# Patient Record
Sex: FEMALE | Race: WHITE | Employment: FULL TIME | URBAN - METROPOLITAN AREA
[De-identification: names, ages, dates, MRNs, and addresses within clinical notes are randomized per-mention and may not be internally consistent; named-entity substitution may affect disease eponyms.]

---

## 2019-12-11 ENCOUNTER — OFFICE VISIT (OUTPATIENT)
Dept: OBGYN | Facility: CLINIC | Age: 24
End: 2019-12-11
Payer: COMMERCIAL

## 2019-12-11 ENCOUNTER — TELEPHONE (OUTPATIENT)
Dept: OBGYN | Facility: CLINIC | Age: 24
End: 2019-12-11

## 2019-12-11 VITALS
BODY MASS INDEX: 37.95 KG/M2 | SYSTOLIC BLOOD PRESSURE: 135 MMHG | DIASTOLIC BLOOD PRESSURE: 87 MMHG | WEIGHT: 201 LBS | HEART RATE: 89 BPM | HEIGHT: 61 IN | TEMPERATURE: 98.4 F

## 2019-12-11 DIAGNOSIS — Z11.3 ROUTINE SCREENING FOR STI (SEXUALLY TRANSMITTED INFECTION): ICD-10-CM

## 2019-12-11 DIAGNOSIS — B96.89 BV (BACTERIAL VAGINOSIS): Primary | ICD-10-CM

## 2019-12-11 DIAGNOSIS — N76.0 BV (BACTERIAL VAGINOSIS): Primary | ICD-10-CM

## 2019-12-11 DIAGNOSIS — N92.6 IRREGULAR MENSES: Primary | ICD-10-CM

## 2019-12-11 DIAGNOSIS — N89.8 VAGINAL DISCHARGE: ICD-10-CM

## 2019-12-11 LAB
HBV SURFACE AG SERPL QL IA: NONREACTIVE
HCG UR QL: NEGATIVE
HCV AB SERPL QL IA: NONREACTIVE
HIV 1+2 AB+HIV1 P24 AG SERPL QL IA: NONREACTIVE
PROLACTIN SERPL-MCNC: 8 UG/L (ref 3–27)
SPECIMEN SOURCE: ABNORMAL
WET PREP SPEC: ABNORMAL

## 2019-12-11 PROCEDURE — 81025 URINE PREGNANCY TEST: CPT | Performed by: OBSTETRICS & GYNECOLOGY

## 2019-12-11 PROCEDURE — 84443 ASSAY THYROID STIM HORMONE: CPT | Performed by: OBSTETRICS & GYNECOLOGY

## 2019-12-11 PROCEDURE — 84270 ASSAY OF SEX HORMONE GLOBUL: CPT | Performed by: OBSTETRICS & GYNECOLOGY

## 2019-12-11 PROCEDURE — 99244 OFF/OP CNSLTJ NEW/EST MOD 40: CPT | Performed by: OBSTETRICS & GYNECOLOGY

## 2019-12-11 PROCEDURE — 87340 HEPATITIS B SURFACE AG IA: CPT | Performed by: OBSTETRICS & GYNECOLOGY

## 2019-12-11 PROCEDURE — 99000 SPECIMEN HANDLING OFFICE-LAB: CPT | Performed by: OBSTETRICS & GYNECOLOGY

## 2019-12-11 PROCEDURE — 87591 N.GONORRHOEAE DNA AMP PROB: CPT | Performed by: OBSTETRICS & GYNECOLOGY

## 2019-12-11 PROCEDURE — 87389 HIV-1 AG W/HIV-1&-2 AB AG IA: CPT | Performed by: OBSTETRICS & GYNECOLOGY

## 2019-12-11 PROCEDURE — 83520 IMMUNOASSAY QUANT NOS NONAB: CPT | Mod: 90 | Performed by: OBSTETRICS & GYNECOLOGY

## 2019-12-11 PROCEDURE — 84403 ASSAY OF TOTAL TESTOSTERONE: CPT | Performed by: OBSTETRICS & GYNECOLOGY

## 2019-12-11 PROCEDURE — 87491 CHLMYD TRACH DNA AMP PROBE: CPT | Performed by: OBSTETRICS & GYNECOLOGY

## 2019-12-11 PROCEDURE — 84146 ASSAY OF PROLACTIN: CPT | Performed by: OBSTETRICS & GYNECOLOGY

## 2019-12-11 PROCEDURE — 86780 TREPONEMA PALLIDUM: CPT | Performed by: OBSTETRICS & GYNECOLOGY

## 2019-12-11 PROCEDURE — 87210 SMEAR WET MOUNT SALINE/INK: CPT | Performed by: OBSTETRICS & GYNECOLOGY

## 2019-12-11 PROCEDURE — 36415 COLL VENOUS BLD VENIPUNCTURE: CPT | Performed by: OBSTETRICS & GYNECOLOGY

## 2019-12-11 PROCEDURE — 86803 HEPATITIS C AB TEST: CPT | Performed by: OBSTETRICS & GYNECOLOGY

## 2019-12-11 RX ORDER — METRONIDAZOLE 500 MG/1
500 TABLET ORAL 2 TIMES DAILY
Qty: 14 TABLET | Refills: 0 | Status: SHIPPED | OUTPATIENT
Start: 2019-12-11 | End: 2019-12-18

## 2019-12-11 RX ORDER — MEDROXYPROGESTERONE ACETATE 10 MG
10 TABLET ORAL DAILY
Qty: 10 TABLET | Refills: 3 | Status: SHIPPED | OUTPATIENT
Start: 2019-12-11 | End: 2019-12-21

## 2019-12-11 SDOH — HEALTH STABILITY: MENTAL HEALTH: HOW OFTEN DO YOU HAVE A DRINK CONTAINING ALCOHOL?: 2-4 TIMES A MONTH

## 2019-12-11 SDOH — HEALTH STABILITY: MENTAL HEALTH: HOW MANY STANDARD DRINKS CONTAINING ALCOHOL DO YOU HAVE ON A TYPICAL DAY?: 1 OR 2

## 2019-12-11 ASSESSMENT — MIFFLIN-ST. JEOR: SCORE: 1604.11

## 2019-12-11 NOTE — LETTER
December 13, 2019      Mally Evans  [address]  [place] [address]        Dear ,    We are writing to inform you of your test results.    Your test results fall within the expected range(s) or remain unchanged from previous results.  Please continue with current treatment plan.    Resulted Orders   HCG Qual, Urine ([id number])   Result Value Ref Range    HCG Qual Urine Negative NEG^Negative      Comment:      This test is for screening purposes.  Results should be interpreted along with   the clinical picture.  Confirmation testing is available if warranted by   ordering [id number], HCG Quantitative Pregnancy.     TSH with free T4 reflex   Result Value Ref Range    TSH 1.26 0.40 - 4.00 mU/L   Prolactin   Result Value Ref Range    Prolactin 8 3 - 27 ug/L      Comment:      Reference ranges apply to non-pregnant females only.   **Testosterone Free and Total FUTURE anytime   Result Value Ref Range    Testosterone Total 38 8 - 60 ng/dL      Comment:      This test was developed and its performance characteristics determined by the   [facility],  Special Chemistry Laboratory. It has   not been cleared or approved by the FDA. The laboratory is regulated under   CLIA as qualified to perform high-complexity testing. This test is used for   clinical purposes. It should not be regarded as investigational or for   research.      Sex Hormone Binding Globulin 38 30 - 135 nmol/L    Free Testosterone Calculated 0.62 0.08 - 0.74 ng/dL      Comment:      18-30 Years 0.08-0.74 ng/dL  31-40 Years 0.13-0.92 ng/dL  41-51 Years 0.11-0.58 ng/dL  Postmenopausal 0.06-0.38 ng/dL     Anti-Mullerian hormone   Result Value Ref Range    Anti-Mullerian Hormone 7.926 0.401 - 16.015 ng/mL      Comment:      (Note)  INTERPRETIVE INFORMATION: Anti-Mullerian Hormone  FEMALE:  6 months - 14 years: 0.256 - 6.345 ng/mL  15-17 years:         0.861 - 10.451 ng/mL  18-29 years:         0.401 - 16.015 ng/mL  30-39  years:         0.176 - 11.705 ng/mL  40-45 years:         6.282 ng/mL or less  46-50 years:         0.064 ng/mL or less  Post-menopausal:     0.003 ng/mL or less  MALE:  6-11 months:         56.677 - 495.299 ng/mL  1-6 years:           33.442 - 342.450 ng/mL  7-9 years:           20.245 - 189.781 ng/mL  10-12 years:         2.903 - 178.243 ng/mL  13 years and older:  2.079 - 30.656 ng/mL  Test developed and characteristics determined by [facility]. See Compliance Statement D: [employer]  Performed by [facility],  [address] [place] [id number] [phone]  [email or web address], Sundar Villafana MD, Lab. Director     NEISSERIA GONORRHOEA PCR   Result Value Ref Range    Specimen Descrip Vagina     N Gonorrhea PCR Negative NEG^Negative      Comment:      Negative for N. gonorrhoeae rRNA by transcription mediated amplification.  A negative result by transcription mediated amplification does not preclude   the presence of N. gonorrhoeae infection because results are dependent on   proper and adequate collection, absence of inhibitors, and sufficient rRNA to   be detected.     CHLAMYDIA TRACHOMATIS PCR   Result Value Ref Range    Specimen Description Vagina     Chlamydia Trachomatis PCR Negative NEG^Negative      Comment:      Negative for C. trachomatis rRNA by transcription mediated amplification.  A negative result by transcription mediated amplification does not preclude   the presence of C. trachomatis infection because results are dependent on   proper and adequate collection, absence of inhibitors, and sufficient rRNA to   be detected.     HIV Antigen Antibody Combo   Result Value Ref Range    HIV Antigen Antibody Combo Nonreactive NR^Nonreactive          Comment:      HIV-1 p24 Ag & HIV-1/HIV-2 Ab Not Detected   Hepatitis C antibody   Result Value Ref Range    Hepatitis C Antibody Nonreactive NR^Nonreactive      Comment:      Assay performance characteristics have not been established for newborns,    infants, and children     Treponema Abs w Reflex to RPR and Titer   Result Value Ref Range    Treponema Antibodies Nonreactive NR^Nonreactive   Hepatitis B surface antigen   Result Value Ref Range    Hep B Surface Agn Nonreactive NR^Nonreactive   Wet prep   Result Value Ref Range    Specimen Description Vagina     Wet Prep Clue cells seen  Few   (A)     Wet Prep No yeast seen     Wet Prep No WBC's seen     Wet Prep No Trichomonas seen        If you have any questions or concerns, please call the clinic at the number listed above.       Sincerely,        Rachna Teran MD

## 2019-12-11 NOTE — PROGRESS NOTES
GYN Clinic Consultation    Date of visit: 2019     Chief Complaint: period late    HPI:   Mally Evans is a 24 year old  female who I was asked to see in consultation as a referral by Slime Paiz for evaluation of oligomenorrhea.     Usually period is very regular, every 28 days.  Was on OCPs, stopped in .  Since stopping the pill, periods still regular. Patient's last menstrual period was 10/31/2019. Sexually active, no contraception. Cramping for past 2 weeks. Feels like she should be having her period but no bleeding. Last unprotected sex was 11/15.     Has increased her exercise about 1 year ago. Works out 3 times per week with a  for 1 hour at a time. Lost about 25lb then gained 15lb back. Body composition has changed. Started tracking her food in October, eating a lot different. Tracks calories and protein. Tries to limit calories to 1900 per day.     Sister has PCOS and mother has hypothyroidism.   Sister's periods are irregular.   Patient has hirsutism - removes dark hair from upper lip. Acne on back.     Obstetric History:   OB History    Para Term  AB Living   2 0 0 0 2 0   SAB TAB Ectopic Multiple Live Births   2 0 0 0 0      # Outcome Date GA Lbr Kishan/2nd Weight Sex Delivery Anes PTL Lv   2 SAB 2019 5w0d    SAB      1 SAB 2017 5w0d    SAB          Gynecologic History:  Menarche: 13  Menses: occur every 28 days lasting 3 - 5 days in duration.   Patient's last menstrual period was 10/31/2019.  STI history: none  Last Pap: 18 NIL   History of abnormal pap: no  History of cervical procedures: no   Contraceptive History: none  The patient is sexually active with male partners.     Past Medical History:  History reviewed. No pertinent past medical history.    Past Surgical History:  Past Surgical History:   Procedure Laterality Date     ENDOSCOPIC RETROGRADE CHOLANGIOPANCREATOGRAPHY  2017     LAPAROSCOPIC CHOLECYSTECTOMY  2017     wisdom teeth  "removal  03/2018         Medications:  No current outpatient medications on file.     No current facility-administered medications for this visit.        Allergy:  No Known Allergies  Patient denies food, latex or environmental allergies.     Social History:  Works as an  at a dental office.   Lives alone.  Social History     Tobacco Use     Smoking status: Light Tobacco Smoker     Smokeless tobacco: Current User     Tobacco comment: pt states she smokes occ.   Substance Use Topics     Alcohol use: Yes     Frequency: 2-4 times a month     Drinks per session: 1 or 2     Drug use: Yes     Types: Marijuana         Family History   Problem Relation Age of Onset     Anxiety Disorder Mother      Depression Mother      Thyroid Disease Mother      Depression Father      Anxiety Disorder Father      Breast Cancer Maternal Grandmother      Diabetes Paternal Grandfather      Dementia Paternal Grandfather      Polycystic ovary syndrome Sister        Review of Systems:  Irregular menses, hirsutism and acne   ROS: 10 point ROS neg other than the symptoms noted above      Physical Exam:  Vitals:    12/11/19 1107   BP: 135/87   Pulse: 89   Temp: 98.4  F (36.9  C)   TempSrc: Oral   Weight: 91.2 kg (201 lb)   Height: 1.549 m (5' 1\")     Body mass index is 37.98 kg/m .    Gen: healthy, alert, active, no distress  Neck: supple, no adenopathy, normal thyroid  CV: normal pulses, no edema  Resp: normal respiratory effort  Abd: soft, non-tender, non-distended, no masses, no hernias, well healed scars  Extremities: nontender, warm and well perfused  :   - external genitalia: vulva and perineum are normal without lesion, mass or erythema  - urethra: well supported urethra, no hypermobility, normal Skenes and Bartholins.   - bladder: no tenderness, no masses  - vagina: intact, rugated mucosa without lesions or abnormal discharge. noprolapse.   - cervix: normal, no lesions or abnormal discharge. Wet prep, GC/CT cultures are " obtained.   - uterus: 6wk size, no masses or tenderness  - adnexa: no masses or tenderness  - rectal: deferred      Assessment:  Mally Evans is a 24 year old  who presents to discuss irregular menses. Oligomenorrhea, hirsutism, acne, obesity and family history (sister) with PCOS - discussed she likely has it as well. Discussed that if she is sexually active and not using contraception even though her periods are irregular she can still get pregnant. She is not interested in contraception at this time. Discussed provera to induce withdrawal bleed - she would like to do this. Discussed polycystic ovarian syndrome with Mally Evans. Explained that polycystic ovary syndrome (PCOS) is an important cause of both menstrual irregularity and androgen excess in women. Discussed that it can cause irregular menstrual cycles, hirsutism, obesity, insulin resistance, and anovulatory infertility. Specifically explained that due to PCOS she is at increased risk of type 2 diabetes, cardiovascular disease, endometrial hyperplasia, endometrial cancer and infertility.   Counseled Mally Evans that diet and exercise for weight reduction as the first step for overweight and obese women with PCOS. Weight loss can restore ovulatory cycles and improve metabolic risk. Plan for weight loss: working with  and tracking food.  Discussed that oral contraceptives (OCs) are the mainstay of pharmacologic therapy for women with PCOS for managing hyperandrogenism, treating menstrual dysfunction and protecting endometrial lining. Additionally it provides contraception.   For women with PCOS who choose not to or cannot take OCs, we discussed alternative treatments for endometrial protection are intermittent or continuous progestin therapy, or a progestin-releasing intrauterine device (IUD)   Additionally discussed metformin as an alternative that can restore ovulatory menses in approximately 50 percent of women with PCOS.   Discussed that if  she is unable to conceive on her own, she benefit from medications to induce ovulation.    Plan:  1. Irregular menses  - HCG Qual, Urine ([id number])  - TSH with free T4 reflex  - Prolactin  - **Testosterone Free and Total FUTURE anytime  - Anti-Mullerian hormone  - medroxyPROGESTERone (PROVERA) 10 MG tablet; Take 1 tablet (10 mg) by mouth daily for 10 days  Dispense: 10 tablet; Refill: 3    2. Routine screening for STI (sexually transmitted infection)  - NEISSERIA GONORRHOEA PCR  - CHLAMYDIA TRACHOMATIS PCR  - HIV Antigen Antibody Combo  - Hepatitis C antibody  - Treponema Abs w Reflex to RPR and Titer  - Hepatitis B surface antigen  - Wet prep    3. Vaginal discharge  - Wet prep      Follow up: CHEIKH Teran MD

## 2019-12-11 NOTE — LETTER
December 12, 2019      Mally Evans  [address]  [place] [address]        Dear ,    We are writing to inform you of your test results.    Your test results fall within the expected range(s) or remain unchanged from previous results.  Please continue with current treatment plan.    Resulted Orders   HCG Qual, Urine ([id number])   Result Value Ref Range    HCG Qual Urine Negative NEG^Negative      Comment:      This test is for screening purposes.  Results should be interpreted along with   the clinical picture.  Confirmation testing is available if warranted by   ordering [id number], HCG Quantitative Pregnancy.     TSH with free T4 reflex   Result Value Ref Range    TSH 1.26 0.40 - 4.00 mU/L   Prolactin   Result Value Ref Range    Prolactin 8 3 - 27 ug/L      Comment:      Reference ranges apply to non-pregnant females only.   HIV Antigen Antibody Combo   Result Value Ref Range    HIV Antigen Antibody Combo Nonreactive NR^Nonreactive          Comment:      HIV-1 p24 Ag & HIV-1/HIV-2 Ab Not Detected   Hepatitis C antibody   Result Value Ref Range    Hepatitis C Antibody Nonreactive NR^Nonreactive      Comment:      Assay performance characteristics have not been established for newborns,   infants, and children     Hepatitis B surface antigen   Result Value Ref Range    Hep B Surface Agn Nonreactive NR^Nonreactive   Wet prep   Result Value Ref Range    Specimen Description Vagina     Wet Prep Clue cells seen  Few   (A)     Wet Prep No yeast seen     Wet Prep No WBC's seen     Wet Prep No Trichomonas seen        If you have any questions or concerns, please call the clinic at the number listed above.       Sincerely,        Rachna Teran MD

## 2019-12-11 NOTE — LETTER
December 12, 2019      Mally Evans  [address]  [place] [address]        Dear ,    We are writing to inform you of your test results.    Your test results fall within the expected range(s) or remain unchanged from previous results.  Please continue with current treatment plan.    Resulted Orders   HCG Qual, Urine ([id number])   Result Value Ref Range    HCG Qual Urine Negative NEG^Negative      Comment:      This test is for screening purposes.  Results should be interpreted along with   the clinical picture.  Confirmation testing is available if warranted by   ordering [id number], HCG Quantitative Pregnancy.     TSH with free T4 reflex   Result Value Ref Range    TSH 1.26 0.40 - 4.00 mU/L   Prolactin   Result Value Ref Range    Prolactin 8 3 - 27 ug/L      Comment:      Reference ranges apply to non-pregnant females only.   NEISSERIA GONORRHOEA PCR   Result Value Ref Range    Specimen Descrip Vagina     N Gonorrhea PCR Negative NEG^Negative      Comment:      Negative for N. gonorrhoeae rRNA by transcription mediated amplification.  A negative result by transcription mediated amplification does not preclude   the presence of N. gonorrhoeae infection because results are dependent on   proper and adequate collection, absence of inhibitors, and sufficient rRNA to   be detected.     CHLAMYDIA TRACHOMATIS PCR   Result Value Ref Range    Specimen Description Vagina     Chlamydia Trachomatis PCR Negative NEG^Negative      Comment:      Negative for C. trachomatis rRNA by transcription mediated amplification.  A negative result by transcription mediated amplification does not preclude   the presence of C. trachomatis infection because results are dependent on   proper and adequate collection, absence of inhibitors, and sufficient rRNA to   be detected.     HIV Antigen Antibody Combo   Result Value Ref Range    HIV Antigen Antibody Combo Nonreactive NR^Nonreactive          Comment:      HIV-1 p24 Ag &  HIV-1/HIV-2 Ab Not Detected   Hepatitis C antibody   Result Value Ref Range    Hepatitis C Antibody Nonreactive NR^Nonreactive      Comment:      Assay performance characteristics have not been established for newborns,   infants, and children     Treponema Abs w Reflex to RPR and Titer   Result Value Ref Range    Treponema Antibodies Nonreactive NR^Nonreactive   Hepatitis B surface antigen   Result Value Ref Range    Hep B Surface Agn Nonreactive NR^Nonreactive   Wet prep   Result Value Ref Range    Specimen Description Vagina     Wet Prep Clue cells seen  Few   (A)     Wet Prep No yeast seen     Wet Prep No WBC's seen     Wet Prep No Trichomonas seen        If you have any questions or concerns, please call the clinic at the number listed above.       Sincerely,        Rachna Teran MD

## 2019-12-12 LAB
C TRACH DNA SPEC QL NAA+PROBE: NEGATIVE
MIS SERPL-MCNC: 7.93 NG/ML (ref 0.4–16.02)
N GONORRHOEA DNA SPEC QL NAA+PROBE: NEGATIVE
SPECIMEN SOURCE: NORMAL
SPECIMEN SOURCE: NORMAL
T PALLIDUM AB SER QL: NONREACTIVE
TSH SERPL DL<=0.005 MIU/L-ACNC: 1.26 MU/L (ref 0.4–4)

## 2019-12-13 LAB
SHBG SERPL-SCNC: 38 NMOL/L (ref 30–135)
TESTOST FREE SERPL-MCNC: 0.62 NG/DL (ref 0.08–0.74)
TESTOST SERPL-MCNC: 38 NG/DL (ref 8–60)

## 2020-03-11 ENCOUNTER — HEALTH MAINTENANCE LETTER (OUTPATIENT)
Age: 25
End: 2020-03-11

## 2021-01-04 ENCOUNTER — HEALTH MAINTENANCE LETTER (OUTPATIENT)
Age: 26
End: 2021-01-04

## 2021-03-18 ENCOUNTER — IMMUNIZATION (OUTPATIENT)
Dept: NURSING | Facility: CLINIC | Age: 26
End: 2021-03-18
Payer: COMMERCIAL

## 2021-03-18 PROCEDURE — 0001A PR COVID VAC PFIZER DIL RECON 30 MCG/0.3 ML IM: CPT

## 2021-03-18 PROCEDURE — 91300 PR COVID VAC PFIZER DIL RECON 30 MCG/0.3 ML IM: CPT

## 2021-04-08 ENCOUNTER — IMMUNIZATION (OUTPATIENT)
Dept: NURSING | Facility: CLINIC | Age: 26
End: 2021-04-08
Attending: FAMILY MEDICINE
Payer: COMMERCIAL

## 2021-04-08 PROCEDURE — 0002A PR COVID VAC PFIZER DIL RECON 30 MCG/0.3 ML IM: CPT

## 2021-04-08 PROCEDURE — 91300 PR COVID VAC PFIZER DIL RECON 30 MCG/0.3 ML IM: CPT

## 2021-04-25 ENCOUNTER — HEALTH MAINTENANCE LETTER (OUTPATIENT)
Age: 26
End: 2021-04-25

## 2021-10-10 ENCOUNTER — HEALTH MAINTENANCE LETTER (OUTPATIENT)
Age: 26
End: 2021-10-10

## 2021-12-29 ENCOUNTER — MEDICAL CORRESPONDENCE (OUTPATIENT)
Dept: HEALTH INFORMATION MANAGEMENT | Facility: CLINIC | Age: 26
End: 2021-12-29

## 2021-12-29 ENCOUNTER — OFFICE VISIT (OUTPATIENT)
Dept: FAMILY MEDICINE | Facility: CLINIC | Age: 26
End: 2021-12-29
Payer: COMMERCIAL

## 2021-12-29 VITALS
BODY MASS INDEX: 39.46 KG/M2 | SYSTOLIC BLOOD PRESSURE: 110 MMHG | HEIGHT: 61 IN | DIASTOLIC BLOOD PRESSURE: 77 MMHG | HEART RATE: 80 BPM | TEMPERATURE: 98.5 F | WEIGHT: 209 LBS

## 2021-12-29 DIAGNOSIS — N92.6 MISSED MENSES: ICD-10-CM

## 2021-12-29 DIAGNOSIS — Z91.410 HISTORY OF DOMESTIC PHYSICAL ABUSE IN ADULT: ICD-10-CM

## 2021-12-29 DIAGNOSIS — F33.2 SEVERE EPISODE OF RECURRENT MAJOR DEPRESSIVE DISORDER, WITHOUT PSYCHOTIC FEATURES (H): Primary | ICD-10-CM

## 2021-12-29 DIAGNOSIS — F43.10 PTSD (POST-TRAUMATIC STRESS DISORDER): ICD-10-CM

## 2021-12-29 DIAGNOSIS — Z11.3 SCREEN FOR STD (SEXUALLY TRANSMITTED DISEASE): ICD-10-CM

## 2021-12-29 LAB
HCG UR QL: NEGATIVE
HIV 1+2 AB+HIV1 P24 AG SERPL QL IA: NEGATIVE

## 2021-12-29 PROCEDURE — 87340 HEPATITIS B SURFACE AG IA: CPT | Performed by: FAMILY MEDICINE

## 2021-12-29 PROCEDURE — 86803 HEPATITIS C AB TEST: CPT | Performed by: FAMILY MEDICINE

## 2021-12-29 PROCEDURE — 86780 TREPONEMA PALLIDUM: CPT | Performed by: FAMILY MEDICINE

## 2021-12-29 PROCEDURE — 87389 HIV-1 AG W/HIV-1&-2 AB AG IA: CPT | Performed by: FAMILY MEDICINE

## 2021-12-29 PROCEDURE — 81025 URINE PREGNANCY TEST: CPT | Performed by: FAMILY MEDICINE

## 2021-12-29 PROCEDURE — 99204 OFFICE O/P NEW MOD 45 MIN: CPT | Performed by: FAMILY MEDICINE

## 2021-12-29 PROCEDURE — 36415 COLL VENOUS BLD VENIPUNCTURE: CPT | Performed by: FAMILY MEDICINE

## 2021-12-29 RX ORDER — SERTRALINE HYDROCHLORIDE 25 MG/1
25 TABLET, FILM COATED ORAL DAILY
Qty: 30 TABLET | Refills: 1 | Status: SHIPPED | OUTPATIENT
Start: 2021-12-29 | End: 2023-01-02

## 2021-12-29 RX ORDER — HYDROXYZINE HYDROCHLORIDE 25 MG/1
25-50 TABLET, FILM COATED ORAL
Qty: 30 TABLET | Refills: 1 | Status: SHIPPED | OUTPATIENT
Start: 2021-12-29 | End: 2023-01-02

## 2021-12-29 ASSESSMENT — ANXIETY QUESTIONNAIRES
5. BEING SO RESTLESS THAT IT IS HARD TO SIT STILL: NEARLY EVERY DAY
GAD7 TOTAL SCORE: 18
3. WORRYING TOO MUCH ABOUT DIFFERENT THINGS: NEARLY EVERY DAY
1. FEELING NERVOUS, ANXIOUS, OR ON EDGE: MORE THAN HALF THE DAYS
4. TROUBLE RELAXING: NEARLY EVERY DAY
7. FEELING AFRAID AS IF SOMETHING AWFUL MIGHT HAPPEN: NEARLY EVERY DAY
IF YOU CHECKED OFF ANY PROBLEMS ON THIS QUESTIONNAIRE, HOW DIFFICULT HAVE THESE PROBLEMS MADE IT FOR YOU TO DO YOUR WORK, TAKE CARE OF THINGS AT HOME, OR GET ALONG WITH OTHER PEOPLE: VERY DIFFICULT
6. BECOMING EASILY ANNOYED OR IRRITABLE: MORE THAN HALF THE DAYS
2. NOT BEING ABLE TO STOP OR CONTROL WORRYING: MORE THAN HALF THE DAYS

## 2021-12-29 ASSESSMENT — MIFFLIN-ST. JEOR: SCORE: 1630.4

## 2021-12-29 ASSESSMENT — PATIENT HEALTH QUESTIONNAIRE - PHQ9: SUM OF ALL RESPONSES TO PHQ QUESTIONS 1-9: 20

## 2021-12-29 NOTE — PATIENT INSTRUCTIONS
Mental Health referral recommendations:    [facility]:   [facility] [phone]  [address], [place] [address]     [place]:  [place] [facility]: [phone]  [address], [facility] [address]    [facility]: [phone]  [address], [place] [address]    [facility]:  [phone]   [address] [place] [address]    [facility]:  [facility] [phone]   [address], [address]    [place]:  [facility]: [phone]  [address]     [place]:  [place]  [phone]  [address], [place] [address]    [place]:  [place] [facility] [phone]    [address], [facility]  [address]    [facility] /[phone]   [address], [address]    [facility] [phone] [address], [address]    [facility] [phone]   [facility]    Below are resources in case you experience an increase in symptoms or feel you are in crisis:     Acute Emergency / Crisis    If you are having an acute psychiatric emergency, please call 911 or have someone drive you directly to a hospital for further evaluation.    Mental Health Crisis Lines    [facility]:     Adult Crisis Line ([phone])    Children's Crisis Line ([phone])  [facility]:  Crisis Connection  ([phone])      Urgent Care     [address] [place] [address]   ([phone])     Provides mental health crisis assessments    Walk-in appointments are available     Additional resources  -Local 24 hour Crisis Line: 1-734.582.7086   -National Suicide Prevention Life Line 0-572-871-TALK (3479), www.suicidepreventionlifeline.org  - National [place] of Mental Illness (www.[place].richard.org) 616.655.4721 or 1-224.489.4771  - Suicide Awareness Voices of Education (SAVE) (www.save.org) 4-796-638-SAVE (4719)   - Substance Abuse and  Mental Health Services Administration ([facility]) www.samhsa.gov 24 hour helpline: 1-141-708 HELP (6441)  - Narcotics Anonymous (www.[place].org) 24 hours [facility] Helpline 1-363.756.3466  - [facility] Alcoholic Anonymous [facility] 24 hour phone line [phone]  - Alcoholics Anonymous (www.alcoholics-anonymous.org)  - [place] Recovery Connection ([facility]). [facility] connects people seeking recovery to resources that help foster and sustain long-term recovery. Whether you are seeking resources for treatment, transpiortation, housing, job training, education, health or other pathways to recovery, [facility] is a great place to start. [phone] www.[place] recovery.org    Health Tips:  - Create a daily schedule  - Eat Healthy  - Do at least one enjoyable activity each day  - Take medications as prescribed  - Get regular sleep at least 8 hours per night  - Practice relaxation  - Spend time with supportive people
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
## 2021-12-30 PROBLEM — K80.70 CHOLELITHIASIS WITH CHOLEDOCHOLITHIASIS: Status: ACTIVE | Noted: 2017-03-13

## 2021-12-30 LAB
HBV SURFACE AG SERPL QL IA: NONREACTIVE
HCV AB SERPL QL IA: NEGATIVE
T PALLIDUM AB SER QL: NONREACTIVE

## 2021-12-30 ASSESSMENT — ANXIETY QUESTIONNAIRES: GAD7 TOTAL SCORE: 18

## 2022-04-05 ENCOUNTER — OFFICE VISIT (OUTPATIENT)
Dept: OBGYN | Facility: CLINIC | Age: 27
End: 2022-04-05
Payer: COMMERCIAL

## 2022-04-05 VITALS — WEIGHT: 194.5 LBS | HEIGHT: 62 IN | BODY MASS INDEX: 35.79 KG/M2

## 2022-04-05 DIAGNOSIS — Z01.411 ENCOUNTER FOR GYNECOLOGICAL EXAMINATION WITH ABNORMAL FINDING: Primary | ICD-10-CM

## 2022-04-05 DIAGNOSIS — E66.01 MORBID OBESITY (H): ICD-10-CM

## 2022-04-05 LAB
CLUE CELLS: ABNORMAL
PROLACTIN SERPL-MCNC: 7 UG/L (ref 3–27)
TRICHOMONAS, WET PREP: ABNORMAL
WBC'S/HIGH POWER FIELD, WET PREP: ABNORMAL
YEAST, WET PREP: ABNORMAL

## 2022-04-05 PROCEDURE — 85730 THROMBOPLASTIN TIME PARTIAL: CPT | Performed by: OBSTETRICS & GYNECOLOGY

## 2022-04-05 PROCEDURE — 87210 SMEAR WET MOUNT SALINE/INK: CPT | Performed by: OBSTETRICS & GYNECOLOGY

## 2022-04-05 PROCEDURE — 87340 HEPATITIS B SURFACE AG IA: CPT | Performed by: OBSTETRICS & GYNECOLOGY

## 2022-04-05 PROCEDURE — 86803 HEPATITIS C AB TEST: CPT | Performed by: OBSTETRICS & GYNECOLOGY

## 2022-04-05 PROCEDURE — 36415 COLL VENOUS BLD VENIPUNCTURE: CPT | Performed by: OBSTETRICS & GYNECOLOGY

## 2022-04-05 PROCEDURE — 84146 ASSAY OF PROLACTIN: CPT | Performed by: OBSTETRICS & GYNECOLOGY

## 2022-04-05 PROCEDURE — 86780 TREPONEMA PALLIDUM: CPT | Performed by: OBSTETRICS & GYNECOLOGY

## 2022-04-05 PROCEDURE — 85390 FIBRINOLYSINS SCREEN I&R: CPT | Performed by: PATHOLOGY

## 2022-04-05 PROCEDURE — 84443 ASSAY THYROID STIM HORMONE: CPT | Performed by: OBSTETRICS & GYNECOLOGY

## 2022-04-05 PROCEDURE — 87491 CHLMYD TRACH DNA AMP PROBE: CPT | Performed by: OBSTETRICS & GYNECOLOGY

## 2022-04-05 PROCEDURE — 87591 N.GONORRHOEAE DNA AMP PROB: CPT | Performed by: OBSTETRICS & GYNECOLOGY

## 2022-04-05 PROCEDURE — 99395 PREV VISIT EST AGE 18-39: CPT | Performed by: OBSTETRICS & GYNECOLOGY

## 2022-04-05 PROCEDURE — 85613 RUSSELL VIPER VENOM DILUTED: CPT | Performed by: OBSTETRICS & GYNECOLOGY

## 2022-04-05 PROCEDURE — 99213 OFFICE O/P EST LOW 20 MIN: CPT | Mod: 25 | Performed by: OBSTETRICS & GYNECOLOGY

## 2022-04-05 PROCEDURE — G0145 SCR C/V CYTO,THINLAYER,RESCR: HCPCS | Performed by: OBSTETRICS & GYNECOLOGY

## 2022-04-05 PROCEDURE — 87389 HIV-1 AG W/HIV-1&-2 AB AG IA: CPT | Performed by: OBSTETRICS & GYNECOLOGY

## 2022-04-05 NOTE — PROGRESS NOTES
SUBJECTIVE:                                                      Mally is a 26 year old  female who presents for annual exam.     Her last gyn exam was with Dr. Teran in 2019.  PCOS was discussed and labs were drawn.  No hyperandrogenism was present.  Her last Pap was in 2018 at [facility] and was Negative.      No LMP recorded.. Menses are irregular and variable lasting.  Cycle lengths vary between 23 and 32 days.  Using condoms for contraception.  She is not currently considering pregnancy but will in the near future.  Thinks she has more than the 2 documented 1st trimester sabs.  Besides routine health maintenance,  she would like to discuss vaginal itching, painful I/C, irregular cycles and recurrent miscarriages.  GYNECOLOGIC HISTORY:    Mally is sexually active with 1 male partner and is currently in a monogamous relationship.      History sexually transmitted infections:No STD history    History of abnormal Pap smear: NO - age 21-29 PAP every 3 years recommended.  Last 2018 at [facility]  Family history of breast CA: Yes - MGM  Family history of uterine/ovarian CA: No    Family history of colon CA: No      HISTORY:  OB History    Para Term  AB Living   2 0 0 0 2 0   SAB IAB Ectopic Multiple Live Births   2 0 0 0 0      # Outcome Date GA Lbr Kishan/2nd Weight Sex Delivery Anes PTL Lv   2 SAB 2019 5w0d    SAB      1 SAB 2017 5w0d    SAB        Past Medical History:   Diagnosis Date     Known health problems: none 2022     Past Surgical History:   Procedure Laterality Date     ENDOSCOPIC RETROGRADE CHOLANGIOPANCREATOGRAPHY       LAPAROSCOPIC CHOLECYSTECTOMY  2017     wisdom teeth removal  2018     Family History   Problem Relation Age of Onset     Anxiety Disorder Mother      Depression Mother      Thyroid Disease Mother      Depression Father      Anxiety Disorder Father      Polycystic ovary syndrome Sister      Depression Sister      Depression Brother       Breast Cancer Maternal Grandmother      Diabetes Paternal Grandfather      Dementia Paternal Grandfather      Social History     Socioeconomic History     Marital status: Single     Spouse name: None     Number of children: None     Years of education: None     Highest education level: None   Occupational History     None   Tobacco Use     Smoking status: Former Smoker     Quit date: 10/2020     Years since quittin.5     Smokeless tobacco: Current User     Tobacco comment: vape   Substance and Sexual Activity     Alcohol use: Yes     Comment: seldom     Drug use: Yes     Types: Marijuana     Sexual activity: Yes     Partners: Male     Birth control/protection: Condom   Other Topics Concern     Parent/sibling w/ CABG, MI or angioplasty before 65F 55M? Not Asked   Social History Narrative     None     Social Determinants of Health     Financial Resource Strain: Not on file   Food Insecurity: Not on file   Transportation Needs: Not on file   Physical Activity: Not on file   Stress: Not on file   Social Connections: Not on file   Intimate Partner Violence: Not on file   Housing Stability: Not on file       Current Outpatient Medications:      hydrOXYzine (ATARAX) 25 MG tablet, Take 1-2 tablets (25-50 mg) by mouth nightly as needed (sleep), Disp: 30 tablet, Rfl: 1     sertraline (ZOLOFT) 25 MG tablet, Take 1 tablet (25 mg) by mouth daily, Disp: 30 tablet, Rfl: 1     Allergies   Allergen Reactions     Latex Rash     Adhesive Tape Rash     rash       Past medical, surgical, social and family history were reviewed and updated in EPIC.    ROS:   12 point review of systems negative other than symptoms noted below.      OBJECTIVE:                                                      EXAM:  There were no vitals taken for this visit.   BMI: There is no height or weight on file to calculate BMI.  General: Alert and oriented, no distress.  Psychiatric: Mood and affect within normal limits.  Skin: Warm and dry, no lesions,  rashes or discolorations.  Neck: Neck supple. Thyroid palpbably normal in size and without nodularity.  Cardiovascular: Regular rate and rhythm, no murmurs, rubs or gallops.   Lungs:  Clear to auscultation bilaterally, breathing is unlabored.  Breasts:  Symmetric, no skin changes.  No dominant masses bilaterally.   Lymph:  No cervical, supraclavicular, infraclavicular, axillary or inguinal lymphadenopathy palpable.   Abdomen: Soft, nontender, no hepatosplenomegaly, no rebound or guarding, no masses, no hernias.   Vulva:  No external lesions, normal female hair distribution, no inguinal adenopathy.    Urethra:  Midline, non-tender, well supported, no discharge  Vagina:  Well-estrogenized, thick white clumpy discharge, no lesions  Cervix: no lesions, no discharge, nulliparous and Pap and GC/Chlamydia PCR obtained  Uterus:  anteverted, smooth contour, without enlargement, mobile, and without tenderness and tender over fundus  Ovaries:  No masses appreciated, non-tender, mobile  Rectal Exam: deferred  Musculoskeletal: extremities normal      COUNSELING:   Reviewed preventive health counseling, as reflected in patient instructions       Contraception       Safe sex practices/STD prevention       Discussed diagnosis and w/u for recurrent sabs   reports that she quit smoking about 18 months ago. She uses smokeless tobacco.        ASSESSMENT/PLAN:                                                      26 year old female with satisfactory annual exam  (Z01.419) Encounter for routine gynecological examination  (primary encounter diagnosis)  Comment: Normal exam save for increased discharge  Plan: Pap screen reflex to HPV if ASCUS - recommend         age 25 - 29, Wet prep - Clinic Collect,         NEISSERIA GONORRHOEA PCR, CHLAMYDIA TRACHOMATIS        PCR, TSH with free T4 reflex, Prolactin, HIV         Antigen Antibody Combo [[id number]], Hepatitis B         surface antigen [[id number]], Hepatitis C antibody         [[id number]],  Treponema Abs w Reflex to RPR and         Titer [[id number]], Lupus Anticoagulant Panel, US         Pelvic Transabdominal and Transvaginal          Loosely meets criteria for recurrent abortions.  2 documented 1st trimester sabs but thinks she's had more.  Will check lupus anticoagulant and schedule and U/S as initial w/u    STD screening ordered as well      Jesus Campbell MD

## 2022-04-06 ENCOUNTER — ALLIED HEALTH/NURSE VISIT (OUTPATIENT)
Dept: NURSING | Facility: CLINIC | Age: 27
End: 2022-04-06
Payer: COMMERCIAL

## 2022-04-06 ENCOUNTER — TELEPHONE (OUTPATIENT)
Dept: OBGYN | Facility: CLINIC | Age: 27
End: 2022-04-06

## 2022-04-06 ENCOUNTER — ANCILLARY PROCEDURE (OUTPATIENT)
Dept: ULTRASOUND IMAGING | Facility: CLINIC | Age: 27
End: 2022-04-06
Attending: OBSTETRICS & GYNECOLOGY
Payer: COMMERCIAL

## 2022-04-06 DIAGNOSIS — A54.9 GONORRHEA: Primary | ICD-10-CM

## 2022-04-06 DIAGNOSIS — Z01.411 ENCOUNTER FOR GYNECOLOGICAL EXAMINATION WITH ABNORMAL FINDING: ICD-10-CM

## 2022-04-06 LAB
C TRACH DNA SPEC QL NAA+PROBE: NEGATIVE
DRVVT SCREEN RATIO: 1.02
HBV SURFACE AG SERPL QL IA: NONREACTIVE
HCV AB SERPL QL IA: NONREACTIVE
HIV 1+2 AB+HIV1 P24 AG SERPL QL IA: NONREACTIVE
INR PPP: 1.05 (ref 0.85–1.15)
LA PPP-IMP: NEGATIVE
LUPUS INTERPRETATION: NORMAL
N GONORRHOEA DNA SPEC QL NAA+PROBE: POSITIVE
PTT RATIO: 1.05
T PALLIDUM AB SER QL: NONREACTIVE
THROMBIN TIME: 16 SECONDS (ref 13–19)
TSH SERPL DL<=0.005 MIU/L-ACNC: 0.54 MU/L (ref 0.4–4)

## 2022-04-06 PROCEDURE — 96372 THER/PROPH/DIAG INJ SC/IM: CPT | Performed by: OBSTETRICS & GYNECOLOGY

## 2022-04-06 PROCEDURE — 99207 PR NO CHARGE NURSE ONLY: CPT

## 2022-04-06 PROCEDURE — 76830 TRANSVAGINAL US NON-OB: CPT | Performed by: OBSTETRICS & GYNECOLOGY

## 2022-04-06 PROCEDURE — 76856 US EXAM PELVIC COMPLETE: CPT | Performed by: OBSTETRICS & GYNECOLOGY

## 2022-04-06 NOTE — TELEPHONE ENCOUNTER
Dr. Campbell called Mally re: her test results.  Will CI this afternoon for Rocephin injection for pos Gonorrhea result.   [facility] form started and on my desk to be faxed following injection.   Angi Torrez CMA

## 2022-04-06 NOTE — NURSING NOTE
Clinic Administered Medication Documentation    Administrations This Visit     cefTRIAXone (ROCEPHIN) injection 500 mg     Admin Date  04/06/2022 Action  Given Dose  500 mg Route  Intramuscular Site   Administered By  Angi Torrez CMA    Ordering Provider: Gavino Campbell MD    Patient Supplied?: No    Comments: on schedule                  Injectable Medication Documentation    Patient was given Ceftriaxone Sodium (Rocephin). Prior to medication administration, verified patients identity using patient s name and date of birth. Please see MAR and medication order for additional information. Patient instructed to remain in clinic for 15 minutes.      Was entire vial of medication used? Yes  Vial/Syringe: Single dose vial  Expiration Date:  02/2024  Was this medication supplied by the patient? No   Angi Torrez CMA

## 2022-04-07 LAB
BKR LAB AP GYN ADEQUACY: NORMAL
BKR LAB AP GYN INTERPRETATION: NORMAL
BKR LAB AP HPV REFLEX: NORMAL
BKR LAB AP PREVIOUS ABNORMAL: NORMAL
PATH REPORT.COMMENTS IMP SPEC: NORMAL
PATH REPORT.COMMENTS IMP SPEC: NORMAL
PATH REPORT.RELEVANT HX SPEC: NORMAL

## 2022-09-18 ENCOUNTER — HEALTH MAINTENANCE LETTER (OUTPATIENT)
Age: 27
End: 2022-09-18

## 2022-10-10 ENCOUNTER — OFFICE VISIT (OUTPATIENT)
Dept: FAMILY MEDICINE | Facility: CLINIC | Age: 27
End: 2022-10-10
Payer: COMMERCIAL

## 2022-10-10 VITALS
WEIGHT: 186.6 LBS | DIASTOLIC BLOOD PRESSURE: 74 MMHG | TEMPERATURE: 98.6 F | OXYGEN SATURATION: 98 % | RESPIRATION RATE: 16 BRPM | BODY MASS INDEX: 34.41 KG/M2 | HEART RATE: 80 BPM | SYSTOLIC BLOOD PRESSURE: 103 MMHG

## 2022-10-10 DIAGNOSIS — S13.9XXA NECK SPRAIN, INITIAL ENCOUNTER: Primary | ICD-10-CM

## 2022-10-10 DIAGNOSIS — G44.209 TENSION HEADACHE: ICD-10-CM

## 2022-10-10 DIAGNOSIS — Z59.71 UNINSURED: ICD-10-CM

## 2022-10-10 PROCEDURE — 99214 OFFICE O/P EST MOD 30 MIN: CPT | Performed by: NURSE PRACTITIONER

## 2022-10-10 RX ORDER — CYCLOBENZAPRINE HCL 5 MG
5 TABLET ORAL 3 TIMES DAILY PRN
Qty: 20 TABLET | Refills: 0 | Status: SHIPPED | OUTPATIENT
Start: 2022-10-10 | End: 2022-12-22

## 2022-10-10 ASSESSMENT — ENCOUNTER SYMPTOMS
LIGHT-HEADEDNESS: 1
NUMBNESS: 0
NAUSEA: 1
EYE REDNESS: 0
HEADACHES: 1
WEAKNESS: 0
APPETITE CHANGE: 1
PARESTHESIAS: 0
EYE PAIN: 1

## 2022-10-11 ENCOUNTER — TELEPHONE (OUTPATIENT)
Dept: FAMILY MEDICINE | Facility: CLINIC | Age: 27
End: 2022-10-11

## 2022-10-11 NOTE — TELEPHONE ENCOUNTER
Called patient to let her know that her photo ID is at the  at [facility].  Pt states she will come by to pick it up.  The ID is an envelope in the patient pick-up file under pt last name.
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
## 2022-10-11 NOTE — PATIENT INSTRUCTIONS
Lidocaine patch - over the counter.  Salonpas.     Ibuprofen 600 mg 4 times daily (Excedrin Tension headache has Tylenol in it)     Ice your neck 20 minutes up to 1 x per hour (at least 4-6x per day for the next 2 days).

## 2022-12-22 ENCOUNTER — PRENATAL OFFICE VISIT (OUTPATIENT)
Dept: NURSING | Facility: CLINIC | Age: 27
End: 2022-12-22

## 2022-12-22 ENCOUNTER — TRANSCRIBE ORDERS (OUTPATIENT)
Dept: MATERNAL FETAL MEDICINE | Facility: CLINIC | Age: 27
End: 2022-12-22

## 2022-12-22 VITALS — HEIGHT: 62 IN | BODY MASS INDEX: 35.88 KG/M2 | WEIGHT: 195 LBS

## 2022-12-22 DIAGNOSIS — Z80.8 FAMILY HISTORY OF THYROID CANCER: ICD-10-CM

## 2022-12-22 DIAGNOSIS — Z34.90 ENCOUNTER FOR SUPERVISION OF NORMAL PREGNANCY: Primary | ICD-10-CM

## 2022-12-22 DIAGNOSIS — O26.90 PREGNANCY RELATED CONDITION, ANTEPARTUM: Primary | ICD-10-CM

## 2022-12-22 DIAGNOSIS — Z23 NEED FOR TDAP VACCINATION: ICD-10-CM

## 2022-12-22 PROCEDURE — 99207 PR NO CHARGE NURSE ONLY: CPT

## 2022-12-22 NOTE — PROGRESS NOTES
Important Information for Provider:     New ob nurse intake by phone, third pregnancy. History of 2 SAB's . Ultrasound performed at [facility] 11/30/2022, comparable to LMP. Patient has to wait until 1/2023 for NOB appointment due to insurance. NOB with CNM 1/06/2023. Ordered genetic screening. Handouts reviewed.   TSH ordered with NOB ,thyroid cancer in the family( mother)     Prenatal OB Questionnaire  Patient supplied answers from flow sheet for:  Prenatal OB Questionnaire.  Past Medical History  Have you ever received care for your mental health? : No  Have you ever been in a major accident or suffered serious trauma?: No  Within the last year, has anyone hit, slapped, kicked or otherwise hurt you?: No  In the last year, has anyone forced you to have sex when you didn't want to?: No    Past Medical History 2   Have you ever received a blood transfusion?: No  Would you accept a blood transfusion if was medically recommended?: Yes  Does anyone in your home smoke?: No   Is your blood type Rh negative?: No  Have you ever ?: No  Have you been hospitalized for a nonsurgical reason excluding normal delivery?: No  Have you ever had an abnormal pap smear?: No    Past Medical History (Continued)  Do you have a history of abnormalities of the uterus?: No  Did your mother take LIZZ or any other hormones when she was pregnant with you?: No  Do you have any other problems we have not asked about which you feel may be important to this pregnancy?: No                     Allergies as of 12/22/2022:    Allergies as of 12/22/2022 - Reviewed 12/22/2022   Allergen Reaction Noted     Latex Rash 09/04/2015     Adhesive tape Rash 11/02/2018       Current medications are:  Current Outpatient Medications   Medication Sig Dispense Refill     hydrOXYzine (ATARAX) 25 MG tablet Take 1-2 tablets (25-50 mg) by mouth nightly as needed (sleep) (Patient not taking: Reported on 10/10/2022) 30 tablet 1     sertraline (ZOLOFT) 25  MG tablet Take 1 tablet (25 mg) by mouth daily (Patient not taking: Reported on 10/10/2022) 30 tablet 1         Early ultrasound screening tool:    Does patient have irregular periods?  No  Did patient use hormonal birth control in the three months prior to positive urine pregnancy test? No  Is the patient breastfeeding?  No  Is the patient 10 weeks or greater at time of education visit?  Yes

## 2022-12-27 ENCOUNTER — PRE VISIT (OUTPATIENT)
Dept: MATERNAL FETAL MEDICINE | Facility: CLINIC | Age: 27
End: 2022-12-27

## 2023-01-01 LAB
ABO/RH(D): NORMAL
ANTIBODY SCREEN: NEGATIVE
SPECIMEN EXPIRATION DATE: NORMAL

## 2023-01-02 ENCOUNTER — LAB (OUTPATIENT)
Dept: LAB | Facility: CLINIC | Age: 28
End: 2023-01-02
Attending: ADVANCED PRACTICE MIDWIFE
Payer: COMMERCIAL

## 2023-01-02 ENCOUNTER — OFFICE VISIT (OUTPATIENT)
Dept: MATERNAL FETAL MEDICINE | Facility: CLINIC | Age: 28
End: 2023-01-02
Attending: ADVANCED PRACTICE MIDWIFE
Payer: COMMERCIAL

## 2023-01-02 ENCOUNTER — HOSPITAL ENCOUNTER (OUTPATIENT)
Dept: ULTRASOUND IMAGING | Facility: CLINIC | Age: 28
Discharge: HOME | End: 2023-01-02
Attending: ADVANCED PRACTICE MIDWIFE
Payer: COMMERCIAL

## 2023-01-02 DIAGNOSIS — Z80.8 FAMILY HISTORY OF THYROID CANCER: ICD-10-CM

## 2023-01-02 DIAGNOSIS — Z36.9 FIRST TRIMESTER SCREENING: Primary | ICD-10-CM

## 2023-01-02 DIAGNOSIS — Z34.90 ENCOUNTER FOR SUPERVISION OF NORMAL PREGNANCY: ICD-10-CM

## 2023-01-02 DIAGNOSIS — O26.90 PREGNANCY RELATED CONDITION, ANTEPARTUM: ICD-10-CM

## 2023-01-02 DIAGNOSIS — Z36.9 ENCOUNTER FOR ANTENATAL SCREENING: ICD-10-CM

## 2023-01-02 DIAGNOSIS — Z36.9 ENCOUNTER FOR ANTENATAL SCREENING: Primary | ICD-10-CM

## 2023-01-02 LAB
ALBUMIN UR-MCNC: 10 MG/DL
APPEARANCE UR: ABNORMAL
BILIRUB UR QL STRIP: NEGATIVE
COLOR UR AUTO: ABNORMAL
ERYTHROCYTE [DISTWIDTH] IN BLOOD BY AUTOMATED COUNT: 13.2 % (ref 10–15)
GLUCOSE UR STRIP-MCNC: NEGATIVE MG/DL
HCT VFR BLD AUTO: 41.5 % (ref 35–47)
HGB BLD-MCNC: 14.4 G/DL (ref 11.7–15.7)
HGB UR QL STRIP: ABNORMAL
KETONES UR STRIP-MCNC: NEGATIVE MG/DL
LEUKOCYTE ESTERASE UR QL STRIP: ABNORMAL
MCH RBC QN AUTO: 30.8 PG (ref 26.5–33)
MCHC RBC AUTO-ENTMCNC: 34.7 G/DL (ref 31.5–36.5)
MCV RBC AUTO: 89 FL (ref 78–100)
NITRATE UR QL: NEGATIVE
PH UR STRIP: 6 [PH] (ref 5–7)
PLATELET # BLD AUTO: 299 10E3/UL (ref 150–450)
RBC # BLD AUTO: 4.68 10E6/UL (ref 3.8–5.2)
SP GR UR STRIP: 1.01 (ref 1–1.03)
TSH SERPL DL<=0.005 MIU/L-ACNC: 0.8 MU/L (ref 0.4–4)
UROBILINOGEN UR STRIP-MCNC: NORMAL MG/DL
WBC # BLD AUTO: 8.8 10E3/UL (ref 4–11)

## 2023-01-02 PROCEDURE — 96040 HC GENETIC COUNSELING, EACH 30 MINUTES: CPT | Performed by: GENETIC COUNSELOR, MS

## 2023-01-02 PROCEDURE — 76813 OB US NUCHAL MEAS 1 GEST: CPT

## 2023-01-02 PROCEDURE — 76813 OB US NUCHAL MEAS 1 GEST: CPT | Mod: 26 | Performed by: OBSTETRICS & GYNECOLOGY

## 2023-01-02 PROCEDURE — 86803 HEPATITIS C AB TEST: CPT

## 2023-01-02 PROCEDURE — 81003 URINALYSIS AUTO W/O SCOPE: CPT

## 2023-01-02 PROCEDURE — 84443 ASSAY THYROID STIM HORMONE: CPT

## 2023-01-02 PROCEDURE — 87389 HIV-1 AG W/HIV-1&-2 AB AG IA: CPT

## 2023-01-02 PROCEDURE — 85027 COMPLETE CBC AUTOMATED: CPT

## 2023-01-02 PROCEDURE — 86762 RUBELLA ANTIBODY: CPT

## 2023-01-02 PROCEDURE — 86780 TREPONEMA PALLIDUM: CPT

## 2023-01-02 PROCEDURE — 87086 URINE CULTURE/COLONY COUNT: CPT

## 2023-01-02 PROCEDURE — 87340 HEPATITIS B SURFACE AG IA: CPT

## 2023-01-02 PROCEDURE — 86901 BLOOD TYPING SEROLOGIC RH(D): CPT

## 2023-01-02 PROCEDURE — 36415 COLL VENOUS BLD VENIPUNCTURE: CPT

## 2023-01-02 NOTE — PROGRESS NOTES
"[facility]  Genetic Counseling Consult    Patient:  Mally Evans YOB: 1995   Date of Service:  23   MRN: [id number]    Mally was seen at the [facility] [place] for genetic counseling consultation due to discuss the options for routine screening for fetal chromosome abnormalities. The patient was accompanied by her partner, Kimberly to today's visit.     IMPRESSION/ PLAN   1. Mally elected to proceed with NT ultrasound and NIPT through [facility] lab. She opted to screen for sex chromosome aneuploidies including fetal sex. Results are expected in 10-14 days. Mally provided verbal permission for results to be left on her voicemail. She requested the results include predicted fetal sex information.  Patient was informed that results will also be available via [facility].    PREGNANCY HISTORY   /Parity:   Age at Delivery: 27 year old  Gestational Age: 13w2d   MARLEN: 2023, by Last Menstrual Period             MEDICAL HISTORY   Mally s reported medical history is not expected to impact pregnancy management or risks to fetal development.       FAMILY HISTORY   A three-generation pedigree was obtained today and is scanned under the \"Media\" tab in Epic.     The following significant findings were reported today:   Mally's mother was reported to have recently been diagnosed with thyroid cancer. Mally's maternal grandmother was reported to have passed away at age 32 due to breast cancer. Mally believes she may have additional maternal great aunts/uncles with a history of cancer. We discussed how most cancer seen in families occurs sporadically, but about 5-10% may be due to an underlying genetic etiology. Mally was encouraged to share this family history information with her primary care providers to ensure appropriate screening. She was also made aware of the [facility]'s cancer risk management clinic if she or " her family members are interested in more information.     Otherwise, the reported family history is unremarkable for multiple miscarriages, stillbirths, birth defects, intellectual disabilities, known genetic conditions, and consanguinity.       CARRIER SCREENING   Expanded carrier screening is available to screen for autosomal recessive conditions and X-linked conditions in a large list of genes. Autosomal recessive conditions happen when a mutation has been inherited from the egg and sperm and include conditions like cystic fibrosis, thalassemia, hearing loss, spinal muscular atrophy, and more. X-linked conditions happen when a mutation has been inherited from the egg and include conditions like fragile X syndrome. [place] screening was also reviewed.    The couple did not elect to pursue the carrier screening options discussed today, however are aware these remain available.        RISK ASSESSMENT FOR CHROMOSOME CONDITIONS   We explained that the risk for fetal chromosome abnormalities increases with maternal age. We discussed specific features of common chromosome abnormalities, including Down syndrome, trisomy 13, trisomy 18, and sex chromosome trisomies.      At age 27 at midtrimester, the risk to have a baby with Down syndrome is 1 in 990.    At age 27 at midtrimester, the risk to have a baby with any chromosome abnormality is 1 in 495.     GENETIC TESTING OPTIONS   Genetic testing during a pregnancy includes screening and diagnostic procedures.      We discussed the following screening options:   First trimester screening    Ultrasound between [id number] that includes nuchal translucency measurement and nasal bone assessments    Nuchal translucency refers to the space at the back of the neck where fluid builds up. All babies at this stage have fluid and there is only concern if there is too much fluid    Nasal bone refers to the small bone in the nose. There is concern for conditions like Down syndrome if  the bone cannot be seen at all    Blood work from arm for levels of hCG and DANIELLA-A    Screens for trisomy 21 and trisomy 18    Cannot screen for open neural tube defects, maternal serum AFP after 15 weeks is recommended    Non-invasive prenatal testing (NIPT)    Also called cell-free DNA screening because it detects chromosomes from the placenta in the pregnant person's blood    Can be done any time after 10 weeks gestation    Screens for trisomy 21, trisomy 18, trisomy 13, and sex chromosome aneuploidies    Cannot screen for open neural tube defects, maternal serum AFP after 15 weeks is recommended      We discussed the following ultrasound options:  Nuchal translucency (NT) ultrasound    Ultrasound between [id number] that includes nuchal translucency measurement and nasal bone assessments    Nuchal translucency refers to the space at the back of the neck where fluid builds up. All babies at this stage have fluid and there is only concern if there is too much fluid    Nasal bone refers to the small bone in the nose. There is concern for conditions like Down syndrome if the bone cannot be seen at all    This ultrasound can be done as part of first trimester screening, at the same time as another screen (NIPT), at the same time as a CVS, or if the patients does not want genetic screening.    Markers on ultrasound detects about 70% of pregnancies with aneuploidy    Abnormalities on NT ultrasound can also increase the risk for a birth defect, like a heart defect      We discussed the following diagnostic options:   Chorionic villus sampling (CVS)    Invasive diagnostic procedure done between 10w0d and 13w6d    The procedure collects a small sample from the placenta for the purpose of chromosomal testing and/or other genetic testing    Diagnostic result; more than 99% sensitivity for fetal chromosome abnormalities    Cannot screen for open neural tube defects, maternal serum AFP after 15 weeks is  recommended    Amniocentesis    Invasive diagnostic procedure done after 15 weeks gestation    The procedure collects a small sample of amniotic fluid for the purpose of chromosomal testing and/or other genetic testing    Diagnostic result; more than 99% sensitivity for fetal chromosome abnormalities    Testing for AFP in the amniotic fluid can test for open neural tube defects    The benefits and limitations of noninvasive screening were reviewed. Screening tests provide a risk assessment (chance) specific to the pregnancy for certain fetal chromosome abnormalities but cannot definitively diagnose or exclude a fetal chromosome abnormality. Follow-up genetic counseling and consideration of diagnostic testing is recommended with any abnormal screening result. Diagnostic testing during a pregnancy is more certain and can test for more conditions. However, the tests do have a risk of miscarriage that requires careful consideration. These tests can detect fetal chromosome abnormalities with greater than 99% certainty. Results can be compromised by maternal cell contamination or mosaicism and are limited by the resolution of current genetic testing technology. There is no screening or diagnostic test that detects all forms of birth defects or intellectual disability.     It was a pleasure to be involved with Mally gonzalez care. Face-to-face time of the meeting was 25 minutes.    Kathe Carlton MS, [facility]  Licensed Genetic Counselor  [facility]  Maternal Fetal Medicine  Ph: [phone]  [email or web address]

## 2023-01-02 NOTE — PROGRESS NOTES
Please see full imaging report from ViewPoint program under imaging tab.    Gillian Degroot MD  Maternal Fetal Medicine

## 2023-01-03 LAB
HBV SURFACE AG SERPL QL IA: NONREACTIVE
HCV AB SERPL QL IA: NONREACTIVE
HIV 1+2 AB+HIV1 P24 AG SERPL QL IA: NONREACTIVE
RUBV IGG SERPL QL IA: 1.67 INDEX
RUBV IGG SERPL QL IA: POSITIVE
T PALLIDUM AB SER QL: NONREACTIVE

## 2023-01-04 LAB — BACTERIA UR CULT: NORMAL

## 2023-01-06 ENCOUNTER — PRENATAL OFFICE VISIT (OUTPATIENT)
Dept: MIDWIFE SERVICES | Facility: CLINIC | Age: 28
End: 2023-01-06
Payer: COMMERCIAL

## 2023-01-06 ENCOUNTER — LAB (OUTPATIENT)
Dept: LAB | Facility: CLINIC | Age: 28
End: 2023-01-06
Payer: COMMERCIAL

## 2023-01-06 VITALS
OXYGEN SATURATION: 98 % | SYSTOLIC BLOOD PRESSURE: 115 MMHG | DIASTOLIC BLOOD PRESSURE: 76 MMHG | BODY MASS INDEX: 37.23 KG/M2 | WEIGHT: 201.9 LBS | HEART RATE: 85 BPM

## 2023-01-06 DIAGNOSIS — E66.812 CLASS 2 OBESITY WITHOUT SERIOUS COMORBIDITY WITH BODY MASS INDEX (BMI) OF 36.0 TO 36.9 IN ADULT, UNSPECIFIED OBESITY TYPE: ICD-10-CM

## 2023-01-06 DIAGNOSIS — R39.15 URINARY URGENCY: ICD-10-CM

## 2023-01-06 DIAGNOSIS — Z34.00 ENCOUNTER FOR SUPERVISION OF NORMAL FIRST PREGNANCY, UNSPECIFIED TRIMESTER: Primary | ICD-10-CM

## 2023-01-06 LAB
ALBUMIN UR-MCNC: NEGATIVE MG/DL
APPEARANCE UR: CLEAR
BILIRUB UR QL STRIP: NEGATIVE
COLOR UR AUTO: NORMAL
GLUCOSE UR STRIP-MCNC: NEGATIVE MG/DL
HGB UR QL STRIP: NEGATIVE
KETONES UR STRIP-MCNC: NEGATIVE MG/DL
LEUKOCYTE ESTERASE UR QL STRIP: NEGATIVE
NITRATE UR QL: NEGATIVE
PH UR STRIP: 6 [PH] (ref 5–7)
SP GR UR STRIP: 1.01 (ref 1–1.03)
UROBILINOGEN UR STRIP-MCNC: NORMAL MG/DL

## 2023-01-06 PROCEDURE — 99213 OFFICE O/P EST LOW 20 MIN: CPT | Performed by: ADVANCED PRACTICE MIDWIFE

## 2023-01-06 PROCEDURE — 87086 URINE CULTURE/COLONY COUNT: CPT

## 2023-01-06 PROCEDURE — 36415 COLL VENOUS BLD VENIPUNCTURE: CPT | Performed by: ADVANCED PRACTICE MIDWIFE

## 2023-01-06 PROCEDURE — 82306 VITAMIN D 25 HYDROXY: CPT | Performed by: ADVANCED PRACTICE MIDWIFE

## 2023-01-06 PROCEDURE — 81003 URINALYSIS AUTO W/O SCOPE: CPT

## 2023-01-06 RX ORDER — ASPIRIN 81 MG/1
81 TABLET, CHEWABLE ORAL DAILY
Qty: 180 TABLET | Refills: 0 | Status: SHIPPED | OUTPATIENT
Start: 2023-01-06 | End: 2023-05-24

## 2023-01-06 ASSESSMENT — PATIENT HEALTH QUESTIONNAIRE - PHQ9: SUM OF ALL RESPONSES TO PHQ QUESTIONS 1-9: 14

## 2023-01-06 NOTE — PROGRESS NOTES
13w6d  Mally is here with partner for NOB visit. Feeling well, no concerns. Reviewed labs. Concerned about UA because she is having feeling of urinary urgency. UC negative. Will recheck today due to ongoing symptoms but reviewed normal physiology of pregnancy and could be pressure on bladder from uterus. Will order Level II fetal survey to be scheduled between 18-20 weeks, already has next appointment scheduled with Dr Teran. Reviewed schedule of visits, call rotation and telephone numbers to call. RTC in 4-6 weeks for PNV and fetal survey. MML    13w6d   Mally Evans is a 27 year old who presents to the clinic for an new ob visit.   Estimated Date of Delivery: Jul 8, 2023 is calculated from Patient's last menstrual period was 10/01/2022 (exact date).     She has not had bleeding since her LMP.   She has had mild nausea. Weigh loss has not occurred.   FOB is involved,  present   OTHER CONCERNS:     INFECTION HISTORY  HIV: no  Hepatitis B: no  Hepatitis C: no  Syphilis:  no  Tuberculosis: no   PPD- no  Herpes self: no  Herpes partner:  no  Chlamydia:  no  Gonorrhea:  no  HPV: no  BV:  no  Trichomonis:  no  Chicken Pox:  YES  ====================================================  GENETIC SCREENING  Genetic screening reviewed. High Risk? na  ====================================================  PERSONAL/SOCIAL HISTORY  Lives lives with their spouse.  HX OF ABUSE: no  =====================================================   REVIEW OF SYSTEMS  CONSTITUTIONAL: NEGATIVE for fever, chills  EYES: NEGATIVE for vision changes   RESP: NEGATIVE for significant cough or SOB  CV: NEGATIVE for chest pain, palpitations   GI: NEGATIVE for nausea, abdominal pain, heartburn, or change in bowel habits  : NEGATIVE for frequency, dysuria, or hematuria. POSITIVE for urinary urgency.  MUSCULOSKELETAL: NEGATIVE for significant arthralgias or myalgia  NEURO: NEGATIVE for weakness, dizziness or paresthesias or  headache  ====================================================  PHYSICAL EXAM:  /76   Pulse 85   Wt 91.6 kg (201 lb 14.4 oz)   LMP 10/01/2022 (Exact Date)   SpO2 98%   BMI 37.23 kg/m    BMI- Body mass index is 37.23 kg/m .,     RECOMMENDED WEIGHT GAIN: < 15 lbs.  GENERAL:  Pleasant pregnant female, alert, well groomed.   SKIN:  Warm and dry, without lesions or rashes  HEAD: Symmetrical features.  NECK:  Thyroid without enlargement and nodules.  Lymph nodes not palpable.  LUNGS:  Clear to auscultation.  HEART:  RRR without murmur.  ABDOMEN: Soft without masses , tenderness or organomegaly.  No CVA tenderness. No scars noted.     MUSCULOSKELETAL:  Full range of motion  EXTREMITIES:  No edema. No significant varicosities.   =========================================  ASSESSMENT:  13w6d  (Z34.00) Encounter for supervision of normal first pregnancy, unspecified trimester  (primary encounter diagnosis)  Comment:   Plan:     (R39.15) Urinary urgency  Comment:   Plan: UA reflex to Microscopic - lab collect, Urine         Culture Aerobic Bacterial - lab collect  ==========================================  PLAN:  Instructed on use of triage nurse line and contacting the on call CNM after hours for an urgent need such as fever, vagina bleeding, bladder or vaginal infection, rupture of membranes,  or term labor.    Instructed on best evidence for: weight gain for her BMI for pregnancy; healthy diet and foods to avoid; exercise and activity during pregnancy;avoiding exposure to toxoplasmosis; and maintenance of a generally healthy lifestyle.   Discussed the harms, benefits, side effects and alternative therapies for current prescribed and OTC medications.  ROSENDO Barker CNM

## 2023-01-06 NOTE — PATIENT INSTRUCTIONS
Franklin Bal,     As I was charting I realized that I missed something. We know that BMI is not the best indicator or overall health but it is used in studies for pregnancy and there are some risks that are linked to elevated BMI. One of those risks is the development of blood pressure issues in pregnancy. Studies have found that taking a daily baby aspirin helps to prevent this. I will send a prescription to your pharmacy and you can start taking one tablet daily.     Also, I put in the order for your ultrasound and you will receive a call to get that scheduled. It will be a Level II US and will be in the same clinic building but on the 4th floor.     Please let us know if you have any questions.   Thanks,  Marguerite Grady CNM

## 2023-01-07 LAB — DEPRECATED CALCIDIOL+CALCIFEROL SERPL-MC: 26 UG/L (ref 20–75)

## 2023-01-08 LAB — BACTERIA UR CULT: NORMAL

## 2023-01-09 ENCOUNTER — TRANSCRIBE ORDERS (OUTPATIENT)
Dept: MATERNAL FETAL MEDICINE | Facility: CLINIC | Age: 28
End: 2023-01-09

## 2023-01-09 DIAGNOSIS — O26.90 PREGNANCY RELATED CONDITION, ANTEPARTUM: Primary | ICD-10-CM

## 2023-01-11 ENCOUNTER — TELEPHONE (OUTPATIENT)
Dept: MATERNAL FETAL MEDICINE | Facility: CLINIC | Age: 28
End: 2023-01-11

## 2023-01-11 LAB — SCANNED LAB RESULT: NORMAL

## 2023-01-11 NOTE — TELEPHONE ENCOUNTER
January 11, 2023  Left a message for Mally regarding her NIPT results.     Results indicate NO ANEUPLOIDY DETECTED for chromosomes 21, 18, 13, or sex chromosomes (XY). Predicted sex of baby information left in voicemail per patient request.     This puts her current pregnancy at low risk for Down syndrome, trisomy 18, trisomy 13 and sex chromosome abnormalities. This test is reported to have the following sensitivities: Down syndrome: 99.99%, trisomy 18: 99.99%, and trisomy 13: 99.99%. Although these results are reassuring, this does not replace a standard chromosome analysis from a chorionic villus sampling or amniocentesis.     MSAFP is the appropriate second trimester screening test for open neural tube defects; the maternal quad screen is not recommended.    Her results are available in her Epic chart for her primary OB to review.    This information was left in Mally's voicemail per plan established at her genetic counseling appointment, and Mally was encouraged to reach out if she has any questions or concerns.      Kathe Carlton MS, [facility]  Licensed Genetic Counselor  [facility]  Maternal Fetal Medicine  Ph: [phone]  [email or web address]

## 2023-02-03 ENCOUNTER — TELEPHONE (OUTPATIENT)
Dept: BEHAVIORAL HEALTH | Facility: CLINIC | Age: 28
End: 2023-02-03

## 2023-02-03 ENCOUNTER — PRENATAL OFFICE VISIT (OUTPATIENT)
Dept: MIDWIFE SERVICES | Facility: CLINIC | Age: 28
End: 2023-02-03
Payer: COMMERCIAL

## 2023-02-03 VITALS
OXYGEN SATURATION: 97 % | DIASTOLIC BLOOD PRESSURE: 81 MMHG | SYSTOLIC BLOOD PRESSURE: 132 MMHG | HEART RATE: 105 BPM | BODY MASS INDEX: 38.72 KG/M2 | WEIGHT: 210 LBS

## 2023-02-03 DIAGNOSIS — F32.A DEPRESSION AFFECTING PREGNANCY IN SECOND TRIMESTER, ANTEPARTUM: ICD-10-CM

## 2023-02-03 DIAGNOSIS — O99.342 DEPRESSION AFFECTING PREGNANCY IN SECOND TRIMESTER, ANTEPARTUM: ICD-10-CM

## 2023-02-03 DIAGNOSIS — R21 RASH: ICD-10-CM

## 2023-02-03 DIAGNOSIS — Z34.02 ENCOUNTER FOR SUPERVISION OF NORMAL FIRST PREGNANCY, SECOND TRIMESTER: Primary | ICD-10-CM

## 2023-02-03 PROCEDURE — 36415 COLL VENOUS BLD VENIPUNCTURE: CPT | Performed by: ADVANCED PRACTICE MIDWIFE

## 2023-02-03 PROCEDURE — 99207 PR PRENATAL VISIT: CPT | Performed by: ADVANCED PRACTICE MIDWIFE

## 2023-02-03 PROCEDURE — 82105 ALPHA-FETOPROTEIN SERUM: CPT | Mod: 90 | Performed by: ADVANCED PRACTICE MIDWIFE

## 2023-02-03 PROCEDURE — 99000 SPECIMEN HANDLING OFFICE-LAB: CPT | Performed by: ADVANCED PRACTICE MIDWIFE

## 2023-02-03 RX ORDER — BENZOCAINE/MENTHOL 6 MG-10 MG
LOZENGE MUCOUS MEMBRANE 2 TIMES DAILY
Qty: 14 G | Refills: 0 | Status: SHIPPED | OUTPATIENT
Start: 2023-02-03 | End: 2023-05-24

## 2023-02-03 ASSESSMENT — PATIENT HEALTH QUESTIONNAIRE - PHQ9
SUM OF ALL RESPONSES TO PHQ QUESTIONS 1-9: 16
SUM OF ALL RESPONSES TO PHQ QUESTIONS 1-9: 16
10. IF YOU CHECKED OFF ANY PROBLEMS, HOW DIFFICULT HAVE THESE PROBLEMS MADE IT FOR YOU TO DO YOUR WORK, TAKE CARE OF THINGS AT HOME, OR GET ALONG WITH OTHER PEOPLE: VERY DIFFICULT

## 2023-02-03 NOTE — PROGRESS NOTES
"17w6d  Mally is here with her partner for prenatal visit. AFP collected as last piece of genetic screening. Mally is feeling well, starting to feel fetal movement. Denies bleeding, cramping, leaking of fluid, severe headaches. Has had a rash on her back for several months. Has some red patches and small, pin-point sores. States she gets massages twice weekly at her chiropractor; wondering if the massage oil may be the issue. Recommended having them use something different, also send prescription for 1% hydrocortisone cream.    PHQ-9 alert received prior to her visit for positive ques 9. She states that she is not suicidal, does not have SI and has no plan to hurt herself. Feels that it is more \"thinking about dying\" or how she could die (like a car accident). She is not currently on medication, and isn't interested at this time. Discussed that there are some options that are safe and well studied in pregnancy, and that we know that babies do better when their moms do better. Also offered clinic [facility]. She is interested in talking to Jena [facility]; Jena will call her this afternoon. Mental Health referral placed. She has fetal survey scheduled with [facility]. Return to care 4 weeks.    Depression Screening Follow-up    PHQ 2/3/2023   PHQ-9 Total Score 16   Q9: Thoughts of better off dead/self-harm past 2 weeks More than half the days   F/U: Thoughts of suicide or self-harm No   F/U: Safety concerns No       Follow Up  Follow Up Actions Taken  Scheduled appointment with [facility]  Mental Health Referral placed    Denver Krueger CNM    "

## 2023-02-08 LAB
# FETUSES US: NORMAL
AFP MOM SERPL: 1.41
AFP SERPL-MCNC: 49 NG/ML
AGE - REPORTED: 26.9 YR
CURRENT SMOKER: NO
FAMILY MEMBER DISEASES HX: NO
GA METHOD: NORMAL
GA: NORMAL WK
IDDM PATIENT QL: NO
INTEGRATED SCN PATIENT-IMP: NORMAL
SPECIMEN DRAWN SERPL: NORMAL

## 2023-02-09 ENCOUNTER — HOSPITAL ENCOUNTER (OUTPATIENT)
Dept: ULTRASOUND IMAGING | Facility: CLINIC | Age: 28
Discharge: HOME | End: 2023-02-09
Attending: ADVANCED PRACTICE MIDWIFE
Payer: COMMERCIAL

## 2023-02-09 ENCOUNTER — OFFICE VISIT (OUTPATIENT)
Dept: MATERNAL FETAL MEDICINE | Facility: CLINIC | Age: 28
End: 2023-02-09
Attending: ADVANCED PRACTICE MIDWIFE
Payer: COMMERCIAL

## 2023-02-09 DIAGNOSIS — O99.212 OBESITY AFFECTING PREGNANCY IN SECOND TRIMESTER: Primary | ICD-10-CM

## 2023-02-09 DIAGNOSIS — O26.90 PREGNANCY RELATED CONDITION, ANTEPARTUM: ICD-10-CM

## 2023-02-09 PROCEDURE — 76811 OB US DETAILED SNGL FETUS: CPT | Mod: 26 | Performed by: OBSTETRICS & GYNECOLOGY

## 2023-02-09 PROCEDURE — 76811 OB US DETAILED SNGL FETUS: CPT

## 2023-02-09 NOTE — PROGRESS NOTES
"Please see \"Imaging\" tab under \"Chart Review\" for details of today's US.    Melissa Rangel, DO    "

## 2023-02-10 ENCOUNTER — TELEPHONE (OUTPATIENT)
Dept: OBGYN | Facility: CLINIC | Age: 28
End: 2023-02-10
Payer: COMMERCIAL

## 2023-02-10 NOTE — TELEPHONE ENCOUNTER
18w6d    Fall on ice, fell on knee  Didn't hit belly or head  Denies any pain or cramping, denies any LOF or bleeding.  Routing to provider to advise.  Carlita Nassar RN on 2/10/2023 at 2:26 PM

## 2023-02-10 NOTE — TELEPHONE ENCOUNTER
Called pt back after getting message returned from SANDRA Clayton, relayed message. Instructed to contact us if she falls again or have any leaking of fluid, bleeding, or new belly or back pain to contact the clinic.   Carlita Nassar RN on 2/10/2023 at 2:45 PM

## 2023-02-10 NOTE — TELEPHONE ENCOUNTER
Patient can monitor from home being she is under 24 weeks and also very low risk due to not hitting her abdomen. Do not expect any complications from fall. Thanks, ROSENDO Sotomayor CNM

## 2023-02-16 ENCOUNTER — OFFICE VISIT (OUTPATIENT)
Dept: BEHAVIORAL HEALTH | Facility: CLINIC | Age: 28
End: 2023-02-16
Payer: COMMERCIAL

## 2023-02-16 DIAGNOSIS — F34.1 PERSISTENT DEPRESSIVE DISORDER: Primary | ICD-10-CM

## 2023-02-16 DIAGNOSIS — F41.1 GENERALIZED ANXIETY DISORDER: ICD-10-CM

## 2023-02-16 PROCEDURE — 90834 PSYTX W PT 45 MINUTES: CPT

## 2023-02-16 ASSESSMENT — ANXIETY QUESTIONNAIRES
4. TROUBLE RELAXING: NEARLY EVERY DAY
6. BECOMING EASILY ANNOYED OR IRRITABLE: SEVERAL DAYS
2. NOT BEING ABLE TO STOP OR CONTROL WORRYING: SEVERAL DAYS
7. FEELING AFRAID AS IF SOMETHING AWFUL MIGHT HAPPEN: MORE THAN HALF THE DAYS
IF YOU CHECKED OFF ANY PROBLEMS ON THIS QUESTIONNAIRE, HOW DIFFICULT HAVE THESE PROBLEMS MADE IT FOR YOU TO DO YOUR WORK, TAKE CARE OF THINGS AT HOME, OR GET ALONG WITH OTHER PEOPLE: SOMEWHAT DIFFICULT
8. IF YOU CHECKED OFF ANY PROBLEMS, HOW DIFFICULT HAVE THESE MADE IT FOR YOU TO DO YOUR WORK, TAKE CARE OF THINGS AT HOME, OR GET ALONG WITH OTHER PEOPLE?: SOMEWHAT DIFFICULT
5. BEING SO RESTLESS THAT IT IS HARD TO SIT STILL: SEVERAL DAYS
GAD7 TOTAL SCORE: 11
1. FEELING NERVOUS, ANXIOUS, OR ON EDGE: MORE THAN HALF THE DAYS
7. FEELING AFRAID AS IF SOMETHING AWFUL MIGHT HAPPEN: MORE THAN HALF THE DAYS
GAD7 TOTAL SCORE: 11
GAD7 TOTAL SCORE: 11
3. WORRYING TOO MUCH ABOUT DIFFERENT THINGS: SEVERAL DAYS

## 2023-02-16 ASSESSMENT — PATIENT HEALTH QUESTIONNAIRE - PHQ9
SUM OF ALL RESPONSES TO PHQ QUESTIONS 1-9: 18
10. IF YOU CHECKED OFF ANY PROBLEMS, HOW DIFFICULT HAVE THESE PROBLEMS MADE IT FOR YOU TO DO YOUR WORK, TAKE CARE OF THINGS AT HOME, OR GET ALONG WITH OTHER PEOPLE: SOMEWHAT DIFFICULT
SUM OF ALL RESPONSES TO PHQ QUESTIONS 1-9: 18
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
## 2023-02-17 NOTE — PATIENT INSTRUCTIONS
Crisis Resources   They are available 24 hours a day, 7 days a week unless noted otherwise.    988 National Suicide and Crisis Lifeline   If you or someone you know needs support now, call or text 988 or chat [employer]org. 988 connects you with a trained crisis counselor who can help.    Crisis Text Line    www.crisistextline.org  Text HOME to 794358 from anywhere in the [place], anytime, about any type of crisis. A live, trained crisis counselor will receive the text and respond quickly.    [facility]  [phone]  Urgent mental health crisis support, including mobile teams that can come to where you are.    Call CRISIS (030671) from anywhere in the [facility] to reach the local [facility] crisis team.    [facility] Unit at [facility]-based services for mental health crisis support. Check into the emergency department at [facility] and they will help transfer to EmPath, which is set up as an alternative to your typical emergency department.

## 2023-02-20 ASSESSMENT — COLUMBIA-SUICIDE SEVERITY RATING SCALE - C-SSRS
REASONS FOR IDEATION LIFETIME: DOES NOT APPLY
ATTEMPT LIFETIME: NO
1. IN THE PAST MONTH, HAVE YOU WISHED YOU WERE DEAD OR WISHED YOU COULD GO TO SLEEP AND NOT WAKE UP?: SEE ABOVE.
TOTAL  NUMBER OF INTERRUPTED ATTEMPTS LIFETIME: NO
2. HAVE YOU ACTUALLY HAD ANY THOUGHTS OF KILLING YOURSELF?: NO
1. IN THE PAST MONTH, HAVE YOU WISHED YOU WERE DEAD OR WISHED YOU COULD GO TO SLEEP AND NOT WAKE UP?: YES
TOTAL  NUMBER OF ABORTED OR SELF INTERRUPTED ATTEMPTS LIFETIME: NO
6. HAVE YOU EVER DONE ANYTHING, STARTED TO DO ANYTHING, OR PREPARED TO DO ANYTHING TO END YOUR LIFE?: NO
1. HAVE YOU WISHED YOU WERE DEAD OR WISHED YOU COULD GO TO SLEEP AND NOT WAKE UP?: YES
REASONS FOR IDEATION PAST MONTH: DOES NOT APPLY

## 2023-02-23 ENCOUNTER — PRENATAL OFFICE VISIT (OUTPATIENT)
Dept: OBGYN | Facility: CLINIC | Age: 28
End: 2023-02-23
Payer: COMMERCIAL

## 2023-02-23 VITALS
DIASTOLIC BLOOD PRESSURE: 79 MMHG | WEIGHT: 215 LBS | HEART RATE: 81 BPM | TEMPERATURE: 98 F | SYSTOLIC BLOOD PRESSURE: 117 MMHG | BODY MASS INDEX: 39.64 KG/M2

## 2023-02-23 DIAGNOSIS — M25.532 LEFT WRIST PAIN: ICD-10-CM

## 2023-02-23 DIAGNOSIS — Z34.02 ENCOUNTER FOR SUPERVISION OF NORMAL FIRST PREGNANCY IN SECOND TRIMESTER: Primary | ICD-10-CM

## 2023-02-23 DIAGNOSIS — O99.212 OBESITY AFFECTING PREGNANCY IN SECOND TRIMESTER: ICD-10-CM

## 2023-02-23 PROCEDURE — 99207 PR PRENATAL VISIT: CPT | Performed by: OBSTETRICS & GYNECOLOGY

## 2023-02-23 NOTE — PROGRESS NOTES
20w5d  Here with FOB [place]  Patient interested in transferring to MD care  Left wrist is painful x1 week. Radial aspect down to thumb. De quervain tenosynovitis? Recommend OT eval and brace. Also rec appt with family practice.   Rash on back since Dec. Has not been able to reach back to put it on. Diffuse maculopapular rash on back. Itchy during massages (does not matter oil vs lotion). Recommend trying hydrocortisone, benadryl, unscented products.  A little fetal movement. No bleeding or cramping.   Fetal survey, NIPT and msAFP wnl. Recommend third tri growth and BPPs at 37w for obesity.  She found talking with Jena helpful and has follow up appt with her.  RTC 4 weeks, sooner PRN. Gtt next appt.  Rachna Teran MD

## 2023-02-23 NOTE — PATIENT INSTRUCTIONS
Online resources and birth classes  Free videos: [email or web address]/having-[email or web address]-[email or web address]-videos  Connie: [email or web address]  Birthly: https://www.[employer]/  Motherboard: https://www.[employer].com/  Breastfeeding resource: https://kellymom.com/

## 2023-02-24 ENCOUNTER — TELEPHONE (OUTPATIENT)
Dept: PSYCHIATRY | Facility: CLINIC | Age: 28
End: 2023-02-24
Payer: COMMERCIAL
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
## 2023-02-27 ENCOUNTER — TRANSCRIBE ORDERS (OUTPATIENT)
Dept: MATERNAL FETAL MEDICINE | Facility: CLINIC | Age: 28
End: 2023-02-27
Payer: COMMERCIAL

## 2023-02-27 DIAGNOSIS — O26.90 PREGNANCY RELATED CONDITION, ANTEPARTUM: Primary | ICD-10-CM

## 2023-03-02 ENCOUNTER — OFFICE VISIT (OUTPATIENT)
Dept: BEHAVIORAL HEALTH | Facility: CLINIC | Age: 28
End: 2023-03-02
Payer: COMMERCIAL

## 2023-03-02 DIAGNOSIS — F34.1 PERSISTENT DEPRESSIVE DISORDER: Primary | ICD-10-CM

## 2023-03-02 PROCEDURE — 90834 PSYTX W PT 45 MINUTES: CPT

## 2023-03-03 NOTE — PROGRESS NOTES
Hand Therapy Initial Evaluation    Current Date:  3/6/2023    Diagnosis: Left wrist pain   DOI: 2/23/23 (script date); referred by Rachna Al (brace of left wrist pain)  Precautions: no US due to pregnancy, adhesive allergy     Subjective:  Mally Evans is a 27 year old female.    Patient reports symptoms of the left wrist pain which occurred due to pregnancy. Since onset symptoms are Unchanged.     Answers for HPI/ROS submitted by the patient on 3/6/2023  Reason for Visit:: Pain in wrist - De Quervain's tenosynovitis  How problem occurred:: I believe it is pregnancy related  Number scale: 6/10  General health as reported by patient: good  Please check all that apply to your current or past medical history: depression, migraines/headaches, overweight, currently pregnant  Medical allergies: latex, adhesives  Surgeries: other  Other Surgery Detail: Gall bladder removal  Occupation:: Dental   What are your primary job tasks: computer work, prolonged sitting, repetitive tasks    Current occupation is dental    Job Tasks: Computer Work    Occupational Profile Information:  Right hand dominant  Prior functional level:  no limitations  Patient reports symptoms of pain and edema  Special tests:  none.    Previous treatment: KT tape  Barriers include:none  Mobility: No difficulty  Transportation: drives  Currently working in normal job without restrictions    Functional Outcome Measure:   Upper Extremity Functional Index Score:  SCORE:   Column Totals: /80: (P) 62   (A lower score indicates greater disability.)    Objective:  Pain Level (Scale 0-10):   3/6/2023   At Rest 1-2/10    With Use 6-7/10      Pain Description:  Date 3/6/2023   Location L wrist and thumb (dorsal and radial aspect)    Pain Quality Aching   Frequency constant     Pain is worst  daytime or nighttime   Exacerbated by  with use    Relieved by Rest, massage    Progression Unchanged      Sensation   WNL  throughout all nerve distributions; per patient report    Edema   Intermittent mild edema in left thumb (dorsal/radial aspect)     Palpation  Pain Report:  - none    + mild    ++ moderate    +++ severe    3/6/2023    L   Radial Styloid +   1st DC ++/+++   FCR -    Thumb CMC ++   Extensor Wad -      ROM  Pain Report: - none  + mild    ++ moderate    +++ severe   Thumb 3/6/2023 3/6/2023   AROM (PROM) R  L    MP /50 /40, ++   IP /65 /45, ++   RABD 60 62   PABD 65 63, ++     WFL L wrist ROM in all planes; limited by moderate pain per patient     Resisted Testing  Pain Report: - none  + mild    ++ moderate    +++ severe    3/6/2023    L    APL ++   EPB ++   EPL ++     Special Tests   Pain Report:  - none    + mild    ++ moderate    +++ severe    3/6/2023    L   Finkelsteins +   Radial Nerve Tinel's (DRSN) -   Ap Joint Laxity of Trapezium -   Crepitus Present -   WHAT test +     Strength   (Measured in pounds)  Testing deferred due to pain     Assessment:  Patient presents with symptoms consistent with diagnosis of left wrist pain, with conservative intervention.     Patient's limitations or Problem List includes:  Pain, Decreased ROM/motion, Increased edema and Tightness in musculature of the left wrist which interferes with the patient's ability to perform Self Care Tasks (dressing), Work Tasks, Sleep Patterns, Recreational Activities, Household Chores and Driving  as compared to previous level of function.    Rehab Potential:  Excellent - Return to full activity, no limitations    Patient will benefit from skilled Occupational Therapy to increase ROM, flexibility, overall strength and stability of thumb and decrease pain, edema and tightness in musculature to return to previous activity level and resume normal daily tasks and to reach their rehab potential.    Barriers to Learning:  No barrier    Communication Issues:  Patient appears to be able to clearly communicate and understand verbal and written communication and  follow directions correctly.    Chart Review: Brief history including review of medical and/or therapy records relating to the presenting problem    Identified Performance Deficits: dressing, functional mobility, driving and community mobility, home establishment and management, meal preparation and cleanup, shopping, work and leisure activities    Assessment of Occupational Performance:  5 or more Performance Deficits    Clinical Decision Making (Complexity): Low complexity    Treatment Explanation:  The following has been discussed with the patient:  RX ordered/plan of care  Anticipated outcomes  Possible risks and side effects    Plan:  Frequency:  1 X week, once daily  Duration:  for 10 weeks    Treatment Plan:    Modalities:    Paraffin   Therapeutic Exercise:    AROM, AAROM, PROM, Tendon Gliding, Blocking, Reverse Blocking, Place and Hold, Isotonics and Isometrics  Neuromuscular re-ed:   Isometrics and Stabilization  Manual Techniques:   Friction massage, Myofascial release and Manual edema mobilization  Orthotic Fabrication:    Static and Forearm based  Self Care:    Self Care Tasks, Ergonomic Considerations and Work Tasks    Discharge Plan:  Achieve all LTG.  Independent in home treatment program.  Reach maximal therapeutic benefit.    Discharge Plan:    Achieve all LTG.  Independent in home treatment program.  Reach maximal therapeutic benefit.    Home Exercise Program:  Orthosis Wear and Care  Notes:  Full-time splint wear for the next 4 weeks;  can remove for hygiene and massage  Friction Massage  Sets: 4-5xday  Repetitions: 3-5 minute per session;  Notes:Massage along the forearm and thumb with mild- moderate pressure using a tool;  Icing    Next Visit:  Reassess pain   Check splint fit   STM to APL and EPB   Gentle AA/AROM wrist exercises

## 2023-03-06 ENCOUNTER — THERAPY VISIT (OUTPATIENT)
Dept: OCCUPATIONAL THERAPY | Facility: CLINIC | Age: 28
End: 2023-03-06
Attending: OBSTETRICS & GYNECOLOGY
Payer: COMMERCIAL

## 2023-03-06 DIAGNOSIS — M25.532 LEFT WRIST PAIN: ICD-10-CM

## 2023-03-06 PROCEDURE — 97165 OT EVAL LOW COMPLEX 30 MIN: CPT | Mod: GO

## 2023-03-06 PROCEDURE — 97760 ORTHOTIC MGMT&TRAING 1ST ENC: CPT | Mod: GO

## 2023-03-07 NOTE — PROGRESS NOTES
[facility] Ob/[facility]  March 2, 2023  Behavioral Health Clinician Progress Note    Patient Name: Mally Evans           Service Type:  Individual      Service Location:   Face to Face in Clinic     Session Start Time: 3:30 PM  Session End Time: 4:20 PM      Session Length: 38 - 52      Attendees: Patient     Service Modality:  In-person    Visit Activities (Refresh list every visit): [facility] Only    Diagnostic Assessment Date: Deferred - patient will be establishing care with outpatient provider on 3/24/23  Treatment Plan Review Date: Not yet created   See Flowsheets for today's PHQ-9 and JENNY-7 results  Previous PHQ-9:   PHQ-9 SCORE 1/6/2023 2/3/2023 2/16/2023   PHQ-9 Total Score MyChart - 16 (Moderately severe depression) 18 (Moderately severe depression)   PHQ-9 Total Score 14 16 18     Previous JENNY-7:   JENNY-7 SCORE 12/29/2021 2/16/2023   Total Score - 11 (moderate anxiety)   Total Score 18 11       AISSATOU LEVEL:  No flowsheet data found.    DATA  Extended Session (60+ minutes): No  Interactive Complexity: No  Crisis: No  [facility] Patient: No    Treatment Objective(s) Addressed in This Session:  Target Behavior(s): management of mental health symptoms during pregnancy    Relationship Problems: process grief involved in family estrangement    Current Stressors / Issues:  Patient described current distress after receiving a text from her sister (from whom patient is estranged) about an aunt who is in failing health. She expressed feelings of ambivalence about traveling to [place] to visit her aunt before she passes away, as she desires to avoid seeing immediate family. [facility] validated the difficulty of patient's conflict and assisted in processing and normalizing patient's grief for her estranged family relationships.     Progress on Treatment Objective(s) / Homework:  Minimal progress - ACTION (Actively working towards change); Intervened by reinforcing change plan / affirming steps taken - establish care with  therapy provider (intake appointment has been scheduled), develop/strengthen coping strategies as she navigates estranged family relationships    Motivational Interviewing    MI Intervention: Expressed Empathy/Understanding, Supported Autonomy, Collaboration, Evocation, Open-ended questions, Reflections: simple and complex and Change talk (evoked)     Change Talk Expressed by the Patient: Desire to change Taking steps    Provider Response to Change Talk: E - Evoked more info from patient about behavior change, A - Affirmed patient's thoughts, decisions, or attempts at behavior change, R - Reflected patient's change talk and S - Summarized patient's change talk statements    Also provided psychoeducation about behavioral health condition, symptoms, and treatment options    Care Plan review completed: Yes    Medication Review:  No current psychiatric medications prescribed    Medication Compliance:  NA    Changes in Health Issues:  Currently pregnant - 21w5d    Chemical Use Review:   Substance Use: Chemical use reviewed, no active concerns identified      Tobacco Use: No current tobacco use.      Assessment: Current Emotional / Mental Status (status of significant symptoms):  Risk status (Self / Other harm or suicidal ideation)  Patient denies a history of suicidal ideation, suicide attempts, self-injurious behavior, homicidal ideation, homicidal behavior and and other safety concerns  Patient denies current fears or concerns for personal safety.  Patient denies current or recent suicidal ideation or behaviors.   Patient denies current or recent homicidal ideation or behaviors.  Patient denies current or recent self injurious behavior or ideation.  Patient denies other safety concerns.  A safety and risk management plan has not been developed at this time, however patient was encouraged to [place] [facility] should there be a change in any of these risk factors.    Appearance:   Appropriate   Eye Contact:   Good    Psychomotor Behavior: Normal   Attitude:   Cooperative  Interested Pleasant  Orientation:   All  Speech   Rate / Production: Normal/ Responsive Emotional   Volume:  Normal   Mood:    Dysphoric Grieving  Affect:    Appropriate  Tearful  Thought Content:  Clear   Thought Form:  Coherent  Goal Directed  Logical   Insight:    Good     Diagnoses:  1. Persistent depressive disorder     Provisional     Collateral Reports Completed:  Not Applicable    Plan: (Homework, other):  Plan for one more [facility] visit to close care episode, discuss transition, and review any further interest for support resources (patient may cancel this appointment if she feels it is not needed). [facility] will look into support resources for family estrangement. Patient is scheduled for intake with outpatient therapist on 3/24/23. CD Recommendations: No indications of CD issues.     AMBER Bullock, [facility]

## 2023-03-20 ENCOUNTER — PRENATAL OFFICE VISIT (OUTPATIENT)
Dept: OBGYN | Facility: CLINIC | Age: 28
End: 2023-03-20
Payer: COMMERCIAL

## 2023-03-20 VITALS
BODY MASS INDEX: 41.49 KG/M2 | WEIGHT: 225 LBS | SYSTOLIC BLOOD PRESSURE: 132 MMHG | DIASTOLIC BLOOD PRESSURE: 79 MMHG | TEMPERATURE: 98.1 F | HEART RATE: 93 BPM

## 2023-03-20 DIAGNOSIS — R21 RASH: ICD-10-CM

## 2023-03-20 DIAGNOSIS — R73.09 ELEVATED GLUCOSE TOLERANCE TEST: ICD-10-CM

## 2023-03-20 DIAGNOSIS — Z34.02 ENCOUNTER FOR SUPERVISION OF NORMAL FIRST PREGNANCY IN SECOND TRIMESTER: Primary | ICD-10-CM

## 2023-03-20 LAB
GLUCOSE 1H P 50 G GLC PO SERPL-MCNC: 179 MG/DL (ref 70–129)
HGB BLD-MCNC: 11.8 G/DL (ref 11.7–15.7)
HOLD SPECIMEN: NORMAL

## 2023-03-20 PROCEDURE — 82950 GLUCOSE TEST: CPT | Performed by: OBSTETRICS & GYNECOLOGY

## 2023-03-20 PROCEDURE — 99207 PR PRENATAL VISIT: CPT | Performed by: OBSTETRICS & GYNECOLOGY

## 2023-03-20 PROCEDURE — 36415 COLL VENOUS BLD VENIPUNCTURE: CPT | Performed by: OBSTETRICS & GYNECOLOGY

## 2023-03-20 RX ORDER — HYDROCORTISONE 2.5 %
CREAM (GRAM) TOPICAL 2 TIMES DAILY
Qty: 60 G | Refills: 3 | Status: ON HOLD | OUTPATIENT
Start: 2023-03-20 | End: 2023-07-03

## 2023-03-20 NOTE — PROGRESS NOTES
24w2d  Active fetal movement. No contractions, no pain, no bleeding.  1h gtt 179 - recommend 3h test  Hgb 11.8  Still having a rash on her back. rx for hydrocort 2.5% sent  Got a splint for wrist for left but now right hurts. Has another OT appt    Childbirth classes? Info given  Plan on breastfeeding? Yes  Birthcontrol? undecided  Sex on ultrasound? boy  Circumsion? Yes  Peds doc? Undecided    Discussed risk of placental abruption if any trauma - for example fall or MVA - recommend patient come to labor and delivery right away for monitoring.    RTC 4 weeks, sooner PRN.  Rachna Teran MD

## 2023-03-27 ENCOUNTER — TELEPHONE (OUTPATIENT)
Dept: BEHAVIORAL HEALTH | Facility: CLINIC | Age: 28
End: 2023-03-27
Payer: COMMERCIAL

## 2023-03-27 NOTE — TELEPHONE ENCOUNTER
"[facility] [place]  Behavioral Health Clinician Triage Service    MyChart PHQ-9 Responses:  [facility] Follow-up to PHQ 2/3/2023 2/16/2023 3/24/2023   PHQ-9 9. Suicide Ideation past 2 weeks More than half the days Several days Several days   Thoughts of suicide or self harm in past 2 weeks No No No   Thoughts of suicide or self harm in past 2 weeks No No No   PHQ-9 Safety concerns? No No No   PHQ-9 Safety concerns? No No No        1st Outreach Date: March 27, 2023 Time: 11:13am  Outcome: No Answer.  [facility] Pool will make one more phone attempt within 24 hours.    Teresita Noguera [facility] Intern    2nd Outreach Date: March 27, 2023 Time: 3:28pm  Outcome: Left a message.  See disposition below.   Teresita Noguera [facility] Intern    Sent patient a [employer] message at 3:35pm.    3rd Outreach Date: March 28, 2023  Time: 3:38 PM  Outcome: No answer, left VM and sent patient [employer] message.  AMBER Bullock, [facility]    Disposition: This writer ([facility] Jena) has met with patient twice in the past, during which visits safety was addressed. Patient denied any history of active SI, endorsing only occasional \"fleeting\" and unwanted thoughts which she denied any desire to act on. She was open to creating a safety plan at that time, which was made available to patient via [employer]. Based on these assessments, in addition to the indicated decrease in SI since 2/3/2023 and denial of follow-up PHQ-9 questions, it does not appear that a welfare check is indicated.   "

## 2023-03-30 ENCOUNTER — LAB (OUTPATIENT)
Dept: LAB | Facility: CLINIC | Age: 28
End: 2023-03-30
Payer: COMMERCIAL

## 2023-03-30 DIAGNOSIS — Z34.02 ENCOUNTER FOR SUPERVISION OF NORMAL FIRST PREGNANCY IN SECOND TRIMESTER: ICD-10-CM

## 2023-03-30 LAB
GESTATIONAL GTT 1 HR POST DOSE: 157 MG/DL (ref 60–179)
GESTATIONAL GTT 2 HR POST DOSE: 94 MG/DL (ref 60–154)
GESTATIONAL GTT 3 HR POST DOSE: 101 MG/DL (ref 60–139)
GLUCOSE P FAST SERPL-MCNC: 77 MG/DL (ref 60–94)

## 2023-03-30 PROCEDURE — 82952 GTT-ADDED SAMPLES: CPT

## 2023-03-30 PROCEDURE — 36415 COLL VENOUS BLD VENIPUNCTURE: CPT

## 2023-03-30 PROCEDURE — 82951 GLUCOSE TOLERANCE TEST (GTT): CPT

## 2023-04-27 ENCOUNTER — PRENATAL OFFICE VISIT (OUTPATIENT)
Dept: MIDWIFE SERVICES | Facility: CLINIC | Age: 28
End: 2023-04-27
Payer: COMMERCIAL

## 2023-04-27 VITALS
WEIGHT: 234.4 LBS | SYSTOLIC BLOOD PRESSURE: 110 MMHG | HEART RATE: 117 BPM | DIASTOLIC BLOOD PRESSURE: 60 MMHG | TEMPERATURE: 97.9 F | BODY MASS INDEX: 43.22 KG/M2

## 2023-04-27 DIAGNOSIS — Z34.03 ENCOUNTER FOR SUPERVISION OF NORMAL FIRST PREGNANCY, THIRD TRIMESTER: Primary | ICD-10-CM

## 2023-04-27 PROCEDURE — 99207 PR PRENATAL VISIT: CPT | Performed by: ADVANCED PRACTICE MIDWIFE

## 2023-04-27 NOTE — PROGRESS NOTES
29w5d  Mally is here with her partner for prenatal care. Has decided to switch back to midwifery care. She is teary today because of her weight gain (about 40lbs). Discussed trying to eat more fruits/vegetables and decrease sweet snacks, also encourged some increased activity. Baby is very active, denies bleeding, leaking of fluid. Headaches are much improved. Discussed symptoms of  labor. She  Knows how to contact [facility]. Return to care 2 weeks.  Denver Krueger CNM

## 2023-04-28 PROBLEM — M25.532 LEFT WRIST PAIN: Status: RESOLVED | Noted: 2023-03-06 | Resolved: 2023-04-28

## 2023-05-07 ENCOUNTER — HEALTH MAINTENANCE LETTER (OUTPATIENT)
Age: 28
End: 2023-05-07

## 2023-05-09 ENCOUNTER — PRENATAL OFFICE VISIT (OUTPATIENT)
Dept: MIDWIFE SERVICES | Facility: CLINIC | Age: 28
End: 2023-05-09
Payer: COMMERCIAL

## 2023-05-09 VITALS
HEART RATE: 95 BPM | SYSTOLIC BLOOD PRESSURE: 106 MMHG | DIASTOLIC BLOOD PRESSURE: 69 MMHG | WEIGHT: 238.2 LBS | BODY MASS INDEX: 43.92 KG/M2

## 2023-05-09 DIAGNOSIS — Z34.03 ENCOUNTER FOR SUPERVISION OF NORMAL FIRST PREGNANCY, THIRD TRIMESTER: Primary | ICD-10-CM

## 2023-05-09 PROCEDURE — 99207 PR PRENATAL VISIT: CPT | Performed by: ADVANCED PRACTICE MIDWIFE

## 2023-05-09 RX ORDER — VITAMIN A ACETATE, .BETA.-CAROTENE, ASCORBIC ACID, CHOLECALCIFEROL, .ALPHA.-TOCOPHEROL ACETATE, DL-, THIAMINE MONONITRATE, RIBOFLAVIN, NIACINAMIDE, PYRIDOXINE HYDROCHLORIDE, FOLIC ACID, CYANOCOBALAMIN, CALCIUM CARBONATE, FERROUS FUMARATE, ZINC OXIDE, AND CUPRIC OXIDE 2000; 2000; 120; 400; 22; 1.84; 3; 20; 10; 1; 12; 200; 27; 25; 2 [IU]/1; [IU]/1; MG/1; [IU]/1; MG/1; MG/1; MG/1; MG/1; MG/1; MG/1; UG/1; MG/1; MG/1; MG/1; MG/1
1 TABLET ORAL DAILY
COMMUNITY
End: 2023-08-18

## 2023-05-09 NOTE — PROGRESS NOTES
31w3d  Mally is here with [employer] for prenatal care. She is feeling well, baby is active.  Denies bleeding, leaking of fluid, headaches, vision changes, edema. Discussed pain options in labor. Measuring S>D today, Mally has growth U/S scheduled for next week. Knows how to contact [facility].  Denver Krueger CNM

## 2023-05-15 ENCOUNTER — HOSPITAL ENCOUNTER (OUTPATIENT)
Dept: ULTRASOUND IMAGING | Facility: CLINIC | Age: 28
Discharge: HOME | End: 2023-05-15
Attending: OBSTETRICS & GYNECOLOGY
Payer: COMMERCIAL

## 2023-05-15 ENCOUNTER — OFFICE VISIT (OUTPATIENT)
Dept: MATERNAL FETAL MEDICINE | Facility: CLINIC | Age: 28
End: 2023-05-15
Attending: OBSTETRICS & GYNECOLOGY
Payer: COMMERCIAL

## 2023-05-15 DIAGNOSIS — O36.63X0 EXCESSIVE FETAL GROWTH AFFECTING MANAGEMENT OF PREGNANCY IN THIRD TRIMESTER, SINGLE OR UNSPECIFIED FETUS: ICD-10-CM

## 2023-05-15 DIAGNOSIS — O26.90 PREGNANCY RELATED CONDITION, ANTEPARTUM: ICD-10-CM

## 2023-05-15 DIAGNOSIS — O99.212 OBESITY AFFECTING PREGNANCY IN SECOND TRIMESTER: Primary | ICD-10-CM

## 2023-05-15 PROCEDURE — 76816 OB US FOLLOW-UP PER FETUS: CPT

## 2023-05-15 PROCEDURE — 76816 OB US FOLLOW-UP PER FETUS: CPT | Mod: 26 | Performed by: OBSTETRICS & GYNECOLOGY

## 2023-05-15 PROCEDURE — 99213 OFFICE O/P EST LOW 20 MIN: CPT | Mod: 25 | Performed by: OBSTETRICS & GYNECOLOGY

## 2023-05-15 NOTE — PROGRESS NOTES
"Please see \"Imaging\" tab under \"Chart Review\" for details of today's visit.    Pavan Chacon    "

## 2023-05-15 NOTE — NURSING NOTE
Patient reports positive fetal movement, no pain, no contractions, leaking of fluid, or bleeding. SBAR given to RIGOBERTO SMITH, see their note in Epic.

## 2023-05-17 ENCOUNTER — THERAPY VISIT (OUTPATIENT)
Dept: OCCUPATIONAL THERAPY | Facility: CLINIC | Age: 28
End: 2023-05-17
Payer: COMMERCIAL

## 2023-05-17 DIAGNOSIS — M25.531 PAIN IN BOTH WRISTS: ICD-10-CM

## 2023-05-17 DIAGNOSIS — M25.532 PAIN IN BOTH WRISTS: ICD-10-CM

## 2023-05-17 PROCEDURE — 97535 SELF CARE MNGMENT TRAINING: CPT | Mod: GO | Performed by: OCCUPATIONAL THERAPIST

## 2023-05-17 PROCEDURE — 97760 ORTHOTIC MGMT&TRAING 1ST ENC: CPT | Mod: GO | Performed by: OCCUPATIONAL THERAPIST

## 2023-05-17 PROCEDURE — 97110 THERAPEUTIC EXERCISES: CPT | Mod: GO | Performed by: OCCUPATIONAL THERAPIST

## 2023-05-17 PROCEDURE — 97165 OT EVAL LOW COMPLEX 30 MIN: CPT | Mod: GO | Performed by: OCCUPATIONAL THERAPIST

## 2023-05-17 NOTE — PROGRESS NOTES
"OCCUPATIONAL THERAPY EVALUATION  Type of Visit: Evaluation    See electronic medical record for Abuse and Falls Screening details.    Subjective      Presenting condition or subjective complaint: wrist pain  Date of onset: 02/01/23    Relevant medical history: Overweight   Dates & types of surgery: 2017 gallbladder remove    Prior diagnostic imaging/testing results:       Prior therapy history for the same diagnosis, illness or injury: Yes [place] [facility] [place] only    Prior Level of Function   Ambulation: Independent  ADL: Independent      Living Environment  Social support: With a significant other or spouse   Type of home: Apartment/condo   Stairs to enter the home:         Ramp:     Stairs inside the home:         Help at home: None  Equipment owned:       Employment: Yes   Hobbies/Interests: walk dog    Patient goals for therapy: work tasks/computer use and mouse, also other daily activities    Objective:  Right hand dominant  Patient reports symptoms of pain, stiffness/loss of motion and weakness/loss of strength    Pain Level (Scale 0-10):   5/17/2023   At Rest 5 right  0 left   With Use 10 right  5 left     Pain Description:  Date 5/17/2023   Location wrist, thumb and forearms   Pain Quality Aching and Burning   Frequency constant     Pain is worst  daytime and nighttime   Exacerbated by  writing, mousing, typing   Relieved by Icing, Massage, left wrist splint   Progression Left improved  Right much worse     Sensation   WNL throughout all nerve distributions; per patient report    Edema   - none  + mild    ++ moderate    +++ severe    5/17/2023   1st DC R:+  L:-   Radial Styloid R:+  L:+      ROM  Pain Report: - none  + mild    ++ moderate    +++ severe   Reports \"popping\" with active thumb ext, pain with AROM all planes B thumbs and wrists  Thumb 5/17/2023 5/17/2023   AROM (PROM) Right Left   MP /35 /55+   IP /25 /55+   RABD 35 25+   PABD 40 35+   Opposition 6/10 9/10+ "     Wrist 5/17/2023 5/17/2023   AROM (PROM) Right Left   Extension 55+ 65+   Flexion 30+ 50+   RD 15+ 10+   UD 15++ 30+   UD with Th Flex -10+++ 5++     Resisted Testing  Pain Report: - none  + mild    ++ moderate    +++ severe    5/17/2023   APL R:+++  L:++   EPB R:++  L:+ slight   EPL R:+++  L:++   FCR R:-  L:+ slight     Strength   (Measured in pounds)  Pain Report: - none  + mild    ++ moderate    +++ severe    5/17/2023 5/17/2023   Trials Right Left   1 8++ 32-     Lat Pinch 5/17/2023 5/17/2023   Trials Right Left   1 7+ 11+ click     3 Pt Pinch 5/17/2023 5/17/2023   Trials Right Left   1 3++ 5+     Special Tests   Pain Report:  - none    + mild    ++ moderate    +++ severe    5/17/2023   Finkelsteins R:+++  L:++   WHAT test R:NT due to pain  L:NT due to pain     Palpation  Pain Report:  - none    + mild    ++ moderate    +++ severe    5/17/2023   Radial Styloid R:+++  L:++   1st DC R:++  L:+   FCR R:-  L:-   Thumb CMC R:-  L:-   PIN Site R:+  L:+ slight   Extensor Wad R:+  L:-     Assessment & Plan   CLINICAL IMPRESSIONS   Medical Diagnosis: B wrist pain    Treatment Diagnosis: B wrist pain    Impression/Assessment: Pt is a 27 year old female presenting to Occupational Therapy due to bilateral wrist pain.  The following significant findings have been identified: Impaired ROM, Impaired strength and Pain.  These identified deficits interfere with their ability to perform self care tasks, work tasks, recreational activities, household chores, driving  and meal planning and preparation as compared to previous level of function.   Patient's limitations or Problem List includes: Pain, Decreased ROM/motion, Increased edema, Weakness, Decreased , Decreased pinch and Tightness in musculature of the bilateral wrist and thumb which interferes with the patient's ability to perform Self Care Tasks (dressing, eating, bathing), Work Tasks, Sleep Patterns, Recreational Activities, Household Chores and Driving  as  compared to previous level of function.    Clinical Decision Making (Complexity):   Assessment of Occupational Performance: 5 or more Performance Deficits  Occupational Performance Limitations: bathing/showering, dressing, hygiene and grooming, driving and community mobility, home establishment and management, meal preparation and cleanup, sleep, work and leisure activities  Clinical Decision Making (Complexity): Low complexity    PLAN OF CARE  Treatment Interventions:   Modalities:  US  Therapeutic Exercise:  AROM, PROM, Tendon Gliding and Isotonics  Neuromuscular re-education:  Posture and Kinesiotaping  Manual Techniques:  Friction massage and Myofascial release  Orthotic Fabrication:  Static Forearm based  Self Care:  Self Care Tasks, Ergonomic Considerations, Diagnostic Education and Work Tasks    Long Term Goals   OT Goal 1  Goal Identifier: decrease pain  Goal Description: Pt will decrease pain to be able to type and mouse with pain 3/10 or less  Rationale:  (to maximize independence with work tasks)  Target Date: 07/15/23      Frequency of Treatment: 1x/week  Duration of Treatment: 8 weeks     Recommended Referrals to Other Professionals: none  Education Assessment: Learner/Method: Listening;Reading;Demonstration;Pictures/Video;Patient  Education Comments: PTRx set up on phone     Risks and benefits of evaluation/treatment have been explained.   Patient/Family/caregiver agrees with Plan of Care.     Evaluation Time:    OT Eval, Low Complexity Minutes (14345): 20    Signing Clinician: Ashlyn Sommers OT

## 2023-05-24 ENCOUNTER — PRENATAL OFFICE VISIT (OUTPATIENT)
Dept: MIDWIFE SERVICES | Facility: CLINIC | Age: 28
End: 2023-05-24
Payer: COMMERCIAL

## 2023-05-24 VITALS
HEART RATE: 115 BPM | BODY MASS INDEX: 44.62 KG/M2 | WEIGHT: 242 LBS | DIASTOLIC BLOOD PRESSURE: 72 MMHG | SYSTOLIC BLOOD PRESSURE: 118 MMHG | OXYGEN SATURATION: 97 %

## 2023-05-24 DIAGNOSIS — M25.531 RIGHT WRIST PAIN: ICD-10-CM

## 2023-05-24 DIAGNOSIS — Z34.03 ENCOUNTER FOR SUPERVISION OF NORMAL FIRST PREGNANCY, THIRD TRIMESTER: Primary | ICD-10-CM

## 2023-05-24 DIAGNOSIS — R10.2 PELVIC PAIN AFFECTING PREGNANCY IN THIRD TRIMESTER, ANTEPARTUM: ICD-10-CM

## 2023-05-24 DIAGNOSIS — O26.893 PELVIC PAIN AFFECTING PREGNANCY IN THIRD TRIMESTER, ANTEPARTUM: ICD-10-CM

## 2023-05-24 PROCEDURE — 99207 PR PRENATAL VISIT: CPT | Performed by: ADVANCED PRACTICE MIDWIFE

## 2023-05-24 ASSESSMENT — PATIENT HEALTH QUESTIONNAIRE - PHQ9: SUM OF ALL RESPONSES TO PHQ QUESTIONS 1-9: 24

## 2023-05-24 NOTE — PROGRESS NOTES
"33w4d  Mally is here by herself today. She is very upset, crying and states she is \"not doing well\". PHQ-9 score is 24, denies suicidality/thoughts of self harm. Her partner was sent to halfway last Thursday. She is overwhelmed, lonely, anxious, not functioning well.  She is scared that she is going to have to do all of this (birthing/parenting) alone. She is estranged from her family, and feels she has no support. She is agreeable to talking with Jena [facility]. Jena is here and able to see her now.    Baby is active. Denies bleeding, leaking of fluid. Some headaches due to crying. No new swelling. No contractions, bleeding, leaking of fluid. Had MFM U/S last week, and baby is measuring s>d, EFW 98% and AC >99%. MFM planning recheck in 3 weeks. Discussed [employer] for labor support, and Postpartum and Pregnancy Support [place] services. Fitted for pregnancy support belt due to ongoing pelvic/low back pain. Spent a significant amount of time discussing resources, maternity leave, and accessing benefits.     Denver Krueger CNM    "

## 2023-05-26 ENCOUNTER — DOCUMENTATION ONLY (OUTPATIENT)
Dept: BEHAVIORAL HEALTH | Facility: CLINIC | Age: 28
End: 2023-05-26
Payer: COMMERCIAL

## 2023-05-26 NOTE — PROGRESS NOTES
5/24    Warm handoff from patient's CNM to assess MH symptoms, provide emotional support, and discuss coping/additional support resources in the context of recent stressors. C will reach out to  care coordination, assist patient in establishing consistent therapy. [facility] will call patient next week to check in.

## 2023-05-31 ENCOUNTER — PATIENT OUTREACH (OUTPATIENT)
Dept: CARE COORDINATION | Facility: CLINIC | Age: 28
End: 2023-05-31
Payer: COMMERCIAL

## 2023-05-31 NOTE — PROGRESS NOTES
Clinic Care Coordination Contact  [facility]/Voicemail       Clinical Data: Care Coordinator Outreach  Outreach attempted x 1.  Left message on patient's voicemail with call back information and requested return call.  Plan: Care Coordinator sent care coordination introduction letter on 5/31/23 via [employer]. Care Coordinator will try to reach patient again in 1-2 business days.    Xiomara Scales  [facility]  Clinic Care Coordinator  [facility]  [phone]  [email or web address]

## 2023-05-31 NOTE — LETTER
[facility] CARE COORDINATION    May 31, 2023    Mally Evans  [address]  [place] [address]      Dear Mally,        I am a  clinic care coordinator who works with Physician Deja Beltran with the [facility]. I wanted to introduce myself and provide you with my contact information for you to be able to call me with any questions or concerns. Below is a description of clinic care coordination and how I can further assist you.       The clinic care coordination team is made up of a registered nurse, , financial resource worker and community health worker who understand the health care system. The goal of clinic care coordination is to help you manage your health and improve access to the health care system. Our team works alongside your provider to assist you in determining your health and social needs. We can help you obtain health care and community resources, providing you with necessary information and education. We can work with you through any barriers and develop a care plan that helps coordinate and strengthen the communication between you and your care team.  Our services are voluntary and are offered without charge to you personally.    Please feel free to contact me with any questions or concerns regarding care coordination and what we can offer.      We are focused on providing you with the highest-quality healthcare experience possible.    Sincerely,     Xiomara Scales, [facility]  Clinic Care Coordinator  [facility]  [phone]

## 2023-05-31 NOTE — LETTER
[facility] CARE COORDINATION    June 6, 2023    Mally Evans  [address]  [facility] [address]      Dear Mally,    I have been attempting to reach you. I would appreciate if you would give me a call at [phone] to let me know if you would like to continue working together. I know that there are many things that can affect our ability to communicate and I hope we can continue to work together.    All of us at the [facility] are invested in your health and are here to assist you in meeting your goals.     Sincerely,    Crystal Ayala, [facility]  [phone]

## 2023-05-31 NOTE — LETTER
Mally,   This is Xiomara, I am a Social Work Care Coordinator with [employer].  I wanted to make sure you had some community resource information that might be useful.  It looks like you are getting close to the end of pregnancy!  I specifically wanted to mention the agency [employer]. [employer] offers lots of support for women and children, and can assist with baby supplies, pregnancy supplies, and even financial support at times.    [email or web address]    The list below is really long, and I hope there is some useful information for you.     Please reach out if I can be of any assistance to you.       -Xiomara Scales, [facility]  Clinic Care Coordinator  [facility]  [phone]

## 2023-06-02 NOTE — PROGRESS NOTES
Clinic Care Coordination Contact  [facility]/Voicemail       Clinical Data: Care Coordinator Outreach  Outreach attempted x 2.  Phone rang and then hung up, no voicemail prompts.  Plan: Care Coordinator will try to reach patient again in 1-2 business days.  SW sent pt a list of resources via [employer], and suggests that [employer] would be a good resource for pt to explore as it is near her home and has many wonderful services.      Xiomara Scales,  [facility]  Clinic Care Coordinator  [facility]  [phone]  [email or web address]

## 2023-06-06 NOTE — PROGRESS NOTES
Clinic Care Coordination Contact  [facility]/Voicemail       Clinical Data: Care Coordinator Outreach  Outreach attempted x 3.  Left message on patient's voicemail with call back information and requested return call.  Plan: Care Coordinator will send unable to contact letter with care coordinator contact information via [employer]. Care Coordinator will do no further outreaches at this time.      Xiomara Scales  [facility]  Clinic Care Coordinator  [facility]  [phone]  [email or web address]

## 2023-06-08 NOTE — PATIENT INSTRUCTIONS
Birth Control Methods  Birth control methods are used to help prevent pregnancy. There are many different methods to choose from. Talk with your healthcare provider about which method is right for you. Be sure to ask your provider how well each one works. Also ask about the benefits, risks, and side effects of each method.   Hormones  Some birth control methods work by releasing hormones such as progestin and estrogen. These methods include hormone implants, hormone shots, the vaginal ring, the patch, and birth control pills. They all work by stopping the release of the egg from the ovary (ovulation). All of these methods work well and can be stopped at any time.     The implant is a small device that needs to be placed in the upper arm by a trained healthcare provider. It works for up to 3 years.    Hormone injections must be repeated every 3 months.    The vaginal ring must be replaced monthly. It can be removed during the fourth week of each cycle.    The patch must be replaced weekly. It's not worn during the fourth week of each cycle.    Birth control pills must be taken every day.  Intrauterine device (IUD)  An IUD is a small, T-shaped device. It must be placed in the uterus by a trained healthcare provider. There are different types of IUDs available. They work by causing changes in the uterus that make it harder for sperm to reach the egg. Depending on the type of IUD you have, it may work for several years or longer. The IUD is a reversible birth control method. This means it can be removed at any time.   Condom  A condom is a sheath that forms a thin barrier between the penis and the vagina. It helps prevent pregnancy by keeping sperm from entering the vagina. When latex condoms are used, they have the added benefit of protecting against most STIs (sexually transmitted infections). Condoms are used each time there is sexual intercourse and should be discarded after each use. Ask your healthcare  provider about the different types of condoms available. These include both the male condom and female condom.   Spermicide  Spermicides come as foams, jellies, creams, suppositories, and tablets.  They help prevent pregnancy by killing sperm. When used alone they are not that reliable. They work best when combined with other birth control methods such as diaphragms and cervical caps.   Sponge, diaphragm, and cervical cap   All of these methods help prevent pregnancy by covering the opening of the uterus (cervix). This prevents sperm from passing through.   The sponge contains spermicide. It can be bought over the counter. The sponge must be left in place for at least 6 hours after the last time you have sex. However, it should not stay in place for more than 24 hours. Discard after use.   The diaphragm and cervical cap must be fitted and prescribed by your healthcare provider. Both are used with spermicide. The diaphragm must be left in place for at least 6 hours after sex. However, it should not stay in place for more than 24 hours. It can be washed and reused. The cervical cap must be left in place for at least 6 hours after sex. However, it should not stay in place for more than 48 hours. It can be washed and reused.   Withdrawal method  This is when the man pulls his penis out of the vagina just before ejaculation ( coming ). This lowers the amount of sperm entering the vagina. Be aware that fluids released just before ejaculation often still contain some sperm, so this method is not as reliable as certain other methods.   Rhythm method   This method is also call natural family planning or fertility awareness. It requires that you know when in your menstrual cycle you are likely to become pregnant. Then you not have sex during those days. This requires careful planning and good discipline. Your healthcare provider can explain more about how this works.   Tubal ligation and vasectomy  These are surgical methods  to prevent pregnancy. Tubal ligation is an option for women. The fallopian tubes are blocked or cut (ligated). This keeps the egg from passing into the uterus or sperm from reaching the egg. Vasectomy is an option for men. The tubes that normally carry sperm to the penis are either closed or blocked. Both tubal ligation and vasectomy are permanent birth control methods. This means reversal is either not possible or unlikely to work. They are good choices for women and men who know that they don't want to have children in the future.   [employer] last reviewed this educational content on 2020-2022 [employer]. All rights reserved. This information is not intended as a substitute for professional medical care. Always follow your healthcare professional's instructions.          Understanding  Labor  Going into labor before week 37 of pregnancy is called  labor.  labor can cause your baby to be born too soon. This can lead to health problems for your baby.     Before labor, the cervix is thick and closed.      In  labor, the cervix begins to efface (thin) and dilate (open).     Symptoms of  labor  If you think you re having  labor, get medical help right away. Contractions alone don t mean you re in  labor. What matters more are changes in your cervix. The cervix is the opening at the lower end of the uterus. Symptoms of  labor include:    4 or more contractions per hour    Strong contractions    Constant menstrual-like cramping    Low-back pain    Mucous or bloody fluid from the vagina    Bleeding or spotting in the second or third trimester  Evaluating  labor  Your healthcare provider will try to find out if you re in  labor or just having contractions. They may watch you for a few hours. You may have these tests:    Pelvic exam. This is to see if your cervix has effaced (thinned) and dilated (opened).    Uterine activity  monitoring. This is used to detect contractions.    Fetal monitoring. This is done to check the health of your baby.    Ultrasound. This test looks at your baby s size and position.    Amniocentesis. This test checks how mature your baby s lungs are.  Caring for yourself at home  If you have  contractions, but your cervix is still thick and closed, your healthcare provider may tell you to:    Drink plenty of water.    Do fewer activities.    Rest in bed on your side.    Don't have intercourse or stimulate your nipples.  When to call your healthcare provider  Call your healthcare provider if you have any of these:    4 or more contractions per hour    Bag of water breaks    Bleeding or spotting  If you need hospital care   labor often means that you need hospital care. You may need complete bed rest. You may have an IV (intravenous) line in your arm or hand. This is to give you fluids. You may be given pills or injections. These are done to help prevent contractions. You may get a medicine called a corticosteroid. This is to help your baby s lungs mature more quickly.  Are you at risk?  Any pregnant woman can have  labor. It may start for no reason. But these risk factors can increase your chances:    Past  labor or early birth    Smoking, drug, or alcohol use in pregnancy    A multiple pregnancy (twins or more)    Problems with the shape of the uterus    Bleeding during the pregnancy  The dangers of  birth  A baby born too soon may have health problems. This is because the baby didn t have enough time to grow. Some of the risks for your baby include:    Not breastfeeding or feeding well    Having immature lungs    Bleeding in the brain    Death  Reaching term  Your goal is to get as close to term (week 37 or later) as you can before giving birth. The closer you get to term, the higher your chance of having a healthy baby. Work with your healthcare provider. Together, you can take  steps that may keep you from giving birth too early.  [employer] last reviewed this educational content on 10/1/2021    5592-8966 [employer]. All rights reserved. This information is not intended as a substitute for professional medical care. Always follow your healthcare professional's instructions.          Adapting to Pregnancy: Third Trimester  Although common during pregnancy, some discomforts may seem worse in the final weeks. Simple lifestyle changes can help. Take care of yourself. And ask your partner to help out with small tasks.   Limiting leg problems  Ways to combat leg issues:    Wear support hose all day.    Don't wear snug shoes or clothes that bind, such as tight pants and socks with elastic tops.    Sit with your feet and legs raised often.  Caring for your breasts  Tips to follow include:    Wash with plain water. Don't use harsh soaps or rubbing alcohol. They may cause dryness.    Wear a nursing bra for extra support. It can also hide any leaks from your nipples.  Controlling hemorrhoids  Ways to prevent hemorrhoids include:     Eat foods that are high in fiber. Also exercise and drink enough fluids. This will reduce constipation and hemorrhoids.    Sleep and nap on your side. This limits pressure on the veins of your rectum.    Try not to stand or sit for long periods.  Controlling back pain  As your body changes during pregnancy, your back must work in new ways. Back pain has many causes. Physical changes in your body can strain your back and its supporting muscles. Also hormones increase during pregnancy. This can affect how your muscles and joints work together. All of these changes can lead to pain.   Pain may be felt in the upper or lower back. Pain is also common in the pelvis. Some pregnant women have sciatica. This is pain caused by pressure on the sciatic nerve running down the back of the leg. Ice or heat may help. Your provider may advise massage therapy or a  chiropractor. Sleep on your left side with a pillow between your knees. Use a brace or support device. Ask your provider for specific tips and exercises to help control your back pain.   Tips to help you rest  Good rest and sleep will help you feel better. Here are some ideas:     Ask your partner to massage your shoulders, neck, or back.    Limit the errands you do each day.    Lie down in the afternoon or after work for a few minutes.    Take a warm bath before you go to sleep.    Drink warm milk or teas without caffeine.    Don't drink coffee, black tea, and cola.  Stopping heartburn    Don't eat spicy, greasy, fried, or acidic foods.    Eat small amounts more often. Eat slowly.   Wait 2 hours after eating before lying down.    Sleep with your upper body raised 6 inches.   Managing mood swings  Ways to manage mood swings include:     Know that mood changes are normal.    Exercise often, but get plenty of rest.    Address any concerns and limit stress. Talking to your partner, other women, or your healthcare provider may help.   Dealing with urinary frequency  Tips to deal with having to urinate often include:     Drink plenty of water all day. But if you drink a lot in the evening, you may have to get up more in the night.    Limit coffee, black tea, and cola.  How daily issues affect your health  Many things in your daily life impact your health. This can include transportation, money problems, housing, access to food, and . If you can t get to medical appointments, you may not receive the care you need. When money is tight, it may be difficult to pay for medicines. And living far from a grocery store can make it hard to buy healthy food.   If you have concerns in any of these or other areas, talk with your healthcare team. They may know of local resources to assist you. Or they may have a staff person who can help.   [employer] last reviewed this educational content on 7/1/2021 2000-2022 [employer]. All rights reserved. This information is not intended as a substitute for professional medical care. Always follow your healthcare professional's instructions.        You have been provided the Any Day Now: Early Labor at Home document.    Additional copies can be found here:  [email or web address]  You have been provided the What I'd Wish I'd Known About Giving Birth document.    Additional copies can be found here:  [email or web address]    The Benefits of Breastmilk  Breastmilk is the best food for your baby. It has just the right amount of nutrients. It protects your baby's digestive system. It protects other body systems in your baby. And it helps them grow and develop.       Healthiest for baby  Breastmilk is the ideal food for babies. It has all the nutrients your baby needs to grow healthy and strong.    Breastmilk has these benefits:    It lowers the risk for sudden infant death syndrome (SIDS).    It gives babies a lower risk for ear infections in their first year. This is compared to babies who are fed formula.    It has DHA. This is a type of fat. It helps your baby s growing brain, nervous system, and eyes.    It is full of antibodies. These help your baby fight infection.    It lowers your baby's risk for lung illness. It lowers their risk of diarrhea.    It lowers your baby s risk for allergies. Babies fed formula are more likely to have an allergy to cow's milk.    It lowers your baby s risk for colds and many other diseases.    It changes as your baby grows. This meets your baby's changing needs.  And it s important to know that:    Giving only breastmilk for the first 6 months gives your baby more of these benefits.    Giving breastmilk plus solid food from 6 months to 1 year or more gives more benefits.     babies have fewer long-term health problems when they grow up. These problems include diabetes and obesity.    Breastfeeding gives contact that your baby loves.  Spending time skin-to-skin with you is calming and comforting.  Healthiest for mom  For many people, breastfeeding is a good experience. It creates a strong bond between mother and baby. People who breastfeed also get health benefits. Some benefits for you include:     You can know that your baby is growing healthy and strong because of your milk.    Breastmilk is convenient. It's free and clean. It's always at the right temperature.    Breastfeeding burns calories. This can help you lose pregnancy weight faster.    Breastfeeding releases hormones that contract the uterus. This helps the uterus return to its normal size after childbirth.    Mothers who breastfeed have a lower risk for ovarian and breast cancers.    Some studies have found that breastfeeding may reduce a person's risk for type 2 diabetes and rheumatoid arthritis. It may reduce the risk of cardiovascular disease. This includes high blood pressure and high cholesterol.    Breastfeeding every day delays the return of your menstrual period. This can help extend the time between pregnancies.  Many people can help you learn to breastfeed. A lactation consultant can help. This is a healthcare provider who is trained to help you breastfeed. Your nurse, midwife, nurse practitioner, obstetrician, pediatrician, or family practice doctor can also help you learn about breastfeeding.   What does it mean to give only breastmilk?   Giving only breastmilk for at least the first 6 months of life is best for your baby. Feeding your baby from your breasts is best. If you need to be away from your baby, you can express breastmilk. This means pumping milk from your breast into a container. Talk with your healthcare provider about the best ways to feed this milk to your baby.    You should not give your baby water, sugar water, formula, or solids during their first 6 months unless your baby's healthcare provider tells you to.   Your baby s provider may tell you to give  your baby vitamins, minerals, or medicines.  babies should be given vitamin D supplements. The provider will tell you the type and amount of vitamin D to give your baby.   What are the risks of not giving only breastmilk?   You now know the many of the benefits of breastfeeding. But you might not know why it's important to give only breastmilk for at least 6 months.   Your baby gets the best protection against health problems when they get only breastmilk. Breastfeeding some of the time is good. But breastfeeding all of the time is best.   Giving your baby formula or other liquids may cause you to:    Have more problems breastfeeding    Make less milk    Be less confident in breastfeeding    Breastfeed less often    Stop breastfeeding before your baby is at least 12 months old                                                     When other options may be needed  Giving only breastmilk is almost always the best thing to do. But your healthcare provider may have reasons to advise giving your baby formula or other liquids. They include:     Your baby has a health problem. There are cases where you may need to add formula or other liquids. This is often only for a short time. This may be the case if your baby has low blood sugar (hypoglycemia), loses body fluids (dehydration), or has high levels of bilirubin.    You have certain health problems. Some infections can be passed from your skin to your baby's skin. Or it can pass through your breastmilk. People with HIV/AIDS or untreated and contagious TB (tuberculosis) should not breastfeed. Women with active skin sores from chickenpox (varicella) can pump their breastmilk and feed their baby. But they should keep their baby s skin from touching any of the sores.    You use illegal drugs or drink alcohol. People who use illegal drugs should not breastfeed. If you are going to have a drink that has alcohol, it's best to do so just after you nurse or pump milk.  Breastfeeding or pumping breast milk is OK at least 4 hours after your last drink. That way, your body will have some time to get rid of the alcohol before the next feeding. Less of it will reach your baby. Long-term exposure to alcohol in breastmilk may affect your baby's health. It may also cause you to make less milk.    You take certain medicines. If you take any medicines, ask your baby s healthcare provider if you can breastfeed.  [employer] last reviewed this educational content on 2020-2022 [employer]. All rights reserved. This information is not intended as a substitute for professional medical care. Always follow your healthcare professional's instructions.          What Is Group B Strep?  Group B strep (streptococcus) is a common type of bacteria. It can grow in a woman s vagina, rectum, or urinary tract. It most often does not cause harm in adults. But in rare cases, a woman who has group B strep can infect her baby during the birth. This can cause serious illness in the . But treating the mother during labor reduces the risk of the baby becoming infected. And if a  gets group B strep, the infection can be treated.     Facts about group B strep   Learning more about group B strep can help you understand how testing and treatment can help. Here are some basic facts about group B strep:     It is not a sexually transmitted disease.    It is not the same as strep throat. (That is caused by group A strep.)    It often has no symptoms. It may cause no problems in adults.    Test results can be misleading. They may be negative one week and positive the next week.    Group B strep can be spread to the baby during vaginal delivery. It cannot be passed during  (surgical) birth.    A mother with group B strep rarely infects her . (Infection happens only about 1% to 2% of the time.)    When a mother is treated during labor and delivery, her baby almost never  becomes infected.    Certain factors during pregnancy increase the risk of a baby becoming infected.  Possible effects on your baby   Group B strep can infect the blood. It can also cause inflammation of the baby s lungs, brain, or spinal cord. Long-term effects can include blindness, deafness, mental retardation, or cerebral palsy. And in rare cases, infection causes death. Infection is most often found soon after the baby is born.   How your baby may become infected   Group B strep often lives in the vagina or rectum. If the amniotic sac breaks early, bacteria from the vagina can travel to the uterus, reaching the baby. Or, as the baby passes through the birth canal, it can come in contact with the bacteria. In rare cases, group B strep can be passed to the baby after delivery. This is called late-onset group B strep. The source of this type of infection is not well understood. But some experts believe that it happens if the baby is exposed to group B strep in the home, from the parents or siblings, or in the community.   What increases the risk?   Certain risk factors increase the chance that a baby will be infected. They include:     Breaking or leaking of the amniotic sac before 37 weeks of pregnancy    Labor before 37 weeks of pregnancy    Breaking of the amniotic sac more than 18 hours before labor starts    Fever during labor    A urinary tract infection with group B strep at any point in the pregnancy    Having a previous baby born with a group B strep infection  [employer] last reviewed this educational content on 2020-2022 [employer]. All rights reserved. This information is not intended as a substitute for professional medical care. Always follow your healthcare professional's instructions.          Vitamin K Deficiency Bleeding (VKDB) in a  Baby  What is vitamin K deficiency bleeding in a ?  Vitamin K deficiency bleeding (VKDB) is a problem that occurs in some   babies. It most often happens during the first few days and weeks of life. But it can occur up to 6 months of age. This condition used to be called hemorrhagic disease of the .    What causes vitamin K deficiency bleeding in a ?  Babies are normally born with low levels of vitamin K. Vitamin K is needed for blood to clot. Not having enough vitamin K is the main cause of vitamin deficiency bleeding. If your baby s blood doesn t clot, they may have severe bleeding or a hemorrhage. This can be life-threatening. The cause of vitamin K deficiency depends on the 3 types of VKDB:     Early VKDB. This can occur right after birth or up to 24 hours of age. It's caused by certain medicines the mother took during pregnancy    Classical VKDB. This occurs from 1 to 7 days after birth. It's caused by low levels of vitamin K found in newborns.    Late VKDB. This most often occurs up to 3 months after birth. But it can occur up to 6 months after birth. It can occur in a baby who did not get a vitamin K shot at birth and who was  only.    Which newborns are at risk for vitamin K deficiency bleeding?   These things may make it more likely for a baby to have this condition:     Not getting a vitamin K shot at birth.The American Academy of Pediatrics recommends that all newborns get a vitamin K shot. This can prevent severe bleeding.     Being  only and not getting a vitamin K shot at birth.  Breastmilk has less vitamin K than formula made with cow s milk. The vitamin K shot will provide what a  baby needs. A mother who takes a vitamin K supplement while breastfeeding will not raise her baby's vitamin K level.    Being born to a mother who took certain medicines during pregnancy.  These include medicines for seizures (anticonvulsants) and medicines for blood-clotting problems (anticoagulants).  What are the symptoms of vitamin K deficiency bleeding in a ?   Symptoms can occur a bit  differently in each child. They can include:     Blood in your baby's stool that make it black and sticky (tarry)    Blood in your baby's urine    Oozing of blood from around your baby s umbilical cord or circumcision site    Bruising more easily than normal. This may happen around your baby's head and face.    Beingvery sleepy or fussy. In severe cases, vitamin K deficiency may cause bleeding in and around the brain. Other signs of bleeding in the brain can include seizures or vomiting, not just spitting up.  The symptoms of this condition may be similar to symptoms of other health issues. Make sure your child sees a healthcare provider for a diagnosis.   How is vitamin K deficiency bleeding in a  diagnosed?   The healthcare provider will look at your baby's health history. They will also check your baby for signs of bleeding. Your baby may need lab tests to measure their blood clotting times. The results of these tests can help your child s healthcare provider make the diagnosis.   How is vitamin K deficiency bleeding in a  treated?   Treatment will depend on your child s symptoms, age, and general health. It will also depend on how severe the condition is.   Your baby will probably get a vitamin K shot.   Your baby may need a blood transfusion if they have severe bleeding. If your baby is severely ill, they may need to be treated in the intensive care unit (ICU).   What are possible complications of vitamin K deficiency bleeding in a ?   Vitamin K deficiency bleeding can lead to life-threatening problems. These include dangerous bleeding that can lead to brain damage or death. In the [place] deaths and long-term complications from vitamin K deficiency have been greatly lowered because of vitamin K shots given at birth. But bleeding into the brain, central nervous system, stomach, intestines, or other parts of the body can cause serious problems, or even death.   Can vitamin K deficiency  bleeding in a  be prevented?   This condition can be prevented. The American Academy of Pediatrics recommends that all newborns get a vitamin K shot. Your child will get a shot into their upper leg (thigh) muscle. This shot will be given soon after birth. This will prevent dangerous bleeding.   Key points about vitamin K deficiency bleeding in a      Vitamin K deficiency bleeding is a problem that occurs in some newborns. It often happens during the first few days of life.    Babies are normally born with low levels of vitamin K. Not having enough vitamin K is the main cause of this condition.    Your child s healthcare provider will diagnose this condition. This will be based on your child s signs of bleeding and lab tests for blood clotting times.    The American Academy of Pediatrics recommends that all newborns get a vitamin K shot. This can prevent this condition.     Next steps  Tips to help you get the most from a visit to your child s healthcare provider:     Know the reason for the visit and what you want to happen.    Before your visit, write down questions you want answered.    At the visit, write down the name of a new diagnosis, and any new medicines, treatments, or tests. Also write down any new instructions your provider gives you for your child.    Know why a new medicine or treatment is prescribed and how it will help your child. Also know what the side effects are.    Ask if your child s condition can be treated in other ways.    Know why a test or procedure is recommended and what the results could mean.    Know what to expect if your child does not take the medicine or have the test or procedure.    If your child has a follow-up appointment, write down the date, time, and purpose for that visit.    Know how you can contact your child s provider after office hours. This is important if your child becomes ill and you have questions or need advice.  Margarita last reviewed this  educational content on 2022-2022 [employer]. All rights reserved. This information is not intended as a substitute for professional medical care. Always follow your healthcare professional's instructions.          Circumcision for Children  What is circumcision for children?  Circumcision is a surgery to remove the skin covering the end of the penis. This skin is called the foreskin. This surgery is most often done 1 or 2 days after a baby s birth. Circumcision can also be done on older children. This can be more complex. An older child may need medicine (general anesthesia) to put them to sleep during the procedure.   Why might my child need circumcision?  In some cultures, circumcision is a Tenriism practice or a tradition. It's most common in Yarsanism and Islamic faiths. In the [place]  circumcision isn't required. It's an elective procedure. This means you can choose to have your child circumcised or not. Circumcision is often done 1 to 2 days after birth. It's helpful to decide before your baby is born.   It's important to learn about the benefits and risks of circumcision. According to the American Academy of Pediatrics (AAP):     Problems with the penis (such as irritation) can happen with or without circumcision.    There is no difference in health and cleanliness (hygiene) with or without circumcision, as long as a child can handle cleaning and care.    There is a higher risk of urinary tract infection (UTI) in uncircumcised children. This is more so in babies younger than 1 year old. But the risk for UTI in all children is less than 1%.    Hope circumcision does give some protection from cancer of the penis later in life. But the overall risk of penile cancer is very low in developed countries, such as the [place]    Circumcised kids and adults have a lower risk for some sexually transmitted infections. This includes HIV.  The AAP has found that the health benefits of  circumcision are greater than the risks. But the AAP also found that these benefits are not great enough to advise that all  babies be circumcised. Parents must decide what's best for their baby.   What are the risks of circumcision for a child?  Circumcision has some risks. But the rate of problems is low. The most common risks are bleeding and infection.   The skin of the penis is also very sensitive after a circumcision. The area can get irritated from contact with the baby s diaper or with the ammonia in urine. This can be treated by putting petroleum jelly on the penis for a few days.                                         There may be other risks. This depends on your baby s health. Talk about any concerns you have with the healthcare provider before the surgery.   How do I help my child get ready for circumcision?  Make sure the healthcare provider fully explains the procedure. Ask if anesthetic is used for a circumcision. The AAP advises anesthetic. This helps reduce a baby s pain during the procedure.   If your baby is born early or has other health problems, they may not be circumcised until they're ready to leave the hospital. If your baby has a physical problem with their penis, they may not be circumcised. This is because the foreskin is used in a future surgery on the penis.   What happens during circumcision for a child?  The procedure is usually done by an obstetrician or pediatrician in the hospital. When it's done for Sikhism reasons, other people may do the surgery after the baby comes home from the hospital.   Circumcision is done only on healthy babies. The procedure is painful. So the AAP advises using a local anesthetic. This numbs the area of the penis where the incision will be made. There are different types of anesthetic. A healthcare provider may put a numbing cream on your child s penis. Or they may inject small amounts of anesthetic around the penis. There are risks with any  anesthetic, but these are considered safe. In addition to the anesthetic, your provider may give your baby a pacifier dipped in sugar water. This can help soothe them while the procedure is happening.   A circumcision can be done in several ways. The procedure usually takes about 15 minutes or less. The procedure goes like this:     The healthcare provider will give your baby a local anesthetic.    The provider then cleans the penis with an antiseptic.    The provider will gently loosen the foreskin from around the head of the penis, making a small slit in the foreskin.    The provider may use 1 of the common methods to remove the foreskin. These methods use devices that help protect the penis while removing the foreskin.    The provider may attach a clamp over the head of the penis. Or the provider may place a plastic ring over the head of the penis. This makes it easier to cut the foreskin.    The provider will use surgical tools to remove the foreskin. This exposes the end of the penis.    The provider may place some petroleum jelly or ointment on the head of the penis and cover it with a loose gauze dressing.  What happens after circumcision for a child?  After the circumcision, you'll need to care for your baby s penis until it heals. This includes cleaning the area with plain water at least once a day. You'll also need to clean it if the area is dirty after a bowel movement. Then let the area dry, and put petroleum jelly on it. This keeps the gauze dressing from sticking.   You may be asked to remove the dressing the next day. Or you may be asked to use a new dressing, and some petroleum jelly, each time you change diapers. When the gauze dressing is no longer needed, you may be told to keep putting petroleum jelly on the end of the penis for a few more days. This helps prevent the penis from sticking to the diaper.   Some swelling on the penis is normal. It's also normal for the penis to develop a crust. This  "will go away after a few days. A small amount of bleeding may occur. But if you see a blood stain on your baby s diaper that's bigger than a quarter, call the healthcare provider right away. If the penis keeps bleeding, apply firm pressure with a washcloth for a few minutes. Then look to see if the bleeding has stopped. If the bleeding continues, bring your child to the emergency room.   If a plastic ring was used, it should fall off in 10 to 12 days. Tell your healthcare provider if this doesn t happen.   A baby s penis usually fully heals from a circumcision in 7 to 10 days.   Call your child s healthcare provider if your baby has any of the following:     Fever (see \"Fever and children\" below)    Wound that doesn t stop bleeding    No urine 6 to 8 hours after the procedure    Redness or swelling that doesn t get better after 3 days, or gets worse    Yellow discharge or yellow coating on the penis after 7 days  Fever and children  Use a digital thermometer to check your child s temperature. Don t use a mercury thermometer. There are different kinds and uses of digital thermometers. They include:     Rectal. For children younger than 3 years, a rectal temperature is the most accurate.    Forehead (temporal). This works for children age 3 months and older. If a child under 3 months old has signs of illness, this can be used for a first pass. The provider may want to confirm with a rectal temperature.    Ear (tympanic). Ear temperatures are accurate after 6 months of age, but not before.    Armpit (axillary). This is the least reliable but may be used for a first pass to check a child of any age with signs of illness. The provider may want to confirm with a rectal temperature.    Mouth (oral). Don t use a thermometer in your child s mouth until he or she is at least 4 years old.  Use the rectal thermometer with care. Follow the product maker s directions for correct use. Insert it gently. Label it and make sure it s " not used in the mouth. It may pass on germs from the stool. If you don t feel OK using a rectal thermometer, ask the healthcare provider what type to use instead. When you talk with any healthcare provider about your child s fever, tell him or her which type you used.   Below are guidelines to know if your young child has a fever. Your child s healthcare provider may give you different numbers for your child. Follow your provider s specific instructions.   Fever readings for a baby under 3 months old:     First, ask your child s healthcare provider how you should take the temperature.    Rectal or forehead: 100.4 F (38 C) or higher    Armpit: 99 F (37.2 C) or higher  Fever readings for a child age 3 months to 36 months (3 years):     Rectal, forehead, or ear: 102 F (38.9 C) or higher    Armpit: 101 F (38.3 C) or higher  Call the healthcare provider in these cases:     Repeated temperature of 104 F (40 C) or higher in a child of any age    Fever of 100.4 F (38 C) or higher in baby younger than 3 months    Fever that lasts more than 24 hours in a child under age 2    Fever that lasts for 3 days in a child age 2 or older  Next steps  Before you agree to the test or the procedure for your child make sure you know:     The name of the test or procedure    The reason your child is having the test or procedure    What results to expect and what they mean    The risks and benefits of the test or procedure    When and where your child is to have the test or procedure    Who will do the procedure and what that person s qualifications are    What would happen if your child did not have the test or procedure    Any alternative tests or procedures to think about    When and how will you get the results    Who to call after the test or procedure if you have questions or your child has problems    How much will you have to pay for the test or procedure  Margarita last reviewed this educational content on 3/1/2022    0474-4290 [employer]  [employer]. All rights reserved. This information is not intended as a substitute for professional medical care. Always follow your healthcare professional's instructions.          Labor and Childbirth: Support Person's Notes  You may be excited and anxious about the impending labor and childbirth. You may also wonder how you can help. Learning about the birth process can help you know what to expect. And following the suggestions below can help ease you and the mother through this exciting time.   During early labor    Be sure to time the contractions.    Keep the setting soothing. Dim lights and prevent loud noises. Try playing relaxing music.    Suggest that the mother soak in a warm tub to ease the pain of contractions.    Try to distract the mother from the contractions with a short walk or massage.    Encourage the mother to rest if she's tired.    As contractions become stronger, help her use labor breathing techniques.    During active labor    Have the mother walk or change position at least once an hour. This improves circulation and helps the baby descend.    Keep reminding the mother to breathe and relax through each contraction.    Reassure her. Try to keep her from getting anxious or overstressed.    Take care of yourself. Take a short break to eat or go to the bathroom when you need to.    Rest when the mother does. You'll both benefit.    During a vaginal birth    Help the mother into a pushing position. Support her body as she pushes. A semi-sitting or semi-squatting position allows gravity to assist the birth.    Remind her to rest between contractions. Encourage her by telling her when the baby's head appears.    Keep in mind that you may be masked and gowned for the birth, depending on hospital policy.    During a  birth    You will most likely be able to stay with the mother during the . If you remain with her, you'll wear a mask and surgical gown.    Remember that   birth is surgery. The mother's abdominal area will be draped and out of view. Don't touch the draped areas, which are sterile.    If you aren't allowed to attend the delivery or aren't comfortable doing so, wait with other friends and family members in the family waiting area.    Margarita last reviewed this educational content on 2021-2022 [employer]. All rights reserved. This information is not intended as a substitute for professional medical care. Always follow your healthcare professional's instructions.

## 2023-06-09 ENCOUNTER — PRENATAL OFFICE VISIT (OUTPATIENT)
Dept: MIDWIFE SERVICES | Facility: CLINIC | Age: 28
End: 2023-06-09
Payer: COMMERCIAL

## 2023-06-09 VITALS
SYSTOLIC BLOOD PRESSURE: 118 MMHG | BODY MASS INDEX: 45.36 KG/M2 | DIASTOLIC BLOOD PRESSURE: 72 MMHG | HEART RATE: 110 BPM | OXYGEN SATURATION: 97 % | WEIGHT: 246 LBS

## 2023-06-09 DIAGNOSIS — O09.613 HIGH-RISK FIRST PREGNANCY OF YOUNG WOMAN, THIRD TRIMESTER: Primary | ICD-10-CM

## 2023-06-09 PROCEDURE — 99207 PR PRENATAL VISIT: CPT | Performed by: ADVANCED PRACTICE MIDWIFE

## 2023-06-09 NOTE — PROGRESS NOTES
35w6d  Mally is here by herself today. She says she is doing much better than she was at our last visit. Her partner is home on house arrest now, so at least she has some support at home. Discussed recommendation for 39w induction of labor. She would love to do it even earlier, but discussed that we cannot do that without a medical indication. She has growth US with RIGOBERTO on 6/19/23, will make induction of labor plan after that visit. Baby is very active, denies bleeding, leaking of fluid, headaches. Declines Tdap today. Discussed [employer] resources and reviewed website together. Will plan for GBS/hgb check at next visit in 1 week.   Denver Krueger CNM

## 2023-06-14 NOTE — PATIENT INSTRUCTIONS
Labor and Childbirth: Support Person's Notes  You may be excited and anxious about the impending labor and childbirth. You may also wonder how you can help. Learning about the birth process can help you know what to expect. And following the suggestions below can help ease you and the mother through this exciting time.   During early labor    Be sure to time the contractions.    Keep the setting soothing. Dim lights and prevent loud noises. Try playing relaxing music.    Suggest that the mother soak in a warm tub to ease the pain of contractions.    Try to distract the mother from the contractions with a short walk or massage.    Encourage the mother to rest if she's tired.    As contractions become stronger, help her use labor breathing techniques.    During active labor    Have the mother walk or change position at least once an hour. This improves circulation and helps the baby descend.    Keep reminding the mother to breathe and relax through each contraction.    Reassure her. Try to keep her from getting anxious or overstressed.    Take care of yourself. Take a short break to eat or go to the bathroom when you need to.    Rest when the mother does. You'll both benefit.    During a vaginal birth    Help the mother into a pushing position. Support her body as she pushes. A semi-sitting or semi-squatting position allows gravity to assist the birth.    Remind her to rest between contractions. Encourage her by telling her when the baby's head appears.    Keep in mind that you may be masked and gowned for the birth, depending on hospital policy.    During a  birth    You will most likely be able to stay with the mother during the . If you remain with her, you'll wear a mask and surgical gown.    Remember that  birth is surgery. The mother's abdominal area will be draped and out of view. Don't touch the draped areas, which are sterile.    If you aren't allowed to attend the delivery or  aren't comfortable doing so, wait with other friends and family members in the family waiting area.    [employer] last reviewed this educational content on 7/1/2021 2000-2022 [employer]. All rights reserved. This information is not intended as a substitute for professional medical care. Always follow your healthcare professional's instructions.          Labor and Childbirth: Support Person's Notes  You may be excited and anxious about the impending labor and childbirth. You may also wonder how you can help. Learning about the birth process can help you know what to expect. And following the suggestions below can help ease you and the mother through this exciting time.   During early labor    Be sure to time the contractions.    Keep the setting soothing. Dim lights and prevent loud noises. Try playing relaxing music.    Suggest that the mother soak in a warm tub to ease the pain of contractions.    Try to distract the mother from the contractions with a short walk or massage.    Encourage the mother to rest if she's tired.    As contractions become stronger, help her use labor breathing techniques.    During active labor    Have the mother walk or change position at least once an hour. This improves circulation and helps the baby descend.    Keep reminding the mother to breathe and relax through each contraction.    Reassure her. Try to keep her from getting anxious or overstressed.    Take care of yourself. Take a short break to eat or go to the bathroom when you need to.    Rest when the mother does. You'll both benefit.    During a vaginal birth    Help the mother into a pushing position. Support her body as she pushes. A semi-sitting or semi-squatting position allows gravity to assist the birth.    Remind her to rest between contractions. Encourage her by telling her when the baby's head appears.    Keep in mind that you may be masked and gowned for the birth, depending on hospital  policy.    During a  birth    You will most likely be able to stay with the mother during the . If you remain with her, you'll wear a mask and surgical gown.    Remember that  birth is surgery. The mother's abdominal area will be draped and out of view. Don't touch the draped areas, which are sterile.    If you aren't allowed to attend the delivery or aren't comfortable doing so, wait with other friends and family members in the family waiting area.    [employer] last reviewed this educational content on 2021-2022 [employer]. All rights reserved. This information is not intended as a substitute for professional medical care. Always follow your healthcare professional's instructions.

## 2023-06-16 ENCOUNTER — PRENATAL OFFICE VISIT (OUTPATIENT)
Dept: MIDWIFE SERVICES | Facility: CLINIC | Age: 28
End: 2023-06-16
Payer: COMMERCIAL

## 2023-06-16 VITALS
WEIGHT: 248.7 LBS | BODY MASS INDEX: 45.86 KG/M2 | SYSTOLIC BLOOD PRESSURE: 124 MMHG | DIASTOLIC BLOOD PRESSURE: 80 MMHG | HEART RATE: 114 BPM | TEMPERATURE: 97.4 F

## 2023-06-16 DIAGNOSIS — O99.013 ANEMIA DURING PREGNANCY IN THIRD TRIMESTER: ICD-10-CM

## 2023-06-16 DIAGNOSIS — O09.613 HIGH-RISK FIRST PREGNANCY OF YOUNG WOMAN, THIRD TRIMESTER: Primary | ICD-10-CM

## 2023-06-16 DIAGNOSIS — Z3A.36 36 WEEKS GESTATION OF PREGNANCY: ICD-10-CM

## 2023-06-16 DIAGNOSIS — O99.019 ANEMIA IN PREGNANCY: Primary | ICD-10-CM

## 2023-06-16 LAB
ERYTHROCYTE [DISTWIDTH] IN BLOOD BY AUTOMATED COUNT: 14.4 % (ref 10–15)
FERRITIN SERPL-MCNC: 9 NG/ML (ref 6–175)
HCT VFR BLD AUTO: 32.6 % (ref 35–47)
HGB BLD-MCNC: 10.5 G/DL (ref 11.7–15.7)
HGB BLD-MCNC: 10.6 G/DL (ref 11.7–15.7)
HOLD SPECIMEN: NORMAL
IRON BINDING CAPACITY (ROCHE): 562 UG/DL (ref 240–430)
IRON SATN MFR SERPL: 6 % (ref 15–46)
IRON SERPL-MCNC: 32 UG/DL (ref 37–145)
MCH RBC QN AUTO: 26.5 PG (ref 26.5–33)
MCHC RBC AUTO-ENTMCNC: 32.5 G/DL (ref 31.5–36.5)
MCV RBC AUTO: 82 FL (ref 78–100)
PLATELET # BLD AUTO: 268 10E3/UL (ref 150–450)
RBC # BLD AUTO: 4 10E6/UL (ref 3.8–5.2)
RUPTURE OF FETAL MEMBRANES BY ROM PLUS: NEGATIVE
WBC # BLD AUTO: 9.9 10E3/UL (ref 4–11)

## 2023-06-16 PROCEDURE — 83540 ASSAY OF IRON: CPT | Performed by: ADVANCED PRACTICE MIDWIFE

## 2023-06-16 PROCEDURE — 82728 ASSAY OF FERRITIN: CPT | Performed by: ADVANCED PRACTICE MIDWIFE

## 2023-06-16 PROCEDURE — 84112 EVAL AMNIOTIC FLUID PROTEIN: CPT | Performed by: ADVANCED PRACTICE MIDWIFE

## 2023-06-16 PROCEDURE — 87653 STREP B DNA AMP PROBE: CPT | Performed by: ADVANCED PRACTICE MIDWIFE

## 2023-06-16 PROCEDURE — 85027 COMPLETE CBC AUTOMATED: CPT | Performed by: ADVANCED PRACTICE MIDWIFE

## 2023-06-16 PROCEDURE — 36415 COLL VENOUS BLD VENIPUNCTURE: CPT | Performed by: ADVANCED PRACTICE MIDWIFE

## 2023-06-16 PROCEDURE — 99207 PR PRENATAL VISIT: CPT | Performed by: ADVANCED PRACTICE MIDWIFE

## 2023-06-16 PROCEDURE — 83550 IRON BINDING TEST: CPT | Performed by: ADVANCED PRACTICE MIDWIFE

## 2023-06-16 ASSESSMENT — ANXIETY QUESTIONNAIRES
GAD7 TOTAL SCORE: 12
5. BEING SO RESTLESS THAT IT IS HARD TO SIT STILL: MORE THAN HALF THE DAYS
7. FEELING AFRAID AS IF SOMETHING AWFUL MIGHT HAPPEN: SEVERAL DAYS
GAD7 TOTAL SCORE: 12
6. BECOMING EASILY ANNOYED OR IRRITABLE: MORE THAN HALF THE DAYS
3. WORRYING TOO MUCH ABOUT DIFFERENT THINGS: MORE THAN HALF THE DAYS
1. FEELING NERVOUS, ANXIOUS, OR ON EDGE: MORE THAN HALF THE DAYS
2. NOT BEING ABLE TO STOP OR CONTROL WORRYING: SEVERAL DAYS
IF YOU CHECKED OFF ANY PROBLEMS ON THIS QUESTIONNAIRE, HOW DIFFICULT HAVE THESE PROBLEMS MADE IT FOR YOU TO DO YOUR WORK, TAKE CARE OF THINGS AT HOME, OR GET ALONG WITH OTHER PEOPLE: SOMEWHAT DIFFICULT

## 2023-06-16 ASSESSMENT — PATIENT HEALTH QUESTIONNAIRE - PHQ9
SUM OF ALL RESPONSES TO PHQ QUESTIONS 1-9: 9
5. POOR APPETITE OR OVEREATING: MORE THAN HALF THE DAYS

## 2023-06-16 NOTE — Clinical Note
Can you watch for this Rom plus? When I  her labia to do GBS, noted some thin liquid leaking. She hasn't had any gushes, so maybe just increased discharge. But, worth a check. Thanks Denver

## 2023-06-16 NOTE — PROGRESS NOTES
36w6d  Mally is here for prenatal visit. Ready to be done. Has M U/S for growth on Mon. Discussed that she is candidate for 39w induction of labor, unless [facility] tells us she needs to deliver sooner. She requested SVE today, Cl/long/high. External os was 1cm, but unable to get all the way through. Cervix is med/mid. Noticed some thin discharge when  labia for GBS test, ROM plus collected, but Mally hasn't felt any gushes of fluid. Will send to [facility] to watch for result. Baby is active, denies bleeding, leaking of fluid, headaches, vision changes, bleeding. Some BHC, but nothing regular or painful. Return to care 1 week.  Denver Krueger CNM

## 2023-06-17 LAB — GP B STREP DNA SPEC QL NAA+PROBE: POSITIVE

## 2023-06-19 ENCOUNTER — HOSPITAL ENCOUNTER (OUTPATIENT)
Dept: ULTRASOUND IMAGING | Facility: CLINIC | Age: 28
Discharge: HOME | End: 2023-06-19
Attending: OBSTETRICS & GYNECOLOGY
Payer: COMMERCIAL

## 2023-06-19 ENCOUNTER — OFFICE VISIT (OUTPATIENT)
Dept: MATERNAL FETAL MEDICINE | Facility: CLINIC | Age: 28
End: 2023-06-19
Attending: OBSTETRICS & GYNECOLOGY
Payer: COMMERCIAL

## 2023-06-19 DIAGNOSIS — O99.212 OBESITY AFFECTING PREGNANCY IN SECOND TRIMESTER: ICD-10-CM

## 2023-06-19 DIAGNOSIS — O99.210 OBESITY AFFECTING PREGNANCY, ANTEPARTUM: Primary | ICD-10-CM

## 2023-06-19 PROCEDURE — 76819 FETAL BIOPHYS PROFIL W/O NST: CPT | Mod: 26 | Performed by: OBSTETRICS & GYNECOLOGY

## 2023-06-19 PROCEDURE — 76819 FETAL BIOPHYS PROFIL W/O NST: CPT

## 2023-06-19 PROCEDURE — 76816 OB US FOLLOW-UP PER FETUS: CPT

## 2023-06-19 PROCEDURE — 76816 OB US FOLLOW-UP PER FETUS: CPT | Mod: 26 | Performed by: OBSTETRICS & GYNECOLOGY

## 2023-06-19 NOTE — NURSING NOTE
Patient reports positive fetal movement, denies pain, denies contractions, leaking of fluid, or bleeding.  Patient denies headache or visual changes related to preeclampsia.  Education provided to patient on Counting Fetal Movement and Biophysical Profile.  SBAR given to RIGOBERTO SMITH, see their note in Epic.  Maya Benavides RN

## 2023-06-19 NOTE — PROGRESS NOTES
The patient was seen for an ultrasound in the [facility] at the [facility] today.  For a detailed report of the ultrasound examination, please see the ultrasound report which can be found under the imaging tab.    If you have questions regarding today's evaluation or if we can be of further service, please contact the [facility].    Ana Trevino MD  , OB/GYN  Maternal-Fetal Medicine  [phone] (Pager)

## 2023-06-20 ENCOUNTER — NURSE TRIAGE (OUTPATIENT)
Dept: NURSING | Facility: CLINIC | Age: 28
End: 2023-06-20
Payer: COMMERCIAL

## 2023-06-20 ENCOUNTER — PRENATAL OFFICE VISIT (OUTPATIENT)
Dept: MIDWIFE SERVICES | Facility: CLINIC | Age: 28
End: 2023-06-20
Payer: COMMERCIAL

## 2023-06-20 VITALS
SYSTOLIC BLOOD PRESSURE: 119 MMHG | WEIGHT: 248.4 LBS | DIASTOLIC BLOOD PRESSURE: 80 MMHG | BODY MASS INDEX: 45.8 KG/M2 | TEMPERATURE: 98.4 F | HEART RATE: 105 BPM

## 2023-06-20 DIAGNOSIS — Z34.03 ENCOUNTER FOR SUPERVISION OF NORMAL FIRST PREGNANCY IN THIRD TRIMESTER: Primary | ICD-10-CM

## 2023-06-20 DIAGNOSIS — D50.8 IRON DEFICIENCY ANEMIA SECONDARY TO INADEQUATE DIETARY IRON INTAKE: ICD-10-CM

## 2023-06-20 PROCEDURE — 99207 PR PRENATAL VISIT: CPT | Performed by: ADVANCED PRACTICE MIDWIFE

## 2023-06-20 RX ORDER — ASCORBIC ACID 250 MG
250 TABLET,CHEWABLE ORAL EVERY OTHER DAY
Qty: 90 TABLET | Refills: 1 | Status: SHIPPED | OUTPATIENT
Start: 2023-06-20 | End: 2023-08-18

## 2023-06-20 RX ORDER — FERROUS GLUCONATE 324(38)MG
324 TABLET ORAL EVERY OTHER DAY
Qty: 90 TABLET | Refills: 1 | Status: SHIPPED | OUTPATIENT
Start: 2023-06-20 | End: 2023-08-18

## 2023-06-20 RX ORDER — LANOLIN ALCOHOL/MO/W.PET/CERES
1000 CREAM (GRAM) TOPICAL EVERY OTHER DAY
Qty: 90 TABLET | Refills: 1 | Status: SHIPPED | OUTPATIENT
Start: 2023-06-20 | End: 2023-08-18

## 2023-06-20 NOTE — PROGRESS NOTES
37w3d  Patient is feeling well. Positive fetal movement. Denies water leaking, vaginal bleeding, decreased fetal movement, contraction pain, or headaches.   Doing well overall but here today for unscheduled visit due to developing a migraine last night. She was up some of the night dealing with it. It is a normal migraine for her, right sided and goes down her neck and back. She normally uses Tylenol Excedrin but is not wanting to take medications. She is trying hydration and rest. She was able to take work off today. She also plans to see the chiropractor which normally helps a lot with her migraines. We discussed pre-eclampsia signs and symptoms and blood pressure. Her blood pressure remains normal today. She will continue to monitor and let us know if anything changes. We also discussed her hemoglobin. She will increase iron in her diet and would like oral iron. We discussed IV iron as well. She decided on oral iron for now, RX sent. We also discussed IOL at 39 weeks and macrosomia. She is unsure what she will do and we will continue the conversation over her next few visits. She has no other questions or concerns at this time.    Danger signs reviewed, pre-eclampsia signs and symptoms discussed.   Knows when to call triage and has phone numbers.   Follow up Friday as planned.  ROSENDO Sotomayor CNM

## 2023-06-20 NOTE — TELEPHONE ENCOUNTER
Headache and nausea denies roxane contractions or vaginal discharge or bleeding.  OB Triage Call      Is patient's OB/Midwife with the [facility] or [facility]? [facility]- Proceed with triage     Reason for call: headache and nausea was woken from sleep at 10 pm last night  She has been massaging her head and cold compresses, she has been drinking more water.  She is describing it as a migraine.  She does not take tylenol.    Assessment: massaging head cold compress drinking more water    Plan: Nurse midwife will call her and arrage a clinic appointment for a blood pressure check in office.    Patient plans to deliver at [facility]    Patient's primary OB Provider is Denver Krueger CNM.      Per protocol recommendations Patient to schedule follow up appointment within today.      Is patient's delivering hospital on divert? No      37w3d    Estimated Date of Delivery: 2023        OB History    Para Term  AB Living   3 0 0 0 2 0   SAB IAB Ectopic Multiple Live Births   2 0 0 0 0      # Outcome Date GA Lbr Kishan/2nd Weight Sex Delivery Anes PTL Lv   3 Current            2 SAB 2019 5w0d    SAB      1 SAB 2017 5w0d    SAB          No results found for: GBS       Gwen Huggins RN 23 7:10 AM  [facility] Nurse Advisor    Reason for Disposition    SEVERE headache and 'worst headache' of life    Additional Information    Negative: Difficult to awaken or acting confused (e.g., disoriented, slurred speech)    Negative: Weakness of the face, arm or leg on one side of the body and new-onset    Negative: Numbness of the face, arm or leg on one side of the body and new-onset    Negative: Loss of speech or garbled speech and new-onset    Negative: Passed out (i.e., fainted, collapsed and was not responding)    Negative: Sounds like a life-threatening emergency to the triager    Negative: Followed a head injury within last 3 days    Negative: Traumatic Brain Injury (TBI)  is suspected    Negative: Sinus pain of forehead and yellow or green nasal discharge    Negative: Unable to walk without falling    Negative: Stiff neck (can't touch chin to chest)    Negative: Possibility of carbon monoxide exposure    Negative: SEVERE headache (e.g., excruciating) and having contractions or other symptoms of labor    Negative: Pregnant 20 or more weeks with Systolic BP >= 140 OR Diastolic BP >= 90    Negative: Pregnant 20 or more weeks and SEVERE headache (e.g., excruciating) that is not improved after 2 hours of pain medicine    Negative: Pregnant 20 or more weeks and new hand or face swelling    Negative: Pregnant 20 or more weeks and new blurred vision or vision changes    Negative: Pregnant 20 or more weeks and sudden weight gain (i.e., more than 3 lbs or 1.4 kg in one week)    Negative: Pregnant 20 or more weeks and upper abdomen pain    Negative: Pregnant 23 or more weeks and baby is moving less today (e.g., kick count < 5 in 1 hour or < 10 in 2 hours)    Protocols used: PREGNANCY - HEADACHE-A-OH

## 2023-06-26 ENCOUNTER — OFFICE VISIT (OUTPATIENT)
Dept: MATERNAL FETAL MEDICINE | Facility: CLINIC | Age: 28
End: 2023-06-26
Attending: OBSTETRICS & GYNECOLOGY
Payer: COMMERCIAL

## 2023-06-26 ENCOUNTER — HOSPITAL ENCOUNTER (OUTPATIENT)
Dept: ULTRASOUND IMAGING | Facility: CLINIC | Age: 28
Discharge: HOME | End: 2023-06-26
Attending: OBSTETRICS & GYNECOLOGY
Payer: COMMERCIAL

## 2023-06-26 DIAGNOSIS — O99.210 OBESITY AFFECTING PREGNANCY, ANTEPARTUM: ICD-10-CM

## 2023-06-26 DIAGNOSIS — O99.210 OBESITY AFFECTING PREGNANCY, ANTEPARTUM: Primary | ICD-10-CM

## 2023-06-26 PROCEDURE — 76819 FETAL BIOPHYS PROFIL W/O NST: CPT

## 2023-06-26 PROCEDURE — 76819 FETAL BIOPHYS PROFIL W/O NST: CPT | Mod: 26 | Performed by: OBSTETRICS & GYNECOLOGY

## 2023-06-28 NOTE — PROGRESS NOTES
"Please see \"Imaging\" tab under \"Chart Review\" for details of today's ultrasound.    Francois Drew M.D.  Specialist in Maternal-Fetal Medicine     "

## 2023-06-29 ENCOUNTER — PRENATAL OFFICE VISIT (OUTPATIENT)
Dept: MIDWIFE SERVICES | Facility: CLINIC | Age: 28
End: 2023-06-29
Payer: COMMERCIAL

## 2023-06-29 VITALS
WEIGHT: 255 LBS | SYSTOLIC BLOOD PRESSURE: 128 MMHG | HEART RATE: 84 BPM | OXYGEN SATURATION: 100 % | BODY MASS INDEX: 47.02 KG/M2 | DIASTOLIC BLOOD PRESSURE: 88 MMHG | TEMPERATURE: 98 F

## 2023-06-29 DIAGNOSIS — Z34.03 ENCOUNTER FOR SUPERVISION OF NORMAL FIRST PREGNANCY IN THIRD TRIMESTER: Primary | ICD-10-CM

## 2023-06-29 PROCEDURE — 99207 PR PRENATAL VISIT: CPT | Performed by: ADVANCED PRACTICE MIDWIFE

## 2023-06-29 NOTE — PATIENT INSTRUCTIONS
Dear Mally,    Your induction is scheduled for Saturday July 1st @ 0730.  Call labor and delivery 1 hour before you come in: [phone] to check in.  You can park in the green ramp under the [facility], then proceed to the [facility] which is on the 4th floor in the East building.    Congratulations ahead of time, we are so excited for you.    If you have questions or concerns before that time please call our clinic @ [phone] and press option 1    ROSENDO Osman CNM

## 2023-06-29 NOTE — PROGRESS NOTES
38w5d  Mally is here and feeling well. Baby is moving as much as normal. Denies contractions, bleeding, LOF, headaches, and visual changes.     Discussed recommendation from [facility] for induction at 39 weeks due to EFW >99%. Mally wishes to proceed with scheduling IOL. IOL set for 23 at 0730. Will call one hour ahead of time.     Discussed higher incidence of shoulder dystocia with larger babies. Provided education about what shoulder dystocia is and described what maneuvers we might do to resolve a shoulder dystocia. Discussed clavicle fracture as a risk as well as zavanelli maneuver proceeding to  section as last resort. Mally is feeling anxious about this but wishes to proceed with vaginal delivery. Will call clinic with any questions/ concerns that arise before .     Evonne LYMAN    I was present with the SANDRA student who participated in the service and in the documentation of the services provided.  I have verified the history and personally performed the physical exam and medical decision making, as documented by the student and edited by me.  ROSENDO Osman PETER

## 2023-07-01 ENCOUNTER — HOSPITAL ENCOUNTER (INPATIENT)
Facility: CLINIC | Age: 28
LOS: 3 days | Discharge: HOME | End: 2023-07-04
Attending: ADVANCED PRACTICE MIDWIFE | Admitting: ADVANCED PRACTICE MIDWIFE
Payer: COMMERCIAL

## 2023-07-01 ENCOUNTER — ANESTHESIA EVENT (OUTPATIENT)
Dept: OBGYN | Facility: CLINIC | Age: 28
End: 2023-07-01
Payer: COMMERCIAL

## 2023-07-01 ENCOUNTER — ANESTHESIA (OUTPATIENT)
Dept: OBGYN | Facility: CLINIC | Age: 28
End: 2023-07-01
Payer: COMMERCIAL

## 2023-07-01 DIAGNOSIS — D62 ANEMIA DUE TO BLOOD LOSS, ACUTE: ICD-10-CM

## 2023-07-01 LAB
ABO/RH(D): NORMAL
ANTIBODY SCREEN: NEGATIVE
ERYTHROCYTE [DISTWIDTH] IN BLOOD BY AUTOMATED COUNT: 15.1 % (ref 10–15)
HCT VFR BLD AUTO: 33.4 % (ref 35–47)
HGB BLD-MCNC: 10.7 G/DL (ref 11.7–15.7)
MCH RBC QN AUTO: 25.7 PG (ref 26.5–33)
MCHC RBC AUTO-ENTMCNC: 32 G/DL (ref 31.5–36.5)
MCV RBC AUTO: 80 FL (ref 78–100)
PLATELET # BLD AUTO: 255 10E3/UL (ref 150–450)
RBC # BLD AUTO: 4.16 10E6/UL (ref 3.8–5.2)
SARS-COV-2 RNA RESP QL NAA+PROBE: NEGATIVE
SPECIMEN EXPIRATION DATE: NORMAL
WBC # BLD AUTO: 11.3 10E3/UL (ref 4–11)

## 2023-07-01 PROCEDURE — 00HU33Z INSERTION OF INFUSION DEVICE INTO SPINAL CANAL, PERCUTANEOUS APPROACH: ICD-10-PCS | Performed by: STUDENT IN AN ORGANIZED HEALTH CARE EDUCATION/TRAINING PROGRAM

## 2023-07-01 PROCEDURE — 258N000003 HC RX IP 258 OP 636: Performed by: ADVANCED PRACTICE MIDWIFE

## 2023-07-01 PROCEDURE — 250N000011 HC RX IP 250 OP 636: Performed by: STUDENT IN AN ORGANIZED HEALTH CARE EDUCATION/TRAINING PROGRAM

## 2023-07-01 PROCEDURE — 250N000013 HC RX MED GY IP 250 OP 250 PS 637: Performed by: ADVANCED PRACTICE MIDWIFE

## 2023-07-01 PROCEDURE — 86780 TREPONEMA PALLIDUM: CPT | Performed by: ADVANCED PRACTICE MIDWIFE

## 2023-07-01 PROCEDURE — 3E0R3BZ INTRODUCTION OF ANESTHETIC AGENT INTO SPINAL CANAL, PERCUTANEOUS APPROACH: ICD-10-PCS | Performed by: STUDENT IN AN ORGANIZED HEALTH CARE EDUCATION/TRAINING PROGRAM

## 2023-07-01 PROCEDURE — C9803 HOPD COVID-19 SPEC COLLECT: HCPCS

## 2023-07-01 PROCEDURE — 59025 FETAL NON-STRESS TEST: CPT | Mod: 26 | Performed by: ADVANCED PRACTICE MIDWIFE

## 2023-07-01 PROCEDURE — 258N000003 HC RX IP 258 OP 636

## 2023-07-01 PROCEDURE — 120N000002 HC R&B MED SURG/OB UMMC

## 2023-07-01 PROCEDURE — 258N000003 HC RX IP 258 OP 636: Performed by: STUDENT IN AN ORGANIZED HEALTH CARE EDUCATION/TRAINING PROGRAM

## 2023-07-01 PROCEDURE — 85027 COMPLETE CBC AUTOMATED: CPT | Performed by: ADVANCED PRACTICE MIDWIFE

## 2023-07-01 PROCEDURE — 250N000011 HC RX IP 250 OP 636: Mod: JZ | Performed by: STUDENT IN AN ORGANIZED HEALTH CARE EDUCATION/TRAINING PROGRAM

## 2023-07-01 PROCEDURE — 86901 BLOOD TYPING SEROLOGIC RH(D): CPT | Performed by: ADVANCED PRACTICE MIDWIFE

## 2023-07-01 PROCEDURE — 370N000003 HC ANESTHESIA WARD SERVICE: Performed by: ANESTHESIOLOGY

## 2023-07-01 PROCEDURE — 86850 RBC ANTIBODY SCREEN: CPT | Performed by: ADVANCED PRACTICE MIDWIFE

## 2023-07-01 PROCEDURE — 250N000011 HC RX IP 250 OP 636: Mod: JZ | Performed by: ADVANCED PRACTICE MIDWIFE

## 2023-07-01 PROCEDURE — 87635 SARS-COV-2 COVID-19 AMP PRB: CPT | Performed by: ADVANCED PRACTICE MIDWIFE

## 2023-07-01 PROCEDURE — 3E0DXGC INTRODUCTION OF OTHER THERAPEUTIC SUBSTANCE INTO MOUTH AND PHARYNX, EXTERNAL APPROACH: ICD-10-PCS | Performed by: ADVANCED PRACTICE MIDWIFE

## 2023-07-01 RX ORDER — ONDANSETRON 2 MG/ML
4 INJECTION INTRAMUSCULAR; INTRAVENOUS EVERY 6 HOURS PRN
Status: DISCONTINUED | OUTPATIENT
Start: 2023-07-01 | End: 2023-07-02 | Stop reason: HOSPADM

## 2023-07-01 RX ORDER — LIDOCAINE 40 MG/G
CREAM TOPICAL
Status: DISCONTINUED | OUTPATIENT
Start: 2023-07-01 | End: 2023-07-02 | Stop reason: HOSPADM

## 2023-07-01 RX ORDER — KETOROLAC TROMETHAMINE 30 MG/ML
30 INJECTION, SOLUTION INTRAMUSCULAR; INTRAVENOUS
Status: DISCONTINUED | OUTPATIENT
Start: 2023-07-01 | End: 2023-07-04 | Stop reason: HOSPADM

## 2023-07-01 RX ORDER — ACETAMINOPHEN 325 MG/1
650 TABLET ORAL EVERY 4 HOURS PRN
Status: DISCONTINUED | OUTPATIENT
Start: 2023-07-01 | End: 2023-07-02 | Stop reason: HOSPADM

## 2023-07-01 RX ORDER — FENTANYL CITRATE 50 UG/ML
100 INJECTION, SOLUTION INTRAMUSCULAR; INTRAVENOUS
Status: DISCONTINUED | OUTPATIENT
Start: 2023-07-01 | End: 2023-07-02 | Stop reason: HOSPADM

## 2023-07-01 RX ORDER — METHYLERGONOVINE MALEATE 0.2 MG/ML
200 INJECTION INTRAVENOUS
Status: DISCONTINUED | OUTPATIENT
Start: 2023-07-01 | End: 2023-07-02 | Stop reason: HOSPADM

## 2023-07-01 RX ORDER — IBUPROFEN 800 MG/1
800 TABLET, FILM COATED ORAL
Status: DISCONTINUED | OUTPATIENT
Start: 2023-07-01 | End: 2023-07-04 | Stop reason: HOSPADM

## 2023-07-01 RX ORDER — SODIUM CHLORIDE, SODIUM LACTATE, POTASSIUM CHLORIDE, CALCIUM CHLORIDE 600; 310; 30; 20 MG/100ML; MG/100ML; MG/100ML; MG/100ML
INJECTION, SOLUTION INTRAVENOUS
Status: COMPLETED
Start: 2023-07-01 | End: 2023-07-01

## 2023-07-01 RX ORDER — PENICILLIN G POTASSIUM 5000000 [IU]/1
5 INJECTION, POWDER, FOR SOLUTION INTRAMUSCULAR; INTRAVENOUS ONCE
Status: DISCONTINUED | OUTPATIENT
Start: 2023-07-01 | End: 2023-07-02 | Stop reason: HOSPADM

## 2023-07-01 RX ORDER — NALOXONE HYDROCHLORIDE 0.4 MG/ML
0.4 INJECTION, SOLUTION INTRAMUSCULAR; INTRAVENOUS; SUBCUTANEOUS
Status: DISCONTINUED | OUTPATIENT
Start: 2023-07-01 | End: 2023-07-02 | Stop reason: HOSPADM

## 2023-07-01 RX ORDER — OXYTOCIN/0.9 % SODIUM CHLORIDE 30/500 ML
100-340 PLASTIC BAG, INJECTION (ML) INTRAVENOUS CONTINUOUS PRN
Status: DISCONTINUED | OUTPATIENT
Start: 2023-07-01 | End: 2023-07-04 | Stop reason: HOSPADM

## 2023-07-01 RX ORDER — NALOXONE HYDROCHLORIDE 0.4 MG/ML
0.2 INJECTION, SOLUTION INTRAMUSCULAR; INTRAVENOUS; SUBCUTANEOUS
Status: DISCONTINUED | OUTPATIENT
Start: 2023-07-01 | End: 2023-07-02 | Stop reason: HOSPADM

## 2023-07-01 RX ORDER — FENTANYL CITRATE-0.9 % NACL/PF 10 MCG/ML
100 PLASTIC BAG, INJECTION (ML) INTRAVENOUS EVERY 5 MIN PRN
Status: DISCONTINUED | OUTPATIENT
Start: 2023-07-01 | End: 2023-07-02 | Stop reason: HOSPADM

## 2023-07-01 RX ORDER — MISOPROSTOL 100 UG/1
25 TABLET ORAL
Status: DISCONTINUED | OUTPATIENT
Start: 2023-07-01 | End: 2023-07-02 | Stop reason: HOSPADM

## 2023-07-01 RX ORDER — CALCIUM CARBONATE 500 MG/1
1000 TABLET, CHEWABLE ORAL 3 TIMES DAILY PRN
Status: DISCONTINUED | OUTPATIENT
Start: 2023-07-01 | End: 2023-07-04 | Stop reason: HOSPADM

## 2023-07-01 RX ORDER — PENICILLIN G 3000000 [IU]/50ML
3 INJECTION, SOLUTION INTRAVENOUS EVERY 4 HOURS
Status: DISCONTINUED | OUTPATIENT
Start: 2023-07-01 | End: 2023-07-02 | Stop reason: HOSPADM

## 2023-07-01 RX ORDER — MISOPROSTOL 200 UG/1
400 TABLET ORAL
Status: DISCONTINUED | OUTPATIENT
Start: 2023-07-01 | End: 2023-07-02 | Stop reason: HOSPADM

## 2023-07-01 RX ORDER — METOCLOPRAMIDE 10 MG/1
10 TABLET ORAL EVERY 6 HOURS PRN
Status: DISCONTINUED | OUTPATIENT
Start: 2023-07-01 | End: 2023-07-02 | Stop reason: HOSPADM

## 2023-07-01 RX ORDER — NALBUPHINE HYDROCHLORIDE 10 MG/ML
2.5-5 INJECTION, SOLUTION INTRAMUSCULAR; INTRAVENOUS; SUBCUTANEOUS EVERY 6 HOURS PRN
Status: DISCONTINUED | OUTPATIENT
Start: 2023-07-01 | End: 2023-07-04 | Stop reason: HOSPADM

## 2023-07-01 RX ORDER — MISOPROSTOL 200 UG/1
800 TABLET ORAL
Status: DISCONTINUED | OUTPATIENT
Start: 2023-07-01 | End: 2023-07-02 | Stop reason: HOSPADM

## 2023-07-01 RX ORDER — TRANEXAMIC ACID 10 MG/ML
1 INJECTION, SOLUTION INTRAVENOUS EVERY 30 MIN PRN
Status: DISCONTINUED | OUTPATIENT
Start: 2023-07-01 | End: 2023-07-02 | Stop reason: HOSPADM

## 2023-07-01 RX ORDER — FENTANYL/ROPIVACAINE/NS/PF 2MCG/ML-.1
PLASTIC BAG, INJECTION (ML) EPIDURAL
Status: DISCONTINUED | OUTPATIENT
Start: 2023-07-01 | End: 2023-07-02 | Stop reason: HOSPADM

## 2023-07-01 RX ORDER — CARBOPROST TROMETHAMINE 250 UG/ML
250 INJECTION, SOLUTION INTRAMUSCULAR
Status: DISCONTINUED | OUTPATIENT
Start: 2023-07-01 | End: 2023-07-02 | Stop reason: HOSPADM

## 2023-07-01 RX ORDER — ONDANSETRON 4 MG/1
4 TABLET, ORALLY DISINTEGRATING ORAL EVERY 6 HOURS PRN
Status: DISCONTINUED | OUTPATIENT
Start: 2023-07-01 | End: 2023-07-02 | Stop reason: HOSPADM

## 2023-07-01 RX ORDER — OXYTOCIN/0.9 % SODIUM CHLORIDE 30/500 ML
340 PLASTIC BAG, INJECTION (ML) INTRAVENOUS CONTINUOUS PRN
Status: DISCONTINUED | OUTPATIENT
Start: 2023-07-01 | End: 2023-07-02 | Stop reason: HOSPADM

## 2023-07-01 RX ORDER — OXYTOCIN 10 [USP'U]/ML
10 INJECTION, SOLUTION INTRAMUSCULAR; INTRAVENOUS
Status: DISCONTINUED | OUTPATIENT
Start: 2023-07-01 | End: 2023-07-02 | Stop reason: HOSPADM

## 2023-07-01 RX ORDER — PROCHLORPERAZINE MALEATE 10 MG
10 TABLET ORAL EVERY 6 HOURS PRN
Status: DISCONTINUED | OUTPATIENT
Start: 2023-07-01 | End: 2023-07-02 | Stop reason: HOSPADM

## 2023-07-01 RX ORDER — METOCLOPRAMIDE HYDROCHLORIDE 5 MG/ML
10 INJECTION INTRAMUSCULAR; INTRAVENOUS EVERY 6 HOURS PRN
Status: DISCONTINUED | OUTPATIENT
Start: 2023-07-01 | End: 2023-07-02 | Stop reason: HOSPADM

## 2023-07-01 RX ORDER — PROCHLORPERAZINE 25 MG
25 SUPPOSITORY, RECTAL RECTAL EVERY 12 HOURS PRN
Status: DISCONTINUED | OUTPATIENT
Start: 2023-07-01 | End: 2023-07-02 | Stop reason: HOSPADM

## 2023-07-01 RX ORDER — OXYTOCIN 10 [USP'U]/ML
10 INJECTION, SOLUTION INTRAMUSCULAR; INTRAVENOUS
Status: DISCONTINUED | OUTPATIENT
Start: 2023-07-01 | End: 2023-07-04 | Stop reason: HOSPADM

## 2023-07-01 RX ORDER — CITRIC ACID/SODIUM CITRATE 334-500MG
30 SOLUTION, ORAL ORAL
Status: DISCONTINUED | OUTPATIENT
Start: 2023-07-01 | End: 2023-07-02 | Stop reason: HOSPADM

## 2023-07-01 RX ADMIN — SODIUM CHLORIDE, POTASSIUM CHLORIDE, SODIUM LACTATE AND CALCIUM CHLORIDE 1000 ML: 600; 310; 30; 20 INJECTION, SOLUTION INTRAVENOUS at 21:12

## 2023-07-01 RX ADMIN — SODIUM CHLORIDE, POTASSIUM CHLORIDE, SODIUM LACTATE AND CALCIUM CHLORIDE 500 ML: 600; 310; 30; 20 INJECTION, SOLUTION INTRAVENOUS at 18:56

## 2023-07-01 RX ADMIN — MISOPROSTOL 25 MCG: 100 TABLET ORAL at 13:32

## 2023-07-01 RX ADMIN — Medication: at 21:01

## 2023-07-01 RX ADMIN — SODIUM CHLORIDE, POTASSIUM CHLORIDE, SODIUM LACTATE AND CALCIUM CHLORIDE 500 ML: 600; 310; 30; 20 INJECTION, SOLUTION INTRAVENOUS at 15:31

## 2023-07-01 RX ADMIN — MISOPROSTOL 25 MCG: 100 TABLET ORAL at 15:26

## 2023-07-01 RX ADMIN — PENICILLIN G 3 MILLION UNITS: 3000000 INJECTION, SOLUTION INTRAVENOUS at 21:13

## 2023-07-01 RX ADMIN — BUPIVACAINE HYDROCHLORIDE 10 ML: 2.5 INJECTION, SOLUTION EPIDURAL; INFILTRATION; INTRACAUDAL at 20:51

## 2023-07-01 ASSESSMENT — ACTIVITIES OF DAILY LIVING (ADL)
ADLS_ACUITY_SCORE: 18
ADLS_ACUITY_SCORE: 20
ADLS_ACUITY_SCORE: 18

## 2023-07-01 ASSESSMENT — LIFESTYLE VARIABLES: TOBACCO_USE: 1

## 2023-07-01 NOTE — PROGRESS NOTES
S: Mally is getting much more uncomfortable, starting to ask about pain management options. She is using the birthing ball and upright positioning. Her partner is at the bedside and supportive.    O:  Blood pressure 128/84, temperature 97.8  F (36.6  C), temperature source Oral, resp. rate 17, last menstrual period 10/01/2022, SpO2 96 %, not currently breastfeeding.  General appearance: uncomfortable with contractions    CONTACTIONS: Contractions every 1-5 minutes.  Palpate: moderate  FETAL HEART TONES: baseline 130 with moderate FHR variability and    accelerations yes. Decelerations yes-1 variable.    ROM: not ruptured  PELVIC EXAM: deferred-prefers to wait until after her epidural  Fetal Position:  Cephalic-confirmed by BSUS  Bloody show: No  Pitocin- none,  Antibiotics- none  Cervical ripening: misoprostol (s/p 2 doses of oral miso >2 hours ago)    ASSESSMENT:  ==============  IUP @ 39w0d early labor   induction of labor for severe obesity and concern for LGA fetus  Fetal Heart rate tracing category two due to variable decel  GBS- positive     PLAN:  ===========  comfort measures prn   Pain medication-anesthesia notified and now at bedside to consent for epidural  Anticipate   reevaluate in 2-4 hours/PRN  Consider pitocin augmentation if contractions space   SVE after Mally is comfortable with her epidural.    Denver Krueger CNM
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
## 2023-07-01 NOTE — PROGRESS NOTES
S: Mally is homero well and feeling like they are getting stronger. She is standing/rocking to cope with contractions, and breathing through them. She is using some forward leaning positions to manage as well.     O:  Blood pressure 128/84, temperature 97.8  F (36.6  C), temperature source Oral, resp. rate 17, last menstrual period 10/01/2022, SpO2 96 %, not currently breastfeeding.  General appearance: uncomfortable with contractions    CONTACTIONS: Contractions every 1-3 minutes.  Palpate: mild  FETAL HEART TONES: baseline 135 with moderate FHR variability and    accelerations yes. Decelerations no.    NST: REACTIVE  ROM: not ruptured  PELVIC EXAM: deferred  Fetal Position:  cephalic  Bloody show: No  Pitocin- none,  Antibiotics- none  Cervical ripening: misoprostol x2 doses done    ASSESSMENT:  ==============  IUP @ 39w0d early labor   induction of labor for severe obesity and concern for LGA fetus  Fetal Heart rate tracing category one  GBS- positive     PLAN:  ===========  comfort measures prn   cervical ripening with misoprostol-s/p two doses. Discussed that she is homero too much for another dose. If contractions space or become less intense, discussed doing SVE to see if it is time to start pitocin.   Pain medication per patient request.  Anticipate   reevaluate in 2-4 hours/PRN     Denver Krueger CNM

## 2023-07-01 NOTE — PLAN OF CARE
Patient presents for IOL for macrosomia. Patient reports positive fetal movement, no LOF or VB, reports intermittent ctx. Admission assessment done and POC for PO miso cervical ripening, continuous EFM. Patient coping well with labor. Continue plan of care.

## 2023-07-02 LAB — T PALLIDUM AB SER QL: NONREACTIVE

## 2023-07-02 PROCEDURE — 250N000009 HC RX 250: Performed by: ADVANCED PRACTICE MIDWIFE

## 2023-07-02 PROCEDURE — 250N000011 HC RX IP 250 OP 636: Mod: JZ | Performed by: ADVANCED PRACTICE MIDWIFE

## 2023-07-02 PROCEDURE — 10907ZC DRAINAGE OF AMNIOTIC FLUID, THERAPEUTIC FROM PRODUCTS OF CONCEPTION, VIA NATURAL OR ARTIFICIAL OPENING: ICD-10-PCS | Performed by: ADVANCED PRACTICE MIDWIFE

## 2023-07-02 PROCEDURE — 258N000003 HC RX IP 258 OP 636: Performed by: ADVANCED PRACTICE MIDWIFE

## 2023-07-02 PROCEDURE — 999N000016 HC STATISTIC ATTENDANCE AT DELIVERY

## 2023-07-02 PROCEDURE — 722N000001 HC LABOR CARE VAGINAL DELIVERY SINGLE

## 2023-07-02 PROCEDURE — 250N000013 HC RX MED GY IP 250 OP 250 PS 637: Performed by: ADVANCED PRACTICE MIDWIFE

## 2023-07-02 PROCEDURE — 0KQM0ZZ REPAIR PERINEUM MUSCLE, OPEN APPROACH: ICD-10-PCS | Performed by: ADVANCED PRACTICE MIDWIFE

## 2023-07-02 PROCEDURE — 59400 OBSTETRICAL CARE: CPT | Performed by: ADVANCED PRACTICE MIDWIFE

## 2023-07-02 PROCEDURE — 120N000002 HC R&B MED SURG/OB UMMC

## 2023-07-02 RX ORDER — TRANEXAMIC ACID 10 MG/ML
1 INJECTION, SOLUTION INTRAVENOUS EVERY 30 MIN PRN
Status: DISCONTINUED | OUTPATIENT
Start: 2023-07-02 | End: 2023-07-04 | Stop reason: HOSPADM

## 2023-07-02 RX ORDER — METHYLERGONOVINE MALEATE 0.2 MG/ML
200 INJECTION INTRAVENOUS
Status: DISCONTINUED | OUTPATIENT
Start: 2023-07-02 | End: 2023-07-04 | Stop reason: HOSPADM

## 2023-07-02 RX ORDER — LIDOCAINE 40 MG/G
CREAM TOPICAL
Status: DISCONTINUED | OUTPATIENT
Start: 2023-07-02 | End: 2023-07-02 | Stop reason: HOSPADM

## 2023-07-02 RX ORDER — CARBOPROST TROMETHAMINE 250 UG/ML
250 INJECTION, SOLUTION INTRAMUSCULAR
Status: DISCONTINUED | OUTPATIENT
Start: 2023-07-02 | End: 2023-07-04 | Stop reason: HOSPADM

## 2023-07-02 RX ORDER — DOCUSATE SODIUM 100 MG/1
100 CAPSULE, LIQUID FILLED ORAL DAILY
Status: DISCONTINUED | OUTPATIENT
Start: 2023-07-02 | End: 2023-07-04 | Stop reason: HOSPADM

## 2023-07-02 RX ORDER — SODIUM CHLORIDE, SODIUM LACTATE, POTASSIUM CHLORIDE, CALCIUM CHLORIDE 600; 310; 30; 20 MG/100ML; MG/100ML; MG/100ML; MG/100ML
INJECTION, SOLUTION INTRAVENOUS CONTINUOUS
Status: DISCONTINUED | OUTPATIENT
Start: 2023-07-01 | End: 2023-07-04 | Stop reason: HOSPADM

## 2023-07-02 RX ORDER — ACETAMINOPHEN 325 MG/1
650 TABLET ORAL EVERY 4 HOURS PRN
Status: DISCONTINUED | OUTPATIENT
Start: 2023-07-02 | End: 2023-07-04 | Stop reason: HOSPADM

## 2023-07-02 RX ORDER — MISOPROSTOL 200 UG/1
800 TABLET ORAL
Status: DISCONTINUED | OUTPATIENT
Start: 2023-07-02 | End: 2023-07-04 | Stop reason: HOSPADM

## 2023-07-02 RX ORDER — MODIFIED LANOLIN
OINTMENT (GRAM) TOPICAL
Status: DISCONTINUED | OUTPATIENT
Start: 2023-07-02 | End: 2023-07-04 | Stop reason: HOSPADM

## 2023-07-02 RX ORDER — BISACODYL 10 MG
10 SUPPOSITORY, RECTAL RECTAL DAILY PRN
Status: DISCONTINUED | OUTPATIENT
Start: 2023-07-02 | End: 2023-07-04 | Stop reason: HOSPADM

## 2023-07-02 RX ORDER — OXYTOCIN/0.9 % SODIUM CHLORIDE 30/500 ML
1-24 PLASTIC BAG, INJECTION (ML) INTRAVENOUS CONTINUOUS
Status: DISCONTINUED | OUTPATIENT
Start: 2023-07-02 | End: 2023-07-02 | Stop reason: HOSPADM

## 2023-07-02 RX ORDER — MISOPROSTOL 200 UG/1
400 TABLET ORAL
Status: DISCONTINUED | OUTPATIENT
Start: 2023-07-02 | End: 2023-07-04 | Stop reason: HOSPADM

## 2023-07-02 RX ORDER — IBUPROFEN 800 MG/1
800 TABLET, FILM COATED ORAL EVERY 6 HOURS PRN
Status: DISCONTINUED | OUTPATIENT
Start: 2023-07-02 | End: 2023-07-04 | Stop reason: HOSPADM

## 2023-07-02 RX ORDER — HYDROCORTISONE 25 MG/G
CREAM TOPICAL 3 TIMES DAILY PRN
Status: DISCONTINUED | OUTPATIENT
Start: 2023-07-02 | End: 2023-07-04 | Stop reason: HOSPADM

## 2023-07-02 RX ORDER — SODIUM CHLORIDE, SODIUM LACTATE, POTASSIUM CHLORIDE, CALCIUM CHLORIDE 600; 310; 30; 20 MG/100ML; MG/100ML; MG/100ML; MG/100ML
INJECTION, SOLUTION INTRAVENOUS CONTINUOUS PRN
Status: DISCONTINUED | OUTPATIENT
Start: 2023-07-02 | End: 2023-07-02 | Stop reason: HOSPADM

## 2023-07-02 RX ORDER — OXYTOCIN/0.9 % SODIUM CHLORIDE 30/500 ML
340 PLASTIC BAG, INJECTION (ML) INTRAVENOUS CONTINUOUS PRN
Status: DISCONTINUED | OUTPATIENT
Start: 2023-07-02 | End: 2023-07-04 | Stop reason: HOSPADM

## 2023-07-02 RX ORDER — OXYTOCIN 10 [USP'U]/ML
10 INJECTION, SOLUTION INTRAMUSCULAR; INTRAVENOUS
Status: DISCONTINUED | OUTPATIENT
Start: 2023-07-02 | End: 2023-07-04 | Stop reason: HOSPADM

## 2023-07-02 RX ADMIN — KETOROLAC TROMETHAMINE 30 MG: 30 INJECTION, SOLUTION INTRAMUSCULAR; INTRAVENOUS at 13:42

## 2023-07-02 RX ADMIN — PENICILLIN G 3 MILLION UNITS: 3000000 INJECTION, SOLUTION INTRAVENOUS at 05:22

## 2023-07-02 RX ADMIN — LIDOCAINE HYDROCHLORIDE 20 ML: 10 INJECTION, SOLUTION EPIDURAL; INFILTRATION; INTRACAUDAL; PERINEURAL at 11:41

## 2023-07-02 RX ADMIN — ACETAMINOPHEN 650 MG: 325 TABLET, FILM COATED ORAL at 19:58

## 2023-07-02 RX ADMIN — Medication 2 MILLI-UNITS/MIN: at 09:34

## 2023-07-02 RX ADMIN — PENICILLIN G 3 MILLION UNITS: 3000000 INJECTION, SOLUTION INTRAVENOUS at 01:18

## 2023-07-02 RX ADMIN — SODIUM CHLORIDE, POTASSIUM CHLORIDE, SODIUM LACTATE AND CALCIUM CHLORIDE: 600; 310; 30; 20 INJECTION, SOLUTION INTRAVENOUS at 08:57

## 2023-07-02 RX ADMIN — SODIUM CHLORIDE, POTASSIUM CHLORIDE, SODIUM LACTATE AND CALCIUM CHLORIDE: 600; 310; 30; 20 INJECTION, SOLUTION INTRAVENOUS at 01:15

## 2023-07-02 RX ADMIN — PENICILLIN G 3 MILLION UNITS: 3000000 INJECTION, SOLUTION INTRAVENOUS at 09:06

## 2023-07-02 RX ADMIN — IBUPROFEN 800 MG: 800 TABLET, FILM COATED ORAL at 19:58

## 2023-07-02 RX ADMIN — ACETAMINOPHEN 650 MG: 325 TABLET, FILM COATED ORAL at 23:49

## 2023-07-02 ASSESSMENT — ACTIVITIES OF DAILY LIVING (ADL)
ADLS_ACUITY_SCORE: 18

## 2023-07-02 NOTE — PLAN OF CARE
Goal Outcome Evaluation:      Plan of Care Reviewed With: patient    Overall Patient Progress: no changeOverall Patient Progress: no change     Pt admitted for IOL for LGA. RN assumed care at 2300. VSS, EFM charted (see flowsheet), pt afebrile. Pt denies headache, vision changes, RUQ pain, bleeding. Pt comfortable with epidural in bed. Anticipate . Pt stated she has no questions or concerns with plan of care at this time. Call light within reach. Report given to Marcia MCKEON.

## 2023-07-02 NOTE — ANESTHESIA PROCEDURE NOTES
"Epidural catheter Procedure Note    Pre-Procedure   Staff -        Anesthesiologist:  Milena Arce MD       Resident/Fellow: Rahul Enriquez MD       Performed By: resident       Location: OB       Procedure Start/Stop Times: 7/1/2023 8:51 PM       Pre-Anesthestic Checklist: patient identified, IV checked, risks and benefits discussed, informed consent, monitors and equipment checked, pre-op evaluation, at physician/surgeon's request and post-op pain management  Timeout:       Correct Patient: Yes        Correct Procedure: Yes        Correct Site: Yes        Correct Position: Yes   Procedure Documentation  Procedure: epidural catheter       Diagnosis: labor pain       Patient Position: sitting       Patient Prep/Sterile Barriers: sterile gloves, mask, patient draped       Skin prep: Chloraprep       Local skin infiltrated with mL of 1% lidocaine.        Insertion Site: L3-4. (midline approach).       Technique: LORT saline        JOCE at 7.5 cm.       Needle Type: ToConkwesty needle       Needle Gauge: 17.        Needle Length (Inches): 3.5        Catheter: 19 G.          Catheter threaded easily.           Threaded 13 cm at skin.         # of attempts: 1 and  # of redirects:  2    Assessment/Narrative         Paresthesias: No.       Test dose of 3 mL lidocaine 1.5% w/ 1:200,000 epinephrine at.         Test dose negative, 3 minutes after injection, for signs of intravascular, subdural, or intrathecal injection.       Insertion/Infusion Method: LORT saline       Aspiration negative for Heme or CSF via Epidural Catheter.    Medication(s) Administered   0.125% bupivacaine 5 mL + fentanyl 20 mcg + NS 5 mL (Epidural) (Mixture components: bupivacaine HCl (PF) 0.25 % Soln, 5 mL; fentaNYL (PF) 100 MCG/2ML Soln, 20 mcg; sodium chloride 0.9 % Soln, 5 mL) - EPIDURAL   10 mL - 7/1/2023 8:51:00 PM    FOR [facility] ([facility] ONLY:   Pain Team Contact information: please page the Pain Team Via [employer]. Search \"Pain\". During " daytime hours, please page the attending first. At night please page the resident first.

## 2023-07-02 NOTE — PLAN OF CARE
Goal Outcome Evaluation:    Shift 3pm-7pm    Data: Vital signs within normal limits. Postpartum checks within normal limits - see flow record. Patient eating and drinking normally. Patient able to empty bladder independently and is up ambulating in room and to bathroom. . No apparent signs of infection. perineum healing well. Patient performing self cares and is able to care for infant with assistance from nurse/staff.  Action: Patient medicated during the shift for pain. See MAR. Patient reassessed within 1 hour after each medication and pain was improved - patient stated she was comfortable. Patient education done about breastfeeding. Lactation consult was released.  Social work consult was also released.  See flow record.  Response: Positive attachment behaviors observed with infant. Support persons was not  present.   Plan: Anticipate discharge.

## 2023-07-02 NOTE — PLAN OF CARE
Received report from Evonne Maynard RN at 2320. Evonne MCKEON states that pt reports getting initial 5 million unit dose of penicillin prior to Evonne RNs shift, but was not documented. Pt has since received a dose of penicillin 3 million units (see MAR).

## 2023-07-02 NOTE — PROGRESS NOTES
S: Mally is now comfortable with her epidural. Contractions had gotten very intense. She is relaxing and hoping for a nap. [facility] had to go back home, so Mally is by herself.    O:  Blood pressure 128/84, temperature 97.8  F (36.6  C), temperature source Oral, resp. rate 17, last menstrual period 10/01/2022, SpO2 96 %, not currently breastfeeding.  General appearance: comfortable    CONTACTIONS: Contractions every 1-4 minutes.  Palpate: moderate  FETAL HEART TONES: baseline 130 with moderate FHR variability and    accelerations yes. Decelerations no.    NST: REACTIVE  ROM: not ruptured  PELVIC EXAM: PELVIC EXAM: / Anterior/ soft/ -2 possible LOP  Fetal Position:  cephalic  Bloody show: Yes   Pitocin- none,  Antibiotics- PCN  Cervical ripening: s/p oral misoprostol    ASSESSMENT:  ==============  IUP @ 39w0d active labor   induction of labor for severe obesity and concern for LGA fetus  Fetal Heart rate tracing category one  GBS- positive     PLAN:  ===========  comfort measures prn   Pain medication -continue epidural per anesthesia orders  Prophylactic antibiotic for + GBS status  Anticipate   reevaluate in 2-4 hours/PRN     Denver Krueger CNM

## 2023-07-02 NOTE — PROVIDER NOTIFICATION
07/02/23 0640   Provider Notification   Provider Name/Title AIMEE Krueger CNM   Method of Notification Electronic Page   Request Evaluate - Remote   Notification Reason Maternal Vital Sign Change     FYI pt last temp 99.7

## 2023-07-02 NOTE — PROGRESS NOTES
S:  Mally was able to rest over night. Feeling a lot of rectal pressure.     O:  /64 (BP Location: Left arm, Patient Position: Left side, Cuff Size: Adult Large)   Temp 99.7  F (37.6  C) (Oral)   Resp 18   LMP 10/01/2022 (Exact Date)   SpO2 99%   Breastfeeding No   FHT: baseline: 145; variability: moderate; accels: yes; decels: None; ctx: q 2-4 min    Labor Course:  23  admit for IOL  1155 SVE 1/50/-2, oral miso  1332 oral miso  1526 oral miso  1 epidural placement  2300 SVE 6/90/-2    23  0100 AROM clear fluid  0500 SVE 9/100/0  0740 SVE anterior lip/+1, fetus feels TIMUR by Leopold's      A:   at 39w1d, here for IOL for suspected LGA fetus  Labor status: active labor  GBS pos, PCN adequately treated  Fetus Cat I  COVID-19 neg    P:  Offered Pitocin augmentation due to little change in last 2.5 hours. Discussed risks and benefits. She would like to think about it for next 30 min and will consider    ROSENDO Foster CNM

## 2023-07-02 NOTE — PROGRESS NOTES
S: Mally is resting between cares, still getting good pain control with her epidural. Starting to feel intermittent pressure.      O:  Blood pressure 126/60, temperature 98.4  F (36.9  C), temperature source Oral, resp. rate 18, last menstrual period 10/01/2022, SpO2 100 %, not currently breastfeeding.  General appearance: comfortable    CONTACTIONS: Contractions every 2-4 minutes.  Palpate: moderate  FETAL HEART TONES: baseline 135 with moderate FHR variability and    accelerations yes. Decelerations no.    ROM: clear fluid  PELVIC EXAM: PELVIC EXAM: 9/ 100%/ Anterior/ soft/ 0   Fetal Position:  cephalic  Bloody show: Yes   Pitocin- none,  Antibiotics- PCN  Cervical ripening: N/A    ASSESSMENT:  ==============  IUP @ 39w1d active labor and good progress   Severe obesity  Concern for LGA fetus  Fetal Heart rate tracing category one  GBS- positive; adequately treated     PLAN:  ===========  comfort measures prn   Pain medication -continue epidural per anesthesia orders  Prophylactic antibiotic for + GBS status  Anticipate   reevaluate in 2-4 hours/PRN   Continue frequent position changes to encourage fetal descent    Denver Krueger CNM

## 2023-07-02 NOTE — PLAN OF CARE
Patient arrived to [facility] unit via wheelchair with belongings, accompanied by RN, with infant in arms. Got report from LINDA Camilo and checked bands. Unit and room oriented complete. No concerns at this time. Continue with plan of care.

## 2023-07-02 NOTE — PLAN OF CARE
Goal Outcome Evaluation:  Data: Mally Evans transferred to [id number] via wheelchair at 1340. Baby transferred via parent's arms. Running pitocin IV. Voided. Toradol IV given (SEE MAR).   Action: Receiving unit notified of transfer: Yes. Patient and family notified of room change. Report given to Lyudmila MCKEON. Belongings sent to receiving unit. Accompanied by Registered Nurse. Oriented patient to surroundings. Call light within reach. ID bands double-checked with receiving RN.  Response: Patient tolerated transfer and is stable. FOB is in house arrest; planning to come tomorrow to visit on his permitted hours.

## 2023-07-02 NOTE — PLAN OF CARE
Goal Outcome Evaluation:  Delivery at 1100 baby boy, not respiratory effort was observed and NICU was called right after delivery through infant call blue bottom. Pt doing well.  Pain is well-controlled.  No fevers.  No history of foul-smelling vaginal discharge.  Good appetite.  Denies chest pain, shortness of breath, nausea or vomiting.  Vaginal bleeding is similar to a heavy menstrual flow.  Breastfeeding well. Running pitocin IV.

## 2023-07-02 NOTE — PROVIDER NOTIFICATION
07/02/23 0028   Provider Notification   Provider Name/Title AIMEE Krueger CNM   Method of Notification Electronic Page   Request Evaluate in Person   Notification Reason SVE     Attempted straight cath, unable to advance past baby's head. Received in report repeat SVE at 0000, so will attempt straight cath again immediately prior to SVE.

## 2023-07-02 NOTE — PROGRESS NOTES
S:  Mally started pushing at 0855 and has been pushing with excellent effort using stirrups for support. She has done both closed knee and open knee positions. Plan to rotate to side lying positions next. Partner Kimberly is on Facetime.    After 30 minutes of pushing, counseled about pushing for more sustained pushes. Mally independently requests Pitocin augmentation, which she previously declined. CNM agrees, plan to start Pitocin augmentation    O:  /67 (BP Location: Left arm, Patient Position: Semi-Burgos's, Cuff Size: Adult Large)   Temp 99.9  F (37.7  C) (Oral)   Resp 24   LMP 10/01/2022 (Exact Date)   SpO2 98%   Breastfeeding No   FHT: baseline: 145; variability: moderate; accels: yes; decels: intermittent variable decels; ctx: q 2-4 min    Labor Course:  23  admit for IOL  1155 SVE 1/50/-2, oral miso  1332 oral miso  1526 oral miso  2051 epidural placement  2300 SVE 6/90/-2     23  0100 AROM clear fluid  0500 SVE 9/100/0  0740 SVE anterior lip/+1, fetus feels TIMUR by Leopold's  0850 SVE +1 with contraction, small anterior lip reduced with several pushes    A:   at 39w1d, here for IOL for suspected LGA fetus  Labor status: active labor, second stage  GBS pos, PCN adequately treated  Fetus Cat II without concern for fetal acidemia due to moderate variability and accels  COVID-19 neg    P:  Plan to start Pitocin augmentation  Continue pushing with position changes, anticipate     Kathleen Guzmán, ROSENDO FLORES
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
## 2023-07-02 NOTE — PROGRESS NOTES
S: Mally is still comfortable with epidural. RN was unable to straight cath passed baby's head. Able to drain bladder with red landry for 250cc. Mally is resting, was able to get some sleep. Consents to SVE.    O:  Blood pressure 105/57, temperature 98.4  F (36.9  C), temperature source Oral, resp. rate 18, last menstrual period 10/01/2022, SpO2 98 %, not currently breastfeeding.  General appearance: comfortable  CONTACTIONS: Contractions every 1-4 minutes.  Palpate: moderate  FETAL HEART TONES: baseline 135 with moderate FHR variability and    accelerations yes. Decelerations no.    ROM: clear fluid  PELVIC EXAM: PELVIC EXAM: / Anterior/ soft/ -1 minimal change  Fetal Position:  cephalic  Bloody show: Yes   Pitocin- none,  Antibiotics- PCN  Cervical ripening: N/A    ASSESSMENT:  ==============  IUP @ 39w1d active labor and minimal/no progress   Severe obesity  Concern for LGA baby with AC >99  Fetal Heart rate tracing category one  GBS- positive  Clear fluid on SROM     PLAN:  ===========  comfort measures prn   Pain medication-continue epidural per anesthesia orders  Prophylactic antibiotic for + GBS status  Anticipate   reevaluate in 2-4 hours/PRN   Minimal change on SVE, offered AROM vs pitocin. Mally would prefer AROM at this time. AROM performed for small amount of clear amniotic fluid. Fetal head well applied to cervix.     Denver Krueger CNM
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
## 2023-07-02 NOTE — L&D DELIVERY NOTE
OB Vaginal Delivery Note    Mally Evans MRN# [id number]   Age: 27 year old YOB: 1995     Labor Story:  [facility] for induction of labor for suspected LGA fetus and maternal obesity. Labor was induced with oral miso and then she began homero and laboring spontaneously. FHT was monitored with Arina CEFM monitor and FHT was Cat I throughout labor. IV penicillin given for treatment of GBS bacteria. Mally received epidural for pain support. At 0740 she was found to have anterior lip. After an hour of laboring down, lip was still present but fetal station +1. With several pushes, lip was reduced. She began pushing at 0855. Mally's boyfriend and father of baby, Kimberly, was present on Facetime video chat, as he was unable to be at the hospital for birth. Mally pushed with excellent effort. After the first 30 min of pushing she agreed to Pitocin augmentation to assist with pushing efforts due to little change in fetal station. During second stage, monitoring changed to FSE due to difficulty tracing fetal heart rate with Arina monitor. During last 30 min of second stage variable decels noted with contractions but FHT had moderate variability and accels until delivery. She continued pushing with good effort. At 1055, fetal head began . Baby's head was born in TIMUR presentation between contractions. Double nuchal cord was reduced. Shoulders delivered shortly after with next contraction. Immediately upon birth, baby was noted to have poor tone and purple skin color with little respiratory effort. NICU called to room. Cord was immediately clamped and cut by CNM and baby taken to warmer. See NICU note for details. Baby was returned to [facility] after care from NICU. Cord segment collected for cord gases and cord blood. Placenta delivered with ease and found to be intact. Fundus firm and bleeding stable. No hemorrhage medications given per patient request and bleeding stable. Perineum  inspected and second degree lac repaired in usual fashion. Mally is joyful that baby is born and is holding him skin to skin. It was a pleasure to attend this birth.      GA: 39w1d  GP:   Labor Complications: None   EBL:   mL  Delivery QBL:    Delivery Type: Vaginal, Spontaneous   ROM to Delivery Time: (Delivered) Hours: 10 Minutes: 15  Thomson Weight: 3.91 kg (8 lb 9.9 oz)    1 Minute 5 Minute 10 Minute   Apgar Totals: 4   9        CHLOE KIM     Delivery Details:  Mally Evans, a 27 year old  female delivered a viable infant with apgars of 4  and 9 . Patient was fully dilated and pushing after   hours   minutes in active labor. Delivery was via vaginal, spontaneous  to a sterile field under epidural  anesthesia. Infant delivered in vertex  left  occiput  anterior  position. Anterior and posterior shoulders delivered without difficulty. The cord was clamped, cut twice and 3 vessels  were noted. Cord blood was obtained in routine fashion with the following disposition: lab .      Cord complications: none   Placenta delivered at 2023 11:04 AM . Placental disposition was Hospital disposal . Fundal massage performed and fundus found to be firm.     Episiotomy: none    Perineum, vagina, cervix were inspected, and the following lacerations were noted:   Perineal lacerations: 2nd                Any lacerations were repaired in the usual fashion using 3-0 Vicryl.    Excellent hemostasis was noted. Needle count correct. Infant and patient in delivery room in good and stable condition.        Adalberto Evans-Mally [[id number]]    Labor Event Times    Active labor onset date: 23 Onset time:  9:07 PM CDT   Dilation complete date: 23 Complete time:  9:05 AM   Start pushing date/time: 2023 0850      Labor Events     labor?: No   steroids: None  Labor Type: Induction/Cervical ripening, Augmentation  Predominate monitoring during 1st stage: continuous electronic fetal  monitoring     Antibiotics received during labor?: Yes  Reason for Antibiotics: GBS  Antibiotics received for GBS: Penicillin  Antibiotics Given (GBS): Greater than 4 hours prior to delivery     Rupture identifier: Sac 1  Rupture date/time: 7/2/23 0045   Rupture type: Artificial Rupture of Membranes  Fluid color: Clear  Fluid odor: Normal     Induction: Misoprostol, AROM  Induction date/time: 7/1/23     Cervical ripening date/time: 7/1/23 1332    Indications for induction: Obesity, Fetal Indications or Congenital Malformations (Comment to specify)     Augmentation: Oxytocin  Augmentation date/time: 7/2/23 0934   Indications for augmentation: Ineffective Contraction Pattern     Delivery/Placenta Date and Time    Delivery Date: 7/2/23 Delivery Time: 11:00 AM   Placenta Date/Time: 7/2/2023 11:04 AM  Oxytocin given at the time of delivery: after delivery of baby  Delivering clinician: Kathleen Guzmán APRN CNM   Other personnel present at delivery:  Provider Role   Ton Menard RN Delivery Nurse         Vaginal Counts     Initial count performed by 2 team members:  Two Team Members   SANDRA Nava RN       [place] Suture Needles Sponges (RETIRED) Instruments   Initial counts 2 1 5    Added to count       Relief counts       Final counts             Placed during labor Accounted for at the end of labor   FSE Yes Yes   IUPC No No   Cervidil No No              Final count performed by 2 team members:  Two Team Members   SANDRA Menard RN      Final count correct?: Yes  Pre-Birth Team Brief: Complete  Post-Birth Team Debrief: Complete     Apgars    Living status: Living   1 Minute 5 Minute 10 Minute 15 Minute 20 Minute   Skin color: 0  1       Heart rate: 2  2       Reflex irritability: 1  2       Muscle tone: 1  2       Respiratory effort: 0  2       Total: 4  9       Apgars assigned by: MYLA ALLEN     Cord    Vessels: 3 Vessels    Cord  "Complications: None               Cord Blood Disposition: Lab    Gases Sent?: Yes    Delayed cord clamping?: No    Stem cell collection?: No        Resuscitation    Methods: NCPAP, Gary Puff, Oxygen  [place] Care at Delivery: Code blue called overhead. Arrived with baby on warmer at 1 minute of life with no respiratory effort and heart rate >100. Started PPV via gary puff x 30 seconds 20/5 fi02 30% and pulse oximeter applied. Stopped PPV for improved respiratory effort and continued CPAP +5 40% and suctioned mouth for secretions. At 7 minutes of life CPAP removed with excellent respiratory effort, clearing lung sounds and saturations 97%. Infant skin to skin and nurse to resume care. Gross physical exam within normal limits.     This resuscitation and all procedures were performed by this provider/author of this note.   ROSENDO Lewis, CNP-BC 2023 11:17 AM    Output in Delivery Room: Stool      Measurements    Weight: 8 lb 9.9 oz Length: 1' 10.75\"   Head circumference: 33 cm    Output in delivery room: Stool     Skin to Skin and Feeding Plan    Skin to skin initiation date/time: 1841    Skin to skin with: Mother  Skin to skin end date/time:        Labor Events and Shoulder Dystocia    Fetal Tracing Prior to Delivery: Category 2  Shoulder dystocia present?: Neg     Delivery (Maternal) (Provider to Complete) ([id number])    Episiotomy: None  Perineal lacerations: 2nd    Repair suture: 3-0 Vicryl  Genital tract inspection done: Pos     Blood Loss  Mother: Mally Evans #[id number]   Start of Mother's Information    Delivery Blood Loss  23 2107 - 23 1148    None           End of Mother's Information  Mother: Mally Evans #[id number]          Delivery - Provider to Complete ([id number])    Delivering clinician: Kathleen Guzmán APRN CNM  Delivery Type (Choose the 1 that will go to the Birth History): Vaginal, Spontaneous                   Other personnel:  Provider Role "   Ton Menard, RN Delivery Nurse                Placenta    Date/Time: 7/2/2023 11:04 AM  Removal: Spontaneous  Disposition: Hospital disposal           Anesthesia    Method: Epidural  Cervical dilation at placement: 0-3                Presentation and Position    Presentation: Vertex    Position: Left Occiput Anterior                 Renetta Cooley APRN CNM

## 2023-07-02 NOTE — PLAN OF CARE
Goal Outcome Evaluation:    Patient and nurse developed a safe phrase if she feels unsafe and wants visitors removed. See below    Safe phrase is:  I AM HUNGRY, CAN YOU HAVE MY NURSE BRING ME SOME PEANUT BUTTER CRACKERS,

## 2023-07-02 NOTE — PLAN OF CARE
"Goal Outcome Evaluation:      Plan of Care Reviewed With: patient    Overall Patient Progress: improvingOverall Patient Progress: improving         VSS and afebrile. Pt coping through contractions awaiting epidural. Anesthesia at bedside at 2022. Pt tolerated placement well and has good coverage for pain management. SVE by CNM at 2100 was 6/90/-2. Penicillin for GBS+ status given as ordered. Following penicillin administration pt noted considerable pain at the IV sight and above described as \"burning and tightness.\" IV sight was removed from this location. New IV started on pts left hand. Pt is tolerating fluids in this hand. See Flowsheets for FHR and uterine assessment. Report given to LINDA Pacheco.      "

## 2023-07-03 LAB — HGB BLD-MCNC: 8.7 G/DL (ref 11.7–15.7)

## 2023-07-03 PROCEDURE — 120N000002 HC R&B MED SURG/OB UMMC

## 2023-07-03 PROCEDURE — 85018 HEMOGLOBIN: CPT | Performed by: ADVANCED PRACTICE MIDWIFE

## 2023-07-03 PROCEDURE — 36415 COLL VENOUS BLD VENIPUNCTURE: CPT | Performed by: ADVANCED PRACTICE MIDWIFE

## 2023-07-03 PROCEDURE — 250N000013 HC RX MED GY IP 250 OP 250 PS 637: Performed by: ADVANCED PRACTICE MIDWIFE

## 2023-07-03 RX ORDER — IBUPROFEN 600 MG/1
600 TABLET, FILM COATED ORAL EVERY 6 HOURS PRN
Qty: 60 TABLET | Refills: 1 | Status: SHIPPED | OUTPATIENT
Start: 2023-07-03 | End: 2023-08-02

## 2023-07-03 RX ORDER — NALOXONE HYDROCHLORIDE 1 MG/ML
0.2 INJECTION INTRAMUSCULAR; INTRAVENOUS; SUBCUTANEOUS
Status: DISCONTINUED | OUTPATIENT
Start: 2023-07-03 | End: 2023-07-04 | Stop reason: HOSPADM

## 2023-07-03 RX ORDER — AMOXICILLIN 250 MG
1 CAPSULE ORAL DAILY
Qty: 30 TABLET | Refills: 0 | Status: SHIPPED | OUTPATIENT
Start: 2023-07-03 | End: 2023-08-02

## 2023-07-03 RX ORDER — PRENATAL VIT/IRON FUM/FOLIC AC 27MG-0.8MG
1 TABLET ORAL DAILY
Qty: 90 TABLET | Refills: 3 | Status: SHIPPED | OUTPATIENT
Start: 2023-07-03 | End: 2023-08-18

## 2023-07-03 RX ORDER — ASCORBIC ACID 250 MG
250 TABLET,CHEWABLE ORAL EVERY OTHER DAY
Status: DISCONTINUED | OUTPATIENT
Start: 2023-07-04 | End: 2023-07-04 | Stop reason: HOSPADM

## 2023-07-03 RX ORDER — NALOXONE HYDROCHLORIDE 1 MG/ML
0.4 INJECTION INTRAMUSCULAR; INTRAVENOUS; SUBCUTANEOUS
Status: DISCONTINUED | OUTPATIENT
Start: 2023-07-03 | End: 2023-07-04 | Stop reason: HOSPADM

## 2023-07-03 RX ORDER — FERROUS GLUCONATE 324(38)MG
324 TABLET ORAL EVERY OTHER DAY
Status: DISCONTINUED | OUTPATIENT
Start: 2023-07-04 | End: 2023-07-04 | Stop reason: HOSPADM

## 2023-07-03 RX ORDER — LANOLIN ALCOHOL/MO/W.PET/CERES
1000 CREAM (GRAM) TOPICAL EVERY OTHER DAY
Status: DISCONTINUED | OUTPATIENT
Start: 2023-07-04 | End: 2023-07-04 | Stop reason: HOSPADM

## 2023-07-03 RX ORDER — ACETAMINOPHEN 325 MG/1
650 TABLET ORAL EVERY 6 HOURS PRN
Qty: 60 TABLET | Refills: 0 | Status: SHIPPED | OUTPATIENT
Start: 2023-07-03 | End: 2023-08-02

## 2023-07-03 RX ADMIN — ACETAMINOPHEN 650 MG: 325 TABLET, FILM COATED ORAL at 13:26

## 2023-07-03 RX ADMIN — IBUPROFEN 800 MG: 800 TABLET, FILM COATED ORAL at 18:24

## 2023-07-03 RX ADMIN — ACETAMINOPHEN 650 MG: 325 TABLET, FILM COATED ORAL at 18:24

## 2023-07-03 RX ADMIN — DOCUSATE SODIUM 100 MG: 100 CAPSULE, LIQUID FILLED ORAL at 08:43

## 2023-07-03 RX ADMIN — ACETAMINOPHEN 650 MG: 325 TABLET, FILM COATED ORAL at 05:40

## 2023-07-03 RX ADMIN — IBUPROFEN 800 MG: 800 TABLET, FILM COATED ORAL at 02:27

## 2023-07-03 RX ADMIN — IBUPROFEN 800 MG: 800 TABLET, FILM COATED ORAL at 08:43

## 2023-07-03 ASSESSMENT — ACTIVITIES OF DAILY LIVING (ADL)
ADLS_ACUITY_SCORE: 18

## 2023-07-03 NOTE — PLAN OF CARE
Goal Outcome Evaluation:      Plan of Care Reviewed With: patient    Overall Patient Progress: improvingOverall Patient Progress: improving     AFVSS. Postpartum assessments WDL. Patient is ambulating and voiding without difficulty. Fundus firm with scant lochia. Medicated with ibuprofen and tylenol for perineal and back pain, states decrease in pain. Also using aqua K pad for back pain/soreness. Independent with self cares, able to care for baby with some assistance from RN.Patient is breastfeeding with minimal assistance with cues for deeper latch. Positive bonding behaviors observed with mother and baby. Continue to provide support and education. Continue present cares.

## 2023-07-03 NOTE — PROVIDER NOTIFICATION
07/03/23 1600   Provider Notification   Provider Name/Title isis avery   Method of Notification Electronic Page   Request Evaluate-Remote   Notification Reason Status Update     Patient would like to stay overnight and be the 11am discharge tommorrow.
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
## 2023-07-03 NOTE — LACTATION NOTE
This note was copied from a baby's chart.  Follow Up Consult    Infant Name: Shabbir    Infant's Primary Care Clinic: unsure, maybe [facility] or [facility]    Maternal Assessment: Mally (mom) comfortable.  No pain with breastfeeding.      Infant Assessment:  Shabbir has age appropriate output and weight loss.      Weight Change Since Birth: weight not checked since birth, baby <24 hours old      Feeding History: latching at breast with minimal assist      Feeding Assessment: Shabbir started nursing about an hour ago and has been latching and detaching on his own.  Mally has been changing positions and sides to accommodate Shabbir.     Education:  Reviewed postpartum book including: expected  breastfeeding patterns in the first few days (pg. 38 of Your Guide to To Postpartum and [facility]), what a good latch looks like, position guide, feeding log, expected output and how to know if baby is getting enough    Handouts: Infant Feeding Log (Week 1, Your Guide to Postpartum & [facility] Book) and [facility] Lactation Resources    Plan: Continue to do skin to skin, feeding goal of 8-12 times every 24 hours, hand express after feedings (moore cup given to Mally)    Encouraged follow up with outpatient lactation consultant  as needed after discharge. Mally is aware of lactation help as an outpatient at both the [facility] and [place] [facility] locations.       Cori Boothe RN, IBCLC   Lactation Consultant  Ascom: *[phone]  Office: [phone]
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
## 2023-07-04 VITALS
HEART RATE: 81 BPM | DIASTOLIC BLOOD PRESSURE: 80 MMHG | RESPIRATION RATE: 18 BRPM | TEMPERATURE: 98.2 F | OXYGEN SATURATION: 98 % | SYSTOLIC BLOOD PRESSURE: 133 MMHG

## 2023-07-04 PROCEDURE — 250N000013 HC RX MED GY IP 250 OP 250 PS 637: Performed by: ADVANCED PRACTICE MIDWIFE

## 2023-07-04 RX ADMIN — DOCUSATE SODIUM 100 MG: 100 CAPSULE, LIQUID FILLED ORAL at 10:58

## 2023-07-04 RX ADMIN — IBUPROFEN 800 MG: 800 TABLET, FILM COATED ORAL at 01:44

## 2023-07-04 RX ADMIN — ACETAMINOPHEN 650 MG: 325 TABLET, FILM COATED ORAL at 06:47

## 2023-07-04 RX ADMIN — CYANOCOBALAMIN TAB 1000 MCG 1000 MCG: 1000 TAB at 10:58

## 2023-07-04 RX ADMIN — FERROUS GLUCONATE 324 MG: 324 TABLET ORAL at 10:58

## 2023-07-04 RX ADMIN — ACETAMINOPHEN 650 MG: 325 TABLET, FILM COATED ORAL at 01:44

## 2023-07-04 ASSESSMENT — ACTIVITIES OF DAILY LIVING (ADL)
ADLS_ACUITY_SCORE: 18
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
## 2023-07-04 NOTE — PLAN OF CARE
Goal Outcome Evaluation:    Data: Vital signs within normal limits. Postpartum checks within normal limits - see flow record. Patient eating and drinking normally. Patient able to empty bladder independently and is up ambulating. No apparent signs of infection. Perineum healing well. Patient performing self cares and is able to care for infant.  Infant is breastfeeding.  Action: Patient medicated during the shift for pain and cramping. See MAR. Patient reassessed within 1 hour after each medication and pain was improved - patient stated she was comfortable. Patient education done about self care and breastfeeding. See flow record.  Response: Positive attachment behaviors observed with infant. Support persons was present for some time this afternoon for a visit.   Plan: Anticipate discharge Tuesday  .

## 2023-07-04 NOTE — LACTATION NOTE
Brief Lactation Consult    Check in pre-discharge; mom is good spirits, reports babe eating well, teaching done, is aware of resources in the community, no questions/ concerns/ needs.      Carmina Liang, RNC-LUCINA, IBCLC   Lactation Consultant  Ascom: *[id number]  Office: [phone]

## 2023-07-04 NOTE — PLAN OF CARE
Goal Outcome Evaluation:          Plan of Care Reviewed With: patient      Overall Patient Progress:      VSS. Pt taking  ibuprofen & tylenol for pain. Pt up in room independently. Breastfeeding well. encouraged waking baby and feeding every 3 hour. Bonding well with . Continue with plan of care.

## 2023-07-04 NOTE — PLAN OF CARE
Data: Vital signs within normal limits. Postpartum checks within normal limits - see flow record. Patient eating and drinking normally. Patient able to empty bladder independently and is up ambulating. No apparent signs of infection. Perineum healing well. Patient performing self cares and is able to care for infant.  Patient is breastfeeding. paitent was given a pump and discharge medications.  Patient denies further questions and concerns.  Action: Patient medicated during the shift for pain and cramping. See MAR. Patient reassessed within 1 hour after each medication and pain was improved - patient stated she was comfortable. Patient education done about discharge instructions. See flow record.  Response: Positive attachment behaviors observed with infant. Support persons was not present.   Plan: Anticipate discharge today    .Goal Outcome Evaluation:

## 2023-07-04 NOTE — DISCHARGE INSTRUCTIONS

## 2023-07-07 ENCOUNTER — TELEPHONE (OUTPATIENT)
Dept: BEHAVIORAL HEALTH | Facility: CLINIC | Age: 28
End: 2023-07-07
Payer: COMMERCIAL

## 2023-07-07 NOTE — TELEPHONE ENCOUNTER
Called patient for brief mood/anxiety check in after patient delivered her baby on 7/2. Patient stated she is doing well and is in agreement to follow up at two weeks postpartum. Scheduled appointment on 7/17.

## 2023-07-17 ENCOUNTER — VIRTUAL VISIT (OUTPATIENT)
Dept: BEHAVIORAL HEALTH | Facility: CLINIC | Age: 28
End: 2023-07-17
Attending: OBSTETRICS & GYNECOLOGY
Payer: COMMERCIAL

## 2023-07-17 DIAGNOSIS — F41.1 GENERALIZED ANXIETY DISORDER: ICD-10-CM

## 2023-07-17 DIAGNOSIS — F34.1 PERSISTENT DEPRESSIVE DISORDER: Primary | ICD-10-CM

## 2023-07-17 PROCEDURE — 90834 PSYTX W PT 45 MINUTES: CPT | Mod: 93

## 2023-07-17 ASSESSMENT — PATIENT HEALTH QUESTIONNAIRE - PHQ9
SUM OF ALL RESPONSES TO PHQ QUESTIONS 1-9: 3
SUM OF ALL RESPONSES TO PHQ QUESTIONS 1-9: 3
10. IF YOU CHECKED OFF ANY PROBLEMS, HOW DIFFICULT HAVE THESE PROBLEMS MADE IT FOR YOU TO DO YOUR WORK, TAKE CARE OF THINGS AT HOME, OR GET ALONG WITH OTHER PEOPLE: SOMEWHAT DIFFICULT

## 2023-07-17 ASSESSMENT — ANXIETY QUESTIONNAIRES
1. FEELING NERVOUS, ANXIOUS, OR ON EDGE: SEVERAL DAYS
GAD7 TOTAL SCORE: 5
8. IF YOU CHECKED OFF ANY PROBLEMS, HOW DIFFICULT HAVE THESE MADE IT FOR YOU TO DO YOUR WORK, TAKE CARE OF THINGS AT HOME, OR GET ALONG WITH OTHER PEOPLE?: SOMEWHAT DIFFICULT
6. BECOMING EASILY ANNOYED OR IRRITABLE: SEVERAL DAYS
IF YOU CHECKED OFF ANY PROBLEMS ON THIS QUESTIONNAIRE, HOW DIFFICULT HAVE THESE PROBLEMS MADE IT FOR YOU TO DO YOUR WORK, TAKE CARE OF THINGS AT HOME, OR GET ALONG WITH OTHER PEOPLE: SOMEWHAT DIFFICULT
5. BEING SO RESTLESS THAT IT IS HARD TO SIT STILL: SEVERAL DAYS
2. NOT BEING ABLE TO STOP OR CONTROL WORRYING: NOT AT ALL
4. TROUBLE RELAXING: SEVERAL DAYS
7. FEELING AFRAID AS IF SOMETHING AWFUL MIGHT HAPPEN: NOT AT ALL
GAD7 TOTAL SCORE: 5
GAD7 TOTAL SCORE: 5
3. WORRYING TOO MUCH ABOUT DIFFERENT THINGS: SEVERAL DAYS
7. FEELING AFRAID AS IF SOMETHING AWFUL MIGHT HAPPEN: NOT AT ALL

## 2023-07-18 PROBLEM — M25.532 PAIN IN BOTH WRISTS: Status: RESOLVED | Noted: 2023-05-17 | Resolved: 2023-07-18

## 2023-07-18 PROBLEM — M25.531 PAIN IN BOTH WRISTS: Status: RESOLVED | Noted: 2023-05-17 | Resolved: 2023-07-18

## 2023-07-20 ENCOUNTER — PATIENT OUTREACH (OUTPATIENT)
Dept: CARE COORDINATION | Facility: CLINIC | Age: 28
End: 2023-07-20
Payer: COMMERCIAL

## 2023-07-20 NOTE — PROGRESS NOTES
Clinic Care Coordination Contact  [facility]/Voicemail    Chart Review: Referral from AMBER Bullock, on 7/18/23:    Financial support - Pt is two weeks postpartum, hoping to explore options for affordable childcare.     Clinical Data: Care Coordinator Outreach  Outreach attempted x 1.  Spoke briefly with pt who was driving at the time of this call. She requests CHW call her anytime tomorrow.  Plan:  Care Coordinator will call pt tomorrow, 7/21/23, anytime during the day, per pt request.    Lucretia Sahu  Community Health Worker  [facility]  [phone]]'

## 2023-07-20 NOTE — LETTER
[facility] CARE COORDIN[facility]  [address], [address]  [place] [address]    July 24, 2023    Mally Evans  [address]  [facility] [address]        Dear Mally,    I am a  clinic community health worker who works with AMBER Bullock, with the [facility]. I wanted to introduce myself and provide you with my contact information for you to be able to call me with any questions or concerns. Below is a description of clinic care coordination and how I can further assist you.       The clinic care coordination team is made up of a registered nurse, , financial resource worker and community health worker who understand the health care system. The goal of clinic care coordination is to help you manage your health and improve access to the health care system. Our team works alongside your provider to assist you in determining your health and social needs. We can help you obtain health care and community resources, providing you with necessary information and education. We can work with you through any barriers and develop a care plan that helps coordinate and strengthen the communication between you and your care team.  Our services are voluntary and are offered without charge to you personally.    Please feel free to contact me with any questions or concerns regarding care coordination and what we can offer.      We are focused on providing you with the highest-quality healthcare experience possible.      Sincerely,     Lucretia Sahu  Community Health Worker  [facility]  [phone]

## 2023-07-21 NOTE — PROGRESS NOTES
Clinic Care Coordination Contact  [facility]/Voicemail    Clinical Data: Care Coordinator Outreach  Outreach attempted x 2.  Left message on patient's voicemail with call back information and requested return call.  Plan: Care Coordinator will try to reach patient again in 1-2 business days.      Lucretia Sahu  Community Health Worker  [facility]  [phone]

## 2023-07-24 NOTE — PROGRESS NOTES
Clinic Care Coordination Contact  [facility]/Voicemail    Chart Review: Referral from AMBER Bullock, on 7/18/23:  Financial support - Pt is two weeks postpartum, hoping to explore options for affordable childcare.     Clinical Data: Care Coordinator Outreach  Outreach attempted x 3.  Left message on patient's voicemail with call back information and requested return call.  Plan: Care Coordinator will send care coordination introduction letter with care coordinator contact information and explanation of care coordination services via [employer]. Care Coordinator will do no further outreaches at this time.    Lucretia Sahu  Community Health Worker  [facility]  [phone]

## 2023-07-31 NOTE — PROGRESS NOTES
"[facility]  July 17, 2023  Behavioral Health Clinician Progress Note    Patient Name: Mally Evans           Service Type:  Individual      Service Location:   MyChart / Email (patient reached)     Session Start Time: 3:02 PM  Session End Time: 3:40 PM      Session Length: 38 - 52      Attendees: Patient     Service Modality:  Phone Visit:      Provider verified identity through the following two step process.  Patient provided:  Patient is known previously to provider    Telephone Visit: The patient's condition can be safely assessed and treated via synchronous audio telemedicine encounter.      Reason for Audio Telemedicine Visit: Patient has requested telehealth visit and Patient convenience (e.g. access to timely appointments / distance to available provider)    Originating Site (Patient Location): Patient's home    Distant Site (Provider Location): [facility]    Consent:  The patient/guardian has verbally consented to:     1. The potential risks and benefits of telemedicine (telephone visit) versus in person care;    The patient has been notified of the following:      \"We have found that certain health care needs can be provided without the need for a face to face visit.  This service lets us provide the care you need with a phone conversation.       I will have full access to your [facility] medical record during this entire phone call.   I will be taking notes for your medical record.      Since this is like an office visit, we will bill your insurance company for this service.       There are potential benefits and risks of telephone visits (e.g. limits to patient confidentiality) that differ from in-person visits.?Confidentiality still applies for telephone services, and nobody will record the visit.  It is important to be in a quiet, private space that is free of distractions (including cell phone or other devices) during the visit.??      If during the " "course of the call I believe a telephone visit is not appropriate, you will not be charged for this service\"     Consent has been obtained for this service by care team member: Yes     Visit Activities (Refresh list every visit): [facility] Only and Phone Encounter    Diagnostic Assessment Date: Deferred, patient plans to establish care with outpatient therapist. Will complete DA if further [facility] services are indicated.  Treatment Plan Review Date: Not yet created   See Flowsheets for today's PHQ-9 and JENNY-7 results  Previous PHQ-9:       5/24/2023     3:17 PM 6/16/2023     3:05 PM 7/17/2023     2:45 PM   PHQ-9 SCORE   PHQ-9 Total Score MyChart   3 (Minimal depression)   PHQ-9 Total Score 24 9 3     Previous JENNY-7:       2/16/2023     2:00 PM 6/16/2023     3:05 PM 7/17/2023     2:46 PM   JENNY-7 SCORE   Total Score 11 (moderate anxiety)  5 (mild anxiety)   Total Score 11 12 5       AISSATOU LEVEL:       No data to display                DATA  Extended Session (60+ minutes): No  Interactive Complexity: No  Crisis: No  [facility] Patient: No    Treatment Objective(s) Addressed in This Session:  Target Behavior(s): management of mental health symptoms during pregnancy    Adjustment: continue to cope adaptively to promote healthy postpartum adjustment  Relationship: reflect on appropriate boundaries with family members     Current Stressors / Issues:  Patient is about 2.5 weeks postpartum. She reported her mental health has been good, with stable/euthymic mood and minimal anxiety. Expressed strong attachment to her baby and feels she is navigating parenting well. Identified her consideration of boundaries/potential interactions with family of origin as the primary source of distress. [facility] and patient discussed ongoing support interests - patient expressed desire to establish with outpatient therapist. Will reach out to [facility] as needed for interim support.   No concerns of SI, SIB, safety concerns, or thought disturbance.     Progress on " Treatment Objective(s) / Homework:  Satisfactory progress - ACTION (Actively working towards change); Intervened by reinforcing change plan / affirming steps taken     Motivational Interviewing    MI Intervention: Co-Developed Goal: schedule intake for outpatient therapy, Expressed Empathy/Understanding, Supported Autonomy, Collaboration, Evocation, Open-ended questions, Reflections: simple and complex, and Change talk (evoked)     Change Talk Expressed by the Patient: Desire to change Ability to change Reasons to change Taking steps    Provider Response to Change Talk: E - Evoked more info from patient about behavior change, A - Affirmed patient's thoughts, decisions, or attempts at behavior change, R - Reflected patient's change talk and S - Summarized patient's change talk statements    Also provided psychoeducation about behavioral health condition, symptoms, and treatment options    Care Plan review completed: No    Medication Review:  No current psychiatric medications prescribed    Medication Compliance:  NA    Changes in Health Issues:  2.5 weeks postpartum    Chemical Use Review:   Substance Use: Chemical use reviewed, no active concerns identified      Tobacco Use: No current tobacco use.      Assessment: Current Emotional / Mental Status (status of significant symptoms):  Risk status (Self / Other harm or suicidal ideation)  Patient denies a history of suicidal ideation, suicide attempts, self-injurious behavior, homicidal ideation, homicidal behavior and and other safety concerns  Patient denies current fears or concerns for personal safety.  Patient denies current or recent suicidal ideation or behaviors.   Patient denies current or recent homicidal ideation or behaviors.  Patient denies current or recent self injurious behavior or ideation.  Patient denies other safety concerns.  A safety and risk management plan has not been developed at this time, however patient was encouraged to call [facility] [place]  / 911 should there be a change in any of these risk factors.    Appearance:   Unable to assess   Eye Contact:   Unable to assess   Psychomotor Behavior: Unable to assess   Attitude:   Cooperative  Interested Pleasant  Orientation:   All  Speech   Rate / Production: Normal/ Responsive   Volume:  Normal   Mood:    Euthymic, intermittent mild anxiety  Affect:    Appropriate   Thought Content:  Clear   Thought Form:  Coherent  Goal Directed  Logical   Insight:    Good     Diagnoses:  1. Persistent depressive disorder    2. Generalized anxiety disorder    Provisional     Collateral Reports Completed:  Not Applicable    Plan: (Homework, other):  No follow-up scheduled at this time. [facility] placed new mental health referral and care coordination referral. CD Recommendations: No indications of CD issues.     AMBER Bullock, [facility]

## 2023-08-02 ENCOUNTER — MEDICAL CORRESPONDENCE (OUTPATIENT)
Dept: HEALTH INFORMATION MANAGEMENT | Facility: CLINIC | Age: 28
End: 2023-08-02
Payer: COMMERCIAL

## 2023-08-18 ENCOUNTER — MYC MEDICAL ADVICE (OUTPATIENT)
Dept: MIDWIFE SERVICES | Facility: CLINIC | Age: 28
End: 2023-08-18

## 2023-08-18 ENCOUNTER — PRENATAL OFFICE VISIT (OUTPATIENT)
Dept: MIDWIFE SERVICES | Facility: CLINIC | Age: 28
End: 2023-08-18
Payer: COMMERCIAL

## 2023-08-18 VITALS
BODY MASS INDEX: 41.32 KG/M2 | HEART RATE: 94 BPM | OXYGEN SATURATION: 96 % | WEIGHT: 224.1 LBS | DIASTOLIC BLOOD PRESSURE: 78 MMHG | SYSTOLIC BLOOD PRESSURE: 110 MMHG

## 2023-08-18 DIAGNOSIS — R10.2 PELVIC PAIN: Primary | ICD-10-CM

## 2023-08-18 PROCEDURE — 99207 PR POST PARTUM EXAM: CPT | Performed by: ADVANCED PRACTICE MIDWIFE

## 2023-08-18 RX ORDER — PRENATAL VIT/IRON FUM/FOLIC AC 27MG-0.8MG
1 TABLET ORAL DAILY
Qty: 90 TABLET | Refills: 3 | Status: SHIPPED | OUTPATIENT
Start: 2023-08-18 | End: 2023-10-23

## 2023-08-18 ASSESSMENT — EDINBURGH POSTNATAL DEPRESSION SCALE (EPDS)
THINGS HAVE BEEN GETTING ON TOP OF ME: NO, MOST OF THE TIME I HAVE COPED QUITE WELL
THE THOUGHT OF HARMING MYSELF HAS OCCURRED TO ME: NEVER
I HAVE FELT SCARED OR PANICKY FOR NO GOOD REASON: NO, NOT MUCH
TOTAL SCORE: 7
I HAVE BLAMED MYSELF UNNECESSARILY WHEN THINGS WENT WRONG: NOT VERY OFTEN
I HAVE BEEN SO UNHAPPY THAT I HAVE HAD DIFFICULTY SLEEPING: NOT VERY OFTEN
I HAVE BEEN ABLE TO LAUGH AND SEE THE FUNNY SIDE OF THINGS: AS MUCH AS I ALWAYS COULD
I HAVE BEEN ANXIOUS OR WORRIED FOR NO GOOD REASON: HARDLY EVER
I HAVE BEEN SO UNHAPPY THAT I HAVE BEEN CRYING: ONLY OCCASIONALLY
I HAVE LOOKED FORWARD WITH ENJOYMENT TO THINGS: AS MUCH AS I EVER DID
I HAVE FELT SAD OR MISERABLE: NOT VERY OFTEN
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
## 2023-08-21 NOTE — TELEPHONE ENCOUNTER
Referral to physical therapy signed for pelvic weakness in the postpartum period.     Soraida Resendiz, GARETHM, APRN CNM

## 2023-08-21 NOTE — TELEPHONE ENCOUNTER
23  6 week PP visit 23    Inquiring about pelvic floor therapy referral.     Referral pended for care provider to review.    LINDA Mosquera
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
## 2023-08-30 ENCOUNTER — OFFICE VISIT (OUTPATIENT)
Dept: OBGYN | Facility: CLINIC | Age: 28
End: 2023-08-30
Payer: COMMERCIAL

## 2023-08-30 VITALS — BODY MASS INDEX: 40.99 KG/M2 | SYSTOLIC BLOOD PRESSURE: 110 MMHG | WEIGHT: 222.3 LBS | DIASTOLIC BLOOD PRESSURE: 88 MMHG

## 2023-08-30 DIAGNOSIS — O92.79 OTHER DISORDERS OF LACTATION: Primary | ICD-10-CM

## 2023-08-30 PROCEDURE — 99215 OFFICE O/P EST HI 40 MIN: CPT | Performed by: ADVANCED PRACTICE MIDWIFE

## 2023-08-30 ASSESSMENT — EDINBURGH POSTNATAL DEPRESSION SCALE (EPDS)
TOTAL SCORE: 7
I HAVE FELT SCARED OR PANICKY FOR NO GOOD REASON: NO, NOT MUCH
I HAVE LOOKED FORWARD WITH ENJOYMENT TO THINGS: AS MUCH AS I EVER DID
I HAVE BEEN ABLE TO LAUGH AND SEE THE FUNNY SIDE OF THINGS: AS MUCH AS I ALWAYS COULD
I HAVE BEEN SO UNHAPPY THAT I HAVE HAD DIFFICULTY SLEEPING: NOT VERY OFTEN
I HAVE BEEN ANXIOUS OR WORRIED FOR NO GOOD REASON: HARDLY EVER
I HAVE BEEN SO UNHAPPY THAT I HAVE BEEN CRYING: ONLY OCCASIONALLY
THE THOUGHT OF HARMING MYSELF HAS OCCURRED TO ME: NEVER
I HAVE FELT SAD OR MISERABLE: NOT VERY OFTEN
I HAVE BLAMED MYSELF UNNECESSARILY WHEN THINGS WENT WRONG: NOT VERY OFTEN
THINGS HAVE BEEN GETTING ON TOP OF ME: NO, MOST OF THE TIME I HAVE COPED QUITE WELL

## 2023-08-30 NOTE — PATIENT INSTRUCTIONS
"   Use good positioning for deep latch, with baby held close to body and baby's head/shoulders/hips in good alignment.  When in a seated position, make sure Shabbir is positioned directly in front of your breast, and use stepstool to elevate your knees above hips.  It might help to use a roll beneath your breast to support it, or to use only a thin pillow under Shabbir, so that you don't have to lift your breast to reach his mouth--this may be why he is sliding off the nipple.  Continue to nurse baby on cue, 8-12 times each day.  Feed on one side until baby finishes swallowing.  Once swallowing slows, use breast compression to encourage more swallowing, but once there is no more active swallowing, and baby is either sleeping, coming off the breast, or just \"nibbling,\" it is OK to use a finger to take baby off the breast and move to the other breast.  Do the same on the other side.  Offer both breasts at each feeding.  It is more important to watch the baby than the clock!    It is a good idea to start offering Shabbir bottles about once a day at this point, to help him become accustomed to feeding from a bottle. You can pump to get the milk for this bottle at any time that is convenient for you--many people like to pump after the first morning feeding because supply tends to be highest at that time of day.  You can then give that milk in a bottle later in the day.  Use the paced feeding method when giving bottles, to help babies learn to go more easily between breast and bottle, and avoid overfeeding (more possible at the bottle than the breast).  Put just a small amount of milk in the bottle to start, so as not to waste milk the baby may not take.    When you put together bottles, consider putting 3-4 oz in bottles for him.  Once babies are 9-10 pounds, they need about 25-30 oz/day (or 3-4 oz/feeding), and this where their needs stay for their entire first year;  so you don't need to keep producing more and more milk as " they grow.   Follow up with lactation as needed, and pediatric provider as planned.  [employer] can be used for brief questions, but it's important to know that messages are not seen Friday through Sunday. If urgent help is needed, Monday through Friday you can call [phone] and one of our lactation consultants will get the message and respond; if you need a rapid response over a weekend or holiday, it is best to call your on-call maternity or pediatric provider.  Please feel free to schedule a return visit if the concern is more detailed;  telephone visits are also an option if you don't feel you need to be seen in person.     _____________      It can sometimes be very stressful to pump the amount of milk needed to supply your baby at  while you are at work.  It's also a vicious [place], because the more you worry about it (sit at the pump and worry that not enough is being released) the less likely you are to have a good letdown reflex and release lots of milk!  A few things that have worked for other mothers:      1.  Many  babies do not need as much milk is being offered to them, so check with your childcare provider to see how much milk is being warmed up vs. how much milk your baby is actually drinking.  Sometimes on further questioning of the childcare providers, we discover that 5 oz were warmed up, the baby took 3 oz, and the extra 2 were tossed, which is a frustrating waste of your hard-won breastmilk!   If that is the issue, this can be solved by putting smaller amounts of milk in the bottles. If you want to leave, say, 15 oz of milk for the day, you could leave 5 bottles of 3 oz each rather than 3 bottles of 5 oz each. This can help because then there isn't so much in each bottle to warm up and waste--any bottles that are not used can then just be saved until the next day and you have less pressure to produce more.      This technique can also work even if you discover that your baby has  been taking the entire bottle--It's important to remember that when taking a bottle, a baby often doesn't have a lot of choice about whether or not to finish a bottle.  If they are awake they have to keep swallowing while the nipple is in their mouth, unlike the breast, where they can control the flow. By using smaller amounts in the bottle, providers may find that your baby is actually satisfied after, say,  3 oz rather than just continuing until the 5 oz is gone.  If baby takes the 3 oz and is still obviously hungry, then another bottle can be warmed, but if she is content, then the bottle is saved.   So those are a few thoughts on how to make the best use of the milk that you have.     2.  Another approach is to try to increase the amount of milk to send to childcare.  One way to do that is to pump a little bit while you are at home.  If your baby does not always take both breasts, you could pump the breast that she is not nursing from while she is feeding from the other.  This is a great way to get a good letdown and get more milk, because we often have a better letdown to our baby than we do to the pump (because pumping is not as inspiring and we often do not let down as well to our pump as we do to our baby).   Some women add a pumping first thing in the morning before the baby has fed, because our breasts are often most full in the mornings.   Other women add a few pumpings over the weekends, just to have that extra.   It may not be feasible in your workday, but some women can  squeeze in another pumping session at work. Another idea is to pump one breast at a time rather double pumping, and use the free hand to massage the breast being pumped--research has shown that that can significantly increase   the output of milk.  You can also do a little hand expression after the electric pumping and that has been shown can get some additional milk as well.       3.  A third angle is to minimize the number of bottles  "your baby needs while you are gone.  Try nursing immediately before going to work, either at home right before going out the door, or even at the childcare if possible, and asking your childcare provider not to feed her for the last hour before your arrive back, so that you can nurse again immediately upon your return.  Sometimes this can save on an entire bottle during the day.  For older babies, you can also have the childcare provider offer solid foods instead of a bottle feeding of milk.  This can decrease this amount of milk that is needed during the day, either by substituting the feeding of solids for a milk feeding, or by decreasing the amount of milk that is needed at that feeding.       _________    Sometimes an area of inflammation develops in the breast, with a lump or fullness, pain and sometimes redness.  This is often called a \"plugged duct,\" even though it is not actually a physical clogging of one milk duct.  It is comes from the milk ducts being narrowed by inflammation.  The main things to do are directed at decreasing inflammation:  use ice on that area if it is comfortable for you, and take ibuprofen for pain and its anti-inflammatory effects.  If heat makes the area more comfortable, especially during feeding, you can use that;  if it is helpful you can also try some very gentle massage. The massage should be extremely light, like petting a cat, and go from the center of your breast outwards towards your armpit.  This helps the swelling and extra fluid move out of your breast.  Do not do firm massage--this is not helpful (because there is no actual \"plug\" to \"squeeze out\"), and it just further injures and inflames the breast tissue.  You can also add a lecithin supplement, 1200 mg four times daily, to help with milk flow and reduce inflammation.  Continue to nurse your baby as you have been--you do not need to nurse more on that side, because increasing the milk supply will actually just worsen " the problem.  If you are pumping, just pump the amount that your little one needs.      Occasionally if the inflammation increases, women can feel tired, achy or have a fever--this is called inflammatory mastitis, and it generally goes away within 24 hours as you do the things mentioned above.  If it doesn't get better, though, and you are feeling ill or have a fever for more than 24 hours, then you may have a bacterial infection that needs medication--so you should call your midwife or doctor about a possible prescription.

## 2023-09-07 ENCOUNTER — MEDICAL CORRESPONDENCE (OUTPATIENT)
Dept: HEALTH INFORMATION MANAGEMENT | Facility: CLINIC | Age: 28
End: 2023-09-07
Payer: COMMERCIAL

## 2023-10-23 ENCOUNTER — VIRTUAL VISIT (OUTPATIENT)
Dept: FAMILY MEDICINE | Facility: CLINIC | Age: 28
End: 2023-10-23
Payer: COMMERCIAL

## 2023-10-23 DIAGNOSIS — R41.840 CONCENTRATION DEFICIT: ICD-10-CM

## 2023-10-23 DIAGNOSIS — G47.00 INSOMNIA, UNSPECIFIED TYPE: Primary | ICD-10-CM

## 2023-10-23 PROCEDURE — 99213 OFFICE O/P EST LOW 20 MIN: CPT | Mod: 95 | Performed by: NURSE PRACTITIONER

## 2023-10-23 ASSESSMENT — ANXIETY QUESTIONNAIRES
1. FEELING NERVOUS, ANXIOUS, OR ON EDGE: SEVERAL DAYS
GAD7 TOTAL SCORE: 12
7. FEELING AFRAID AS IF SOMETHING AWFUL MIGHT HAPPEN: MORE THAN HALF THE DAYS
2. NOT BEING ABLE TO STOP OR CONTROL WORRYING: SEVERAL DAYS
4. TROUBLE RELAXING: NEARLY EVERY DAY
6. BECOMING EASILY ANNOYED OR IRRITABLE: NEARLY EVERY DAY
IF YOU CHECKED OFF ANY PROBLEMS ON THIS QUESTIONNAIRE, HOW DIFFICULT HAVE THESE PROBLEMS MADE IT FOR YOU TO DO YOUR WORK, TAKE CARE OF THINGS AT HOME, OR GET ALONG WITH OTHER PEOPLE: VERY DIFFICULT
GAD7 TOTAL SCORE: 12
5. BEING SO RESTLESS THAT IT IS HARD TO SIT STILL: SEVERAL DAYS
3. WORRYING TOO MUCH ABOUT DIFFERENT THINGS: SEVERAL DAYS

## 2023-10-23 NOTE — PROGRESS NOTES
"Mally is a 28 year old who is being evaluated via a billable video visit.      How would you like to obtain your AVS? MyChart  If the video visit is dropped, the invitation should be resent by: Text to cell phone: [phone]  Will anyone else be joining your video visit? No          Assessment & Plan     Insomnia, unspecified type  We discussed sleep hygiene and she is requesting a referral to sleep medicine which is reasonable.  Unfortunately, pharmacologic options are limited with her nursing.  Recommended melatonin.  Could consider selective serotonin reuptake inhibitor at some point too as it sounds like anxiety is contributing  - Adult Sleep Eval & Management  Referral; Future    Concentration deficit  Symptoms do not sound completely consistent with ADHD and we discussed good sleep helps daytime functioning.  She would still like to pursue formal assessment.  Would consider sleep therapy at some point.  We discussed even with a diagnosis, that medication options are limited while nursing and if she becomes pregnant again.  We aslo discussed non-pharm measures are just as important for establishing new habits.    - Adult Mental Health  Referral; Future             BMI:   Estimated body mass index is 40.99 kg/m  as calculated from the following:    Height as of 12/22/22: 1.568 m (5' 1.75\").    Weight as of 8/30/23: 100.8 kg (222 lb 4.8 oz).           ROSENDO [facility]    Subjective   Mally is a 28 year old, presenting for the following health issues:  Sleep Problem      History of Present Illness       Mental Health Follow-up:  Patient presents to follow-up on Depression & Anxiety.Patient's depression since last visit has been:  Medium  The patient is having other symptoms associated with depression.  Patient's anxiety since last visit has been:  Medium  The patient is having other symptoms associated with anxiety.  Any significant life events: " relationship concerns, job concerns, financial concerns and other  Patient is feeling anxious or having panic attacks.  Patient has no concerns about alcohol or drug use.    Reason for visit:  Struggling with sleep and waking up on time  Symptom onset:  More than a month  Symptoms include:  Not being able to wake up or stay awake in mornings  Symptom intensity:  Severe  Symptom progression:  Worsening  Had these symptoms before:  Yes  Has tried/received treatment for these symptoms:  No  What makes it better:  Weed    She eats 0-1 servings of fruits and vegetables daily.She consumes 0 sweetened beverage(s) daily.She exercises with enough effort to increase her heart rate 30 to 60 minutes per day.  She exercises with enough effort to increase her heart rate 4 days per week. She is missing 7 dose(s) of medications per week.  She is not taking prescribed medications regularly due to remembering to take.       Back at work.  Having trouble getting up in the mornings.  Wakes up enough just to fall back after turning off the alarm.  Has been late for work.  Has tried setting multiple alarms, she sleeps through them.      Having trouble falling asleep and staying asleep.  Waking with 3 month old infant between 3-4 in the morning.  Baby often lays in bed with her to feed and they sleep together.  Worried she wont wake up for him.    Will try to go to bed around 8:30 or 9pm, but lays there awake for several hours tossing and turning.  Sometimes just has restless sleep in general.      Does endorse some anxiety due to all the stress in her life between work and child.  She is alone at home in the night.      Has been a long standing issue, but worse now after having a baby.      Needs to be at work by 7:30, has to bring infant to , so in the car for about an hour before work.      Wondering if she can get tested for ADHD.  Has difficulty starting tasks, but once working generally does ok.        Review of Systems    Constitutional, HEENT, cardiovascular, pulmonary, gi and gu systems are negative, except as otherwise noted.      Objective    Vitals - Patient Reported  Pain Score: No Pain (0)        Physical Exam   GENERAL: Healthy, alert and no distress  EYES: Eyes grossly normal to inspection.  No discharge or erythema, or obvious scleral/conjunctival abnormalities.  RESP: No audible wheeze, cough, or visible cyanosis.  No visible retractions or increased work of breathing.    SKIN: Visible skin clear. No significant rash, abnormal pigmentation or lesions.  NEURO: Cranial nerves grossly intact.  Mentation and speech appropriate for age.  PSYCH: Mentation appears normal, affect normal/bright, judgement and insight intact, normal speech and appearance well-groomed.              Video-Visit Details    Type of service:  Video Visit     Originating Location (pt. Location): Home    Distant Location (provider location):  Off-site  Platform used for Video Visit: Amos

## 2023-11-01 ENCOUNTER — OFFICE VISIT (OUTPATIENT)
Dept: SLEEP MEDICINE | Facility: CLINIC | Age: 28
End: 2023-11-01
Attending: NURSE PRACTITIONER
Payer: COMMERCIAL

## 2023-11-01 VITALS
SYSTOLIC BLOOD PRESSURE: 132 MMHG | BODY MASS INDEX: 42.67 KG/M2 | HEIGHT: 61 IN | WEIGHT: 226 LBS | RESPIRATION RATE: 18 BRPM | DIASTOLIC BLOOD PRESSURE: 94 MMHG | HEART RATE: 88 BPM | OXYGEN SATURATION: 96 %

## 2023-11-01 DIAGNOSIS — G47.00 INSOMNIA, UNSPECIFIED TYPE: ICD-10-CM

## 2023-11-01 DIAGNOSIS — E61.1 DIETARY IRON DEFICIENCY: Primary | ICD-10-CM

## 2023-11-01 DIAGNOSIS — G47.33 OSA (OBSTRUCTIVE SLEEP APNEA): ICD-10-CM

## 2023-11-01 PROCEDURE — 99205 OFFICE O/P NEW HI 60 MIN: CPT | Performed by: INTERNAL MEDICINE

## 2023-11-01 ASSESSMENT — SLEEP AND FATIGUE QUESTIONNAIRES
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
## 2023-11-01 NOTE — PATIENT INSTRUCTIONS
"          MY TREATMENT INFORMATION FOR SLEEP APNEA-  Ashley Wisiveth    DOCTOR : NURIA PRABHAKAR MD    Am I having a sleep study at a sleep center?  --->Due to normal delays, you will be contacted within 2-4 weeks to schedule    Am I having a home sleep study?  --->Watch the video for the device you are using:    -/drop off device-   https://www.Sosei.com/watch?v=yGGFBdELGhk        Frequently asked questions:  1. What is Obstructive Sleep Apnea (EFRAÍN)? EFRAÍN is the most common type of sleep apnea. Apnea means, \"without breath.\"  Apnea is most often caused by narrowing or collapse of the upper airway as muscles relax during sleep.   Almost everyone has occasional apneas. Most people with sleep apnea have had brief interruptions at night frequently for many years.  The severity of sleep apnea is related to how frequent and severe the events are.   2. What are the consequences of EFRAÍN? Symptoms include: feeling sleepy during the day, snoring loudly, gasping or stopping of breathing, trouble sleeping, and occasionally morning headaches or heartburn at night.  Sleepiness can be serious and even increase the risk of falling asleep while driving. Other health consequences may include development of high blood pressure and other cardiovascular disease in persons who are susceptible. Untreated EFRAÍN  can contribute to heart disease, stroke and diabetes.   3. What are the treatment options? In most situations, sleep apnea is a lifelong disease that must be managed with daily therapy. Medications are not effective for sleep apnea and surgery is generally not considered until other therapies have been tried. Your treatment is your choice . Continuous Positive Airway (CPAP) works right away and is the therapy that is effective in nearly everyone. An oral device to hold your jaw forward is usually the next most reliable option. Other options include postioning devices (to keep you off your back), weight loss, and surgery including " a tongue pacing device. There is more detail about some of these options below.  4. Are my sleep studies covered by insurance? Although we will request verification of coverage, we advise you also check in advance of the study to ensure there is coverage.    Important tips for those choosing CPAP and similar devices  For new devices, sign up for device LOIS to monitor your device for your followup visits  We encourage you to utilize the [employer] lois or website (myAir web (resmed.com) ) to monitor your therapy progress and share the data with your healthcare team when you discuss your sleep apnea.                                                    Know your equipment:  CPAP is continuous positive airway pressure that prevents obstructive sleep apnea by keeping the throat from collapsing while you are sleeping. In most cases, the device is  smart  and can slowly self-adjusts if your throat collapses and keeps a record every day of how well you are treated-this information is available to you and your care team.  BPAP is bilevel positive airway pressure that keeps your throat open and also assists each breath with a pressure boost to maintain adequate breathing.  Special kinds of BPAP are used in patients who have inadequate breathing from lung or heart disease. In most cases, the device is  smart  and can slowly self-adjusts to assist breathing. Like CPAP, the device keeps a record of how well you are treated.  Your mask is your connection to the device. You get to choose what feels most comfortable and the staff will help to make sure if fits. Here: are some examples of the different masks that are available:       Key points to remember on your journey with sleep apnea:  Sleep study.  PAP devices often need to be adjusted during a sleep study to show that they are effective and adjusted right.  Good tips to remember: Try wearing just the mask during a quiet time during the day so your body adapts to wearing  it. A humidifier is recommended for comfort in most cases to prevent drying of your nose and throat. Allergy medication from your provider may help you if you are having nasal congestion.  Getting settled-in. It takes more than one night for most of us to get used to wearing a mask. Try wearing just the mask during a quiet time during the day so your body adapts to wearing it. A humidifier is recommended for comfort in most cases. Our team will work with you carefully on the first day and will be in contact within 4 days and again at 2 and 4 weeks for advice and remote device adjustments. Your therapy is evaluated by the device each day.   Use it every night. The more you are able to sleep naturally for 7-8 hours, the more likely you will have good sleep and to prevent health risks or symptoms from sleep apnea. Even if you use it 4 hours it helps. Occasionally all of us are unable to use a medical therapy, in sleep apnea, it is not dangerous to miss one night.   Communicate. Call our skilled team on the number provided on the first day if your visit for problems that make it difficult to wear the device. Over 2 out of 3 patients can learn to wear the device long-term with help from our team. Remember to call our team or your sleep providers if you are unable to wear the device as we may have other solutions for those who cannot adapt to mask CPAP therapy. It is recommended that you sleep your sleep provider within the first 3 months and yearly after that if you are not having problems.   Use it for your health. We encourage use of CPAP masks during daytime quiet periods to allow your face and brain to adapt to the sensation of CPAP so that it will be a more natural sensation to awaken to at night or during naps. This can be very useful during the first few weeks or months of adapting to CPAP though it does not help medically to wear CPAP during wakefulness and  should not be used as a strategy just to meet  guidelines.  Take care of your equipment. Make sure you clean your mask and tubing using directions every day and that your filter and mask are replaced as recommended or if they are not working.     BESIDES CPAP, WHAT OTHER THERAPIES ARE THERE?    Positioning Device  Positioning devices are generally used when sleep apnea is mild and only occurs on your back.This example shows a pillow that straps around the waist. It may be appropriate for those whose sleep study shows milder sleep apnea that occurs primarily when lying flat on one's back. Preliminary studies have shown benefit but effectiveness at home may need to be verified by a home sleep test. These devices are generally not covered by medical insurance.  Examples of devices that maintain sleeping on the back to prevent snoring and mild sleep apnea.    Belt type body positioner  http://Whisbi[employer]/    Electronic reminder  http://nightshifttherapy.com/            Oral Appliance  What is oral appliance therapy?  An oral appliance device fits on your teeth at night like a retainer used after having braces. The device is made by a specialized dentist and requires several visits over 1-2 months before a manufactured device is made to fit your teeth and is adjusted to prevent your sleep apnea. Once an oral device is working properly, snoring should be improved. A home sleep test may be recommended at that time if to determine whether the sleep apnea is adequately treated.       [facility]  Search engine: https://mms.aadsm.org/members/directory/search_bootstrap.php?org_id=ADSM&   Certified in Dental Sleep Medicine    Kelby Garcia  Degree: KENTON  [address]  [address]  [place] [address]  Professional Phone: [phone]  Website: [email or web address]    Max Salazar  Degree: KENTON  [facility]  [address]  [address]  [place] [address]  Professional Phone: [phone]: [phone]    Katy Curtis  [facility]  [address]  [place] [address]  Professional Phone: [phone]  Website: [email or web address]      Urban Carter  Degree: DDS  [address]  [address]  [place] [address]  Professional Phone: [phone]  Fax: [phone]    Alireza Byrd  Degree: DDS  [facility] [place]  [address]  [place] [address]  Professional Phone: [phone]  Website: [email or web address]      Bar Jana  [facility]-you should verify insurance coverage  [facility] [facility] [facility]  [address]  [place] [address]  Professional Phone: [phone]  Website: [email or web address]      Nataliia Davey--DOES NOT ACCEPT INSURANCE  Degree: DDS--you should verify insurance coverage  [facility]  [facility] [facility]  [address]  [place] [address]  Professional Phone: [phone]     Shanell Shah  Degree: DDS, PhD  [facility]  [address] [place] [address] [id number] [place] [address],   [address]   [place] [address]   Appointments: [phone]   Fax: [phone]   [facility]   [address]   [address]   [place] [address]   Appointments: [phone]   Fax: [phone]                Michael Stack  Degree: DDS  [address] [place] [address]  Professional Phone: [phone]  Fax: [phone]  Website: [email or web address]      Acosta Tate  Degree: KENTON  [facility]  [address] [place] [address]    Mariona Mulet Pradera  Degree: KENTON MS Benjamin TMD, Oral Medicine, Dental Sleep [facility]  [address] [place] [address]  Professional Phone: [phone]      So Landin  Degree: DDS, MS  [facility]  [address]  [address]  [place] [address]  Professional Phone: [phone]    Desireearturo Thomas  Degree: DDS  [facility]  [address]  [address]  [place] [address]  [place] Office     Zaiderrol Leggett  Degree: DEES  [facility]  [address] [place] [address]  Professional Phone: [phone]  Website: [email or web address]      Augusto Petersontrent  Degree: DEES  [facility]  [address] [place] [address]  Professional Phone: [phone]  Fax: [phone]      Chris Lambert  Degree: KENTON [facility]  [address]  [address]  [place] [address]    Anthony Pelayo   Degree: KENTON   [facility]   [address]   [address]  [place] [address]  Phone [phone]  Website: [email or web address]                 ACCEPT MEDICARE  Julián Cervantes DDS  [address] [place] [address], [address], [place] [address]  [phone]; [phone] (fax)  [employer]    Jeevan Mendez DDS, MS   [facility]   [address]   [address]   [place] [address]   Appointments: [phone]   Fax: [phone]     Alexsander Stack DDS   [address]   [place] [address]  Appointments [phone]      ADDITIONAL PROVIDERS    Clarke Carlson DDS   [facility]   [address]   [address]   [place] [address]   Appointments: [phone]   Fax: [phone]       Loy Lombardi DDS, MS   [facility]  [address] [place] [address] [address]  [place] [address]   Appointments: [phone] Ext: [phone]  Fax: [phone]   [email or web address]    Some things to remember:  -Oral devices are often, but not always, covered by your medical insurance. Be sure to check with your insurance provider.   -If you are referred for oral therapy, you will be given a list of specialized dentists to consider or you may choose to visit the Web site of the American Academy of Dental  Sleep Medicine  -Oral devices are less likely to work if you have severe sleep apnea or are extremely overweight.     More detailed information  An oral appliance is a small acrylic device that fits over the upper and lower teeth  (similar to a retainer or a mouth guard). This device slightly moves jaw forward, which moves the base of the tongue forward, opens the airway, improves breathing for effective treat snoring and obstructive sleep apnea in perhaps 7 out of 10 people .  The best working devices are custom-made by a dental device  after a mold is made of the teeth 1, 2, 3.  When is an oral appliance indicated?  Oral appliance therapy is recommended as a first-line treatment for patients with primary snoring, mild sleep apnea, and for patients with moderate sleep apnea who prefer appliance therapy to use of CPAP4, 5. Severity of sleep apnea is determined by sleep testing and is based on the number of respiratory events per hour of sleep.   How successful is oral appliance therapy?  The success rate of oral appliance therapy in patients with mild sleep apnea is 75-80% while in patients with moderate sleep apnea it is 50-70%. The chance of success in patients with severe sleep apnea is 40-50%. The research also shows that oral appliances have a beneficial effect on the cardiovascular health of EFRAÍN patients at the same magnitude as CPAP therapy7.  Oral appliances should be a second-line treatment in cases of severe sleep apnea, but if not completely successful then a combination therapy utilizing CPAP plus oral appliance therapy may be effective. Oral appliances tend to be effective in a broad range of patients although studies show that the patients who have the highest success are females, younger patients, those with milder disease, and less severe obesity. 3, 6.   Finding a dentist that practices dental sleep medicine  Specific training is available through the American Academy of Dental Sleep  Medicine for dentists interested in working in the field of sleep. To find a dentist who is educated in the field of sleep and the use of oral appliances, near you, visit the Web site of the American Academy of Dental Sleep Medicine.    References  1. Julia et al. Objectively measured vs self-reported compliance during oral appliance therapy for sleep-disordered breathing. Chest 2013; 144(5): 1411-4702.  2. Sofia et al. Objective measurement of compliance during oral appliance therapy for sleep-disordered breathing. Thorax 2013; 68(1): 91-96.  3. Carley et al. Mandibular advancement devices in 620 men and women with EFRAÍN and snoring: tolerability and predictors of treatment success. Chest 2004; 125: 7039-4233.  4. Curtis et al. Oral appliances for snoring and EFRAÍN: a review. Sleep 2006; 29: 244-262.  5. Jesús et al. Oral appliance treatment for EFRAÍN: an update. J Clin Sleep Med 2014; 10(2): 215-227.  6. Jaziel et al. Predictors of OSAH treatment outcome. J Dent Res 2007; 86: 4687-9147.      Weight Loss:    Weight loss is a long-term strategy that may improve sleep apnea in some patients.    Weight management is a personal decision and the decision should be based on your interest and the potential benefits.  If you are interested in exploring weight loss strategies, the following discussion covers the impact on weight loss on sleep apnea and the approaches that may be successful.    Being overweight does not necessarily mean you will have health consequences.  Those who have BMI over 35 or over 27 with existing medical conditions carries greater risk.   Weight loss decreases severity of sleep apnea in most people with obesity. For those with mild obesity who have developed snoring with weight gain, even 15-30 pound weight loss can improve and occasionally eliminate sleep apnea.  Structured and life-long dietary and health habits are necessary to lose weight and keep healthier weight levels.      Though there may be significant health benefits from weight loss, long-term weight loss is very difficult to achieve- studies show success with dietary management in less than 10% of people. In addition, substantial weight loss may require years of dietary control and may be difficult if patients have severe obesity. In these cases, surgical management may be considered.  Finally, older individuals who have tolerated obesity without health complications may be less likely to benefit from weight loss strategies.      [unfilled]    Surgery:    Surgery for obstructive sleep apnea is considered generally only when other therapies fail to work. Surgery may be discussed with you if you are having a difficult time tolerating CPAP and or when there is an abnormal structure that requires surgical correction.  Nose and throat surgeries often enlarge the airway to prevent collapse.  Most of these surgeries create pain for 1-2 weeks and up to half of the most common surgeries are not effective throughout life.  You should carefully discuss the benefits and drawbacks to surgery with your sleep provider and surgeon to determine if it is the best solution for you.   More information  Surgery for EFRAÍN is directed at areas that are responsible for narrowing or complete obstruction of the airway during sleep.  There are a wide range of procedures available to enlarge and/or stabilize the airway to prevent blockage of breathing in the three major areas where it can occur: the palate, tongue, and nasal regions.  Successful surgical treatment depends on the accurate identification of the factors responsible for obstructive sleep apnea in each person.  A personalized approach is required because there is no single treatment that works well for everyone.  Because of anatomic variation, consultation with an examination by a sleep surgeon is a critical first step in determining what surgical options are best for each patient.  In some  cases, examination during sedation may be recommended in order to guide the selection of procedures.  Patients will be counseled about risks and benefits as well as the typical recovery course after surgery. Surgery is typically not a cure for a person s EFRAÍN.  However, surgery will often significantly improve one s EFRAÍN severity (termed  success rate ).  Even in the absence of a cure, surgery will decrease the cardiovascular risk associated with OSA7; improve overall quality of life8 (sleepiness, functionality, sleep quality, etc).      Palate Procedures:  Patients with EFRAÍN often have narrowing of their airway in the region of their tonsils and uvula.  The goals of palate procedures are to widen the airway in this region as well as to help the tissues resist collapse.  Modern palate procedure techniques focus on tissue conservation and soft tissue rearrangement, rather than tissue removal.  Often the uvula is preserved in this procedure. Residual sleep apnea is common in patient after pharyngoplasty with an average reduction in sleep apnea events of 33%2.      Tongue Procedures:  ExamWhile patients are awake, the muscles that surround the throat are active and keep this region open for breathing. These muscles relax during sleep, allowing the tongue and other structures to collapse and block breathing.  There are several different tongue procedures available.  Selection of a tongue base procedure depends on characteristics seen on physical exam.  Generally, procedures are aimed at removing bulky tissues in this area or preventing the back of the tongue from falling back during sleep.  Success rates for tongue surgery range from 50-62%3.    Hypoglossal Nerve Stimulation:  Hypoglossal nerve stimulation has recently received approval from the [place] Food and Drug Administration for the treatment of obstructive sleep apnea.  This is based on research showing that the system was safe and effective in treating sleep  apnea6.  Results showed that the median AHI score decreased 68%, from 29.3 to 9.0. This therapy uses an implant system that senses breathing patterns and delivers mild stimulation to airway muscles, which keeps the airway open during sleep.  The system consists of three fully implanted components: a small generator (similar in size to a pacemaker), a breathing sensor, and a stimulation lead.  Using a small handheld remote, a patient turns the therapy on before bed and off upon awakening.    Candidates for this device must be greater than 18 years of age, have moderate to severe EFRAÍN (AHI between 15-65), BMI less than 35, have tried CPAP/oral appliance for at least 8 weeks without success, and have appropriate upper airway anatomy (determined by a sleep endoscopy performed by Dr. Girma Delaney).    Hypoglossal Nerve Stimulation Pathway:    The sleep surgeon s office will work with the patient through the insurance prior-authorization process (including communications and appeals).    Nasal Procedures:  Nasal obstruction can interfere with nasal breathing during the day and night.  Studies have shown that relief of nasal obstruction can improve the ability of some patients to tolerate positive airway pressure therapy for obstructive sleep apnea1.  Treatment options include medications such as nasal saline, topical corticosteroid and antihistamine sprays, and oral medications such as antihistamines or decongestants. Non-surgical treatments can include external nasal dilators for selected patients. If these are not successful by themselves, surgery can improve the nasal airway either alone or in combination with these other options.      Combination Procedures:  Combination of surgical procedures and other treatments may be recommended, particularly if patients have more than one area of narrowing or persistent positional disease.  The success rate of combination surgery ranges from 66-80%2,3.    References  Anisha MIRANDA  The Role of the Nose in Snoring and Obstructive Sleep Apnoea: An Update.  Eur Arch Otorhinolaryngol. 2011; 268: 1365-73.   Josh SM; Rick JA; Sonia JR; Pallanch JF; Harjinder MB; Aruna SG; Angeli SANTIZO. Surgical modifications of the upper airway for obstructive sleep apnea in adults: a systematic review and meta-analysis. SLEEP 2010;33(10):8562-8551. Kareem WING. Hypopharyngeal surgery in obstructive sleep apnea: an evidence-based medicine review.  Arch Otolaryngol Head Neck Surg. 2006 Feb;132(2):206-13.  Aaron YH1, Maynor Y, Francisco SHERYL. The efficacy of anatomically based multilevel surgery for obstructive sleep apnea. Otolaryngol Head Neck Surg. 2003 Oct;129(4):327-35.  Kezirian E, Goldberg A. Hypopharyngeal Surgery in Obstructive Sleep Apnea: An Evidence-Based Medicine Review. Arch Otolaryngol Head Neck Surg. 2006 Feb;132(2):206-13.  Matthew VACA et al. Upper-Airway Stimulation for Obstructive Sleep Apnea.  N Engl J Med. 2014 Jan 9;370(2):139-49.  Pealma Y et al. Increased Incidence of Cardiovascular Disease in Middle-aged Men with Obstructive Sleep Apnea. Am J Respir Crit Care Med; 2002 166: 159-165  Sánchez EM et al. Studying Life Effects and Effectiveness of Palatopharyngoplasty (SLEEP) study: Subjective Outcomes of Isolated Uvulopalatopharyngoplasty. Otolaryngol Head Neck Surg. 2011; 144: 623-631.        WHAT IF I ONLY HAVE SNORING?    Mandibular advancement devices, lateral sleep positioning, long-term weight loss and treatment of nasal allergies have been shown to improve snoring.  Exercising tongue muscles with a game (https://apps.Be Here.Expediciones.mx/us/lois/soundly-reduce-snoring/ie4965196075) or stimulating the tongue during the day with a device (https://doi.org/10.3390/mik07694255) have improved snoring in some individuals.    Remember to Drive Safe... Drive Alive     Sleep health profoundly affects your health, mood, and your safety.  Thirty three percent of the population (one in three of us) is not getting enough sleep  and many have a sleep disorder. Not getting enough sleep or having an untreated / undertreated sleep condition may make us sleepy without even knowing it. In fact, our driving could be dramatically impaired due to our sleep health. As your provider, here are some things I would like you to know about driving:     Here are some warning signs for impairment and dangerous drowsy driving:              -Having been awake more than 16 hours               -Looking tired               -Eyelid drooping              -Head nodding (it could be too late at this point)              -Driving for more than 30 minutes     Some things you could do to make the driving safer if you are experiencing some drowsiness:              -Stop driving and rest              -Call for transportation              -Make sure your sleep disorder is adequately treated     Some things that have been shown NOT to work when experiencing drowsiness while driving:              -Turning on the radio              -Opening windows              -Eating any  distracting  /  entertaining  foods (e.g., sunflower seeds, candy, or any other)              -Talking on the phone      Your decision may not only impact your life, but also the life of others. Please, remember to drive safe for yourself and all of us.               Your BMI is Body mass index is 42.7 kg/m .    What is BMI?  Body mass index (BMI) is one way to tell whether you are at a healthy weight, overweight, or obese. It measures your weight in relation to your height.  A BMI of 18.5 to 24.9 is in the healthy range. A person with a BMI of 25 to 29.9 is considered overweight, and someone with a BMI of 30 or greater is considered obese.  Another way to find out if you are at risk for health problems caused by overweight and obesity is to measure your waist. If you are a woman and your waist is more than 35 inches, or if you are a man and your waist is more than 40 inches, your risk of disease may be  higher.  More than two-thirds of American adults are considered overweight or obese. Being overweight or obese increases the risk for further weight gain.  Excess weight may lead to heart disease and diabetes. Creating and following plans for healthy eating and physical activity may help you improve your health.  Your blood pressure was checked while you were in clinic today.  Please read the guidelines below about what these numbers mean and what you should do about them.  Your systolic blood pressure is the top number.  This is the pressure when the heart is pumping.  Your diastolic blood pressure is the bottom number.  This is the pressure in between beats.  If your systolic blood pressure is less than 120 and your diastolic blood pressure is less than 80, then your blood pressure is normal. There is nothing more that you need to do about it  If your systolic blood pressure is 120-139 or your diastolic blood pressure is 80-89, your blood pressure may be higher than it should be.  You should have your blood pressure re-checked within a year by a primary care provider.  If your systolic blood pressure is 140 or greater or your diastolic blood pressure is 90 or greater, you may have high blood pressure.  High blood pressure is treatable, but if left untreated over time it can put you at risk for heart attack, stroke, or kidney failure.  You should have your blood pressure re-checked by a primary care provider within the next four weeks.    Methods for maintaining or losing weight.  Weight control is part of healthy lifestyle and includes exercise, emotional health, and healthy eating habits.  Careful eating habits lifelong is the mainstay of weight control.  Though there are significant health benefits from weight loss, long-term weight loss with diet alone may be very difficult to achieve- studies show long-term success with dietary management in less than 10% of people. Attaining a healthy weight may be especially  difficult to achieve in those with severe obesity. In some cases, medications, devices and surgical management might be considered.    What can you do?  If you are overweight or obese and are interested in methods for weight loss, you should discuss this with your provider. In addition, we recommend that you review healthy life styles and methods for weight loss available through the National Institutes of Health patient information sites:   http://win.niddk.nih.gov/publications/index.htm      Your blood pressure was checked while you were in clinic today.  Please read the guidelines below about what these numbers mean and what you should do about them.  Your systolic blood pressure is the top number.  This is the pressure when the heart is pumping.  Your diastolic blood pressure is the bottom number.  This is the pressure in between beats.  If your systolic blood pressure is less than 120 and your diastolic blood pressure is less than 80, then your blood pressure is normal. There is nothing more that you need to do about it  If your systolic blood pressure is 120-139 or your diastolic blood pressure is 80-89, your blood pressure may be higher than it should be.  You should have your blood pressure re-checked within a year by a primary care provider.  If your systolic blood pressure is 140 or greater or your diastolic blood pressure is 90 or greater, you may have high blood pressure.  High blood pressure is treatable, but if left untreated over time it can put you at risk for heart attack, stroke, or kidney failure.  You should have your blood pressure re-checked by a primary care provider within the next four weeks.

## 2023-11-02 ENCOUNTER — OFFICE VISIT (OUTPATIENT)
Dept: SLEEP MEDICINE | Facility: CLINIC | Age: 28
End: 2023-11-02
Payer: COMMERCIAL

## 2023-11-02 DIAGNOSIS — G47.33 OSA (OBSTRUCTIVE SLEEP APNEA): ICD-10-CM

## 2023-11-02 PROCEDURE — G0399 HOME SLEEP TEST/TYPE 3 PORTA: HCPCS | Performed by: INTERNAL MEDICINE

## 2023-11-02 NOTE — PROGRESS NOTES
Pt is completing a home sleep test. Pt was instructed on how to put on the Noxturnal T3 device and associated equipment before going to bed and given the opportunity to practice putting it on before leaving the sleep center. Pt was reminded to bring the home sleep test kit back to the center tomorrow, at agreed upon time for download and reporting.

## 2023-11-03 ENCOUNTER — DOCUMENTATION ONLY (OUTPATIENT)
Dept: SLEEP MEDICINE | Facility: CLINIC | Age: 28
End: 2023-11-03
Payer: COMMERCIAL

## 2023-11-08 NOTE — PROGRESS NOTES
This HSAT was performed using a Noxturnal T3 device which recorded snore, sound, movement activity, body position, nasal pressure, oronasal thermal airflow, pulse, oximetry and both chest and abdominal respiratory effort. HSAT data was restricted to the time patient states they were in bed.     HSAT was scored using 1B 4% hypopnea rule.     HST AHI (Non-PAT): 5.7  Snoring was reported as mild and moderate.  Time with SpO2 below 89% was 12 minutes.   Overall signal quality was good     Pt will follow up with sleep provider to determine appropriate therapy.

## 2023-11-13 NOTE — PROCEDURES
"HOME SLEEP STUDY INTERPRETATION        Patient: Mally Evans  MRN: [id number]  YOB: 1995  Study Date: 11/2/2023  PCP/Referring Provider: No Ref-Primary, Physician;   Ordering Provider:   Bert Campoverde MD         Indications for Home Study: Mally Evans is a 28 year old female with a history of depression, insomnia who presents with symptoms suggestive of obstructive sleep apnea.    Estimated body mass index is 42.7 kg/m  as calculated from the following:    Height as of 11/1/23: 1.549 m (5' 1\").    Weight as of 11/1/23: 102.5 kg (226 lb).  Total score - [place]: 6 (11/1/2023  1:05 PM)  STOP-BANG: 3/8        Data: A full night home sleep study was performed recording the standard physiologic parameters including body position, movement, sound, nasal pressure, thermal oral airflow, chest and abdominal movements with respiratory inductance plethysmography, and oxygen saturation by pulse oximetry. Pulse rate was estimated by oximetry recording. This study was considered adequate based on > 4 hours of quality oximetry and respiratory recording. As specified by the AASM Manual for the Scoring of Sleep and Associated events, version 2.3, Rule VIII.D 1B, 4% oxygen desaturation scoring for hypopneas is used as a standard of care on all home sleep apnea testing.        Analysis Time:  498 minutes        Respiration:   Sleep Associated Hypoxemia: minimal sustained hypoxemia was present. Baseline oxygen saturation was 592.  Time with saturation less than or equal to 88% was 5 minutes. The lowest oxygen saturation was 73%.   Snoring: Snoring was present 38% of the time.  Respiratory events: The home study revealed a presence of 0 obstructive apneas and 11 mixed and central apneas. There were 36 hypopneas resulting in a combined apnea/hypopnea index [AHI] of 5.7 events per hour.  AHI was 7 per hour supine, 0 per hour prone, 2.5 per hour on left side, and 2.1 per hour on right side.   Pattern: Excluding events " noted above, respiratory rate and pattern was Normal.      Position: Percent of time spent: supine - 78%, prone - 0%, on left - 10%, on right - 12%.      Heart Rate: By pulse oximetry normal rate was noted.       Assessment:   Mild positional obstructive sleep apnea.  Sleep associated hypoxemia was present.    Recommendations:  Consider oral appliance therapy or positional therapy. Based on initial discussion, patient was referred for dental device therapy for sleep apnea.        Diagnosis Code(s): Obstructive Sleep Apnea G47.33, Hypoxemia G47.36, Snoring R06.83    Electronically signed by: NURIA PRABHAKAR MD, November 12, 2023   Diplomate, American Board of Internal Medicine, Sleep Medicine

## 2023-12-01 ENCOUNTER — THERAPY VISIT (OUTPATIENT)
Dept: PHYSICAL THERAPY | Facility: CLINIC | Age: 28
End: 2023-12-01
Attending: ADVANCED PRACTICE MIDWIFE
Payer: COMMERCIAL

## 2023-12-01 DIAGNOSIS — R10.2 PELVIC PAIN: ICD-10-CM

## 2023-12-01 PROCEDURE — 97161 PT EVAL LOW COMPLEX 20 MIN: CPT | Mod: GP

## 2023-12-01 PROCEDURE — 97535 SELF CARE MNGMENT TRAINING: CPT | Mod: 59

## 2023-12-01 PROCEDURE — 97530 THERAPEUTIC ACTIVITIES: CPT | Mod: GP
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
## 2023-12-15 ENCOUNTER — THERAPY VISIT (OUTPATIENT)
Dept: PHYSICAL THERAPY | Facility: CLINIC | Age: 28
End: 2023-12-15
Payer: COMMERCIAL

## 2023-12-15 DIAGNOSIS — R10.2 PELVIC PAIN: Primary | ICD-10-CM

## 2023-12-15 PROCEDURE — 97530 THERAPEUTIC ACTIVITIES: CPT | Mod: GP

## 2023-12-15 PROCEDURE — 97110 THERAPEUTIC EXERCISES: CPT | Mod: GP

## 2023-12-15 PROCEDURE — 97140 MANUAL THERAPY 1/> REGIONS: CPT | Mod: GP

## 2023-12-29 ENCOUNTER — THERAPY VISIT (OUTPATIENT)
Dept: PHYSICAL THERAPY | Facility: CLINIC | Age: 28
End: 2023-12-29
Payer: COMMERCIAL

## 2023-12-29 DIAGNOSIS — R10.2 PELVIC PAIN: Primary | ICD-10-CM

## 2023-12-29 PROCEDURE — 97110 THERAPEUTIC EXERCISES: CPT | Mod: GP

## 2023-12-29 PROCEDURE — 97140 MANUAL THERAPY 1/> REGIONS: CPT | Mod: GP

## 2023-12-29 PROCEDURE — 97530 THERAPEUTIC ACTIVITIES: CPT | Mod: GP

## 2024-01-19 ENCOUNTER — TELEPHONE (OUTPATIENT)
Dept: MIDWIFE SERVICES | Facility: CLINIC | Age: 29
End: 2024-01-19
Payer: COMMERCIAL

## 2024-01-19 ENCOUNTER — TELEPHONE (OUTPATIENT)
Dept: PEDIATRICS | Facility: CLINIC | Age: 29
End: 2024-01-19
Payer: COMMERCIAL

## 2024-01-19 NOTE — TELEPHONE ENCOUNTER
Franklin Goff,     I saw Mally's 6 month old baby today, and she scored high on her [place] PPDS screen - score of 16. I'm hoping your clinic can follow up with her, as she says she doesn't have a PCP. I told her I would send you a message.     Thank you,     Shana WAYNE-PC

## 2024-01-19 NOTE — TELEPHONE ENCOUNTER
Telephone call to Mally after receiving a note from peds NP that her EPDS at her son's 6 month checkup was 16. Left a VM for Mally to call the clinic to touch base. Also will forward chart to our clinic [facility], [place], whom Mally has worked with before.  Denver Krueger CNM

## 2024-01-19 NOTE — Clinical Note
Franklin Bella Mally's EPDS at her son's 6mos checkup is 16. Hoping we can loop her back into care to help get her what she needs. Denver Krueger CNM

## 2024-01-23 ENCOUNTER — TELEPHONE (OUTPATIENT)
Dept: BEHAVIORAL HEALTH | Facility: CLINIC | Age: 29
End: 2024-01-23
Payer: COMMERCIAL

## 2024-01-23 NOTE — TELEPHONE ENCOUNTER
Patient outreach per CNM referral. No answer - left VM welcoming patient to call clinic to reach [facility].

## 2024-01-29 ENCOUNTER — E-VISIT (OUTPATIENT)
Dept: OBGYN | Facility: CLINIC | Age: 29
End: 2024-01-29
Payer: COMMERCIAL

## 2024-01-29 DIAGNOSIS — R41.840 DIFFICULTY CONCENTRATING: ICD-10-CM

## 2024-01-29 DIAGNOSIS — F41.8 MIXED ANXIETY AND DEPRESSIVE DISORDER: Primary | ICD-10-CM

## 2024-01-29 PROCEDURE — 99422 OL DIG E/M SVC 11-20 MIN: CPT | Performed by: ADVANCED PRACTICE MIDWIFE

## 2024-01-29 ASSESSMENT — ANXIETY QUESTIONNAIRES
7. FEELING AFRAID AS IF SOMETHING AWFUL MIGHT HAPPEN: MORE THAN HALF THE DAYS
GAD7 TOTAL SCORE: 14
4. TROUBLE RELAXING: NEARLY EVERY DAY
5. BEING SO RESTLESS THAT IT IS HARD TO SIT STILL: SEVERAL DAYS
6. BECOMING EASILY ANNOYED OR IRRITABLE: MORE THAN HALF THE DAYS
3. WORRYING TOO MUCH ABOUT DIFFERENT THINGS: MORE THAN HALF THE DAYS
8. IF YOU CHECKED OFF ANY PROBLEMS, HOW DIFFICULT HAVE THESE MADE IT FOR YOU TO DO YOUR WORK, TAKE CARE OF THINGS AT HOME, OR GET ALONG WITH OTHER PEOPLE?: VERY DIFFICULT
7. FEELING AFRAID AS IF SOMETHING AWFUL MIGHT HAPPEN: MORE THAN HALF THE DAYS
GAD7 TOTAL SCORE: 14
GAD7 TOTAL SCORE: 14
1. FEELING NERVOUS, ANXIOUS, OR ON EDGE: MORE THAN HALF THE DAYS
2. NOT BEING ABLE TO STOP OR CONTROL WORRYING: MORE THAN HALF THE DAYS

## 2024-01-29 ASSESSMENT — PATIENT HEALTH QUESTIONNAIRE - PHQ9
SUM OF ALL RESPONSES TO PHQ QUESTIONS 1-9: 16
10. IF YOU CHECKED OFF ANY PROBLEMS, HOW DIFFICULT HAVE THESE PROBLEMS MADE IT FOR YOU TO DO YOUR WORK, TAKE CARE OF THINGS AT HOME, OR GET ALONG WITH OTHER PEOPLE: EXTREMELY DIFFICULT
SUM OF ALL RESPONSES TO PHQ QUESTIONS 1-9: 16

## 2024-01-30 RX ORDER — HYDROXYZINE PAMOATE 25 MG/1
25 CAPSULE ORAL 3 TIMES DAILY PRN
Qty: 60 CAPSULE | Refills: 0 | Status: SHIPPED | OUTPATIENT
Start: 2024-01-30 | End: 2024-04-05

## 2024-01-30 RX ORDER — CITALOPRAM HYDROBROMIDE 10 MG/1
10 TABLET ORAL DAILY
Qty: 45 TABLET | Refills: 0 | Status: SHIPPED | OUTPATIENT
Start: 2024-01-30 | End: 2024-04-05

## 2024-01-30 ASSESSMENT — PATIENT HEALTH QUESTIONNAIRE - PHQ9: SUM OF ALL RESPONSES TO PHQ QUESTIONS 1-9: 16

## 2024-01-30 ASSESSMENT — ANXIETY QUESTIONNAIRES: GAD7 TOTAL SCORE: 14

## 2024-01-30 NOTE — TELEPHONE ENCOUNTER
Telephone call to Mally to discuss her symptoms and elevated PHQ-9/JENNY-7 responses. She has struggled with her mood in the past. Recently gave birth almost 7 months ago. Her period came back right around [place] time, and her symptoms seemed to worsen after that. No dx history of PMDD. She does note that her mood was really bad right before her period. She denies suicidal ideation and/or plan for self harm. She finds vaishnavi in her son and her dog, and is back in school for business degree. Her son goes to  while she is in school, but she is a single parent with little support. She has a therapist that she likes and sees regularly. She is still breastfeeding, and would like to avoid any medications that could disrupt her milk supply. Mally is safe at home. Another issue for her is that she is struggling with difficulty concentrating, and thinks she likely has ADHD. She is interested in evaluation for that as well.    Discussed her medication history. She has tried a few different medications in the past for anxiety/depression, but doesn't remember what worked. Zoloft and Celexa found in her chart, with refills of Celexa having been requested. She would like to trial medication again to help manage her mood. She has also used hydroxyzine in the past, and is open to trying that in small doses for anxiety attacks.     She is struggling with insomnia. Seeing sleep medicine in late February. Discussed different sleep medications, but many of the medications that we use in pregnancy/breastfeeding can disrupt milk supply.     Will start back on Celexa for now, with hydroxyzine for anxiety. Mental health assessment referral sent for ADHD testing. Plan to follow-up by phone/video visit in 4 weeks to see how she is feeling on current dose of celexa. Discussed risk of increased suicidal ideation in young women when starting SSRIs. [facility] on sleep medications that are safe to use with  breastfeeding.    (F41.8) Mixed anxiety and depressive disorder  (primary encounter diagnosis)  Plan: Adult Mental Health  Referral,         citalopram (CELEXA) 10 MG tablet, hydrOXYzine         ayla (VISTARIL) 25 MG capsule    (R41.840) Difficulty concentrating  Plan: Adult Mental Health  Referral    Denver Krueger CNM  Provider E-Visit time total (minutes): 20

## 2024-02-23 ENCOUNTER — OFFICE VISIT (OUTPATIENT)
Dept: SLEEP MEDICINE | Facility: CLINIC | Age: 29
End: 2024-02-23
Payer: COMMERCIAL

## 2024-02-23 ENCOUNTER — LAB (OUTPATIENT)
Dept: LAB | Facility: CLINIC | Age: 29
End: 2024-02-23
Payer: COMMERCIAL

## 2024-02-23 VITALS
BODY MASS INDEX: 41.72 KG/M2 | DIASTOLIC BLOOD PRESSURE: 55 MMHG | WEIGHT: 221 LBS | HEIGHT: 61 IN | SYSTOLIC BLOOD PRESSURE: 112 MMHG | HEART RATE: 96 BPM | OXYGEN SATURATION: 100 %

## 2024-02-23 DIAGNOSIS — E61.1 DIETARY IRON DEFICIENCY: ICD-10-CM

## 2024-02-23 DIAGNOSIS — G47.33 OSA (OBSTRUCTIVE SLEEP APNEA): Primary | ICD-10-CM

## 2024-02-23 DIAGNOSIS — G25.81 RESTLESS LEGS SYNDROME (RLS): ICD-10-CM

## 2024-02-23 LAB
FERRITIN SERPL-MCNC: 31 NG/ML (ref 6–175)
IRON BINDING CAPACITY (ROCHE): 350 UG/DL (ref 240–430)
IRON SATN MFR SERPL: 31 % (ref 15–46)
IRON SERPL-MCNC: 109 UG/DL (ref 37–145)

## 2024-02-23 PROCEDURE — 83550 IRON BINDING TEST: CPT

## 2024-02-23 PROCEDURE — 82728 ASSAY OF FERRITIN: CPT

## 2024-02-23 PROCEDURE — 99214 OFFICE O/P EST MOD 30 MIN: CPT

## 2024-02-23 PROCEDURE — 36415 COLL VENOUS BLD VENIPUNCTURE: CPT
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
## 2024-02-23 NOTE — PATIENT INSTRUCTIONS
Try a sleep positioning device available on [employer]:    Sleep Noodle   SlumberBUMP        Continue with iron supplementation. We will recheck a ferritin level.

## 2024-03-19 ENCOUNTER — ANCILLARY PROCEDURE (OUTPATIENT)
Dept: GENERAL RADIOLOGY | Facility: CLINIC | Age: 29
End: 2024-03-19
Attending: PHYSICIAN ASSISTANT
Payer: COMMERCIAL

## 2024-03-19 ENCOUNTER — OFFICE VISIT (OUTPATIENT)
Dept: URGENT CARE | Facility: URGENT CARE | Age: 29
End: 2024-03-19
Payer: COMMERCIAL

## 2024-03-19 VITALS
BODY MASS INDEX: 42.35 KG/M2 | SYSTOLIC BLOOD PRESSURE: 102 MMHG | OXYGEN SATURATION: 97 % | WEIGHT: 224 LBS | DIASTOLIC BLOOD PRESSURE: 74 MMHG | TEMPERATURE: 98.3 F | HEART RATE: 94 BPM

## 2024-03-19 DIAGNOSIS — M19.019 AC JOINT ARTHROPATHY: ICD-10-CM

## 2024-03-19 DIAGNOSIS — R09.81 NASAL CONGESTION: ICD-10-CM

## 2024-03-19 DIAGNOSIS — J32.9 BACTERIAL SINUSITIS: Primary | ICD-10-CM

## 2024-03-19 DIAGNOSIS — B96.89 BACTERIAL SINUSITIS: Primary | ICD-10-CM

## 2024-03-19 PROCEDURE — 73050 X-RAY EXAM OF SHOULDERS: CPT | Mod: TC | Performed by: RADIOLOGY

## 2024-03-19 PROCEDURE — 99214 OFFICE O/P EST MOD 30 MIN: CPT | Performed by: PHYSICIAN ASSISTANT

## 2024-03-19 RX ORDER — AMOXICILLIN 875 MG
875 TABLET ORAL 2 TIMES DAILY
Qty: 20 TABLET | Refills: 0 | Status: SHIPPED | OUTPATIENT
Start: 2024-03-19 | End: 2024-03-29

## 2024-03-19 RX ORDER — IBUPROFEN 600 MG/1
600 TABLET, FILM COATED ORAL EVERY 6 HOURS PRN
Qty: 30 TABLET | Refills: 0 | Status: SHIPPED | OUTPATIENT
Start: 2024-03-19 | End: 2024-04-26

## 2024-03-19 NOTE — PROGRESS NOTES
"  Assessment & Plan     Bacterial sinusitis    You have been diagnosed with a bacterial sinus infection. Sinusitis can occur during a cold. It can also happen due to allergies to pollens and other particles in the air.  A sinus infection can sometimes cause fever, headache, and facial pain. There is often green or yellow fluid draining from the nose or into the back of the throat (post-nasal drip). This infection is treated with antibiotics.  Home care  Take the full course of antibiotics as instructed. Don't stop taking them, even when you feel better.  Drink plenty of water, hot tea, and other liquids as directed by the healthcare provider. This may help thin nasal mucus. It also may help your sinuses drain fluids.  Heat may help soothe painful areas of your face. Use a towel soaked in hot water. Or,  the shower and direct the warm spray onto your face. Using a vaporizer along with a menthol rub at night may also help soothe symptoms.     - amoxicillin (AMOXIL) 875 MG tablet; Take 1 tablet (875 mg) by mouth 2 times daily for 10 days    Nasal congestion    Saline nasal spray  Steam  Hot showeres    AC joint arthropathy    Xray ac joint Negative for acute findings, read by Hilario TORREZ at time of visit.    Due to AC joint tenderness  Ice compresses  Motrin for AC joint tenderness  Referral to ORthopedics  Xray AC joint Negative for acute findings, read by Hilario TORREZ at time of visit.    - XR Acromioclavicular Joint Bilateral; Future  - ibuprofen (ADVIL/MOTRIN) 600 MG tablet; Take 1 tablet (600 mg) by mouth every 6 hours as needed for moderate pain  - Orthopedic  Referral; Future    Review of external notes as documented elsewhere in note    BMI  Estimated body mass index is 42.35 kg/m  as calculated from the following:    Height as of 2/23/24: 1.549 m (5' 0.98\").    Weight as of this encounter: 101.6 kg (224 lb).       CONSULTATION/REFERRAL to Orthopedics    No follow-ups on " file.    Kaylee Bal is a 28 year old, presenting for the following health issues:  Urgent Care (On and off stuffy and runny nose X1 month, last night throat pain, ear ache bilateral, body aches, nausea, decreased appetite, mild headache, fatigued /Taking acetaminophen )    HPI     Review of Systems  Constitutional, neuro, ENT, endocrine, pulmonary, cardiac, gastrointestinal, genitourinary, musculoskeletal, integument and psychiatric systems are negative, except as otherwise noted.      Objective    /74   Pulse 94   Temp 98.3  F (36.8  C) (Tympanic)   Wt 101.6 kg (224 lb)   SpO2 97%   BMI 42.35 kg/m    Body mass index is 42.35 kg/m .  Physical Exam   GENERAL: alert and no distress  EYES: Eyes grossly normal to inspection, PERRL and conjunctivae and sclerae normal  HENT: normal cephalic/atraumatic, ear canals and TM's normal, nasal mucosa edematous , oropharynx clear, oral mucous membranes moist, and sinuses: maxillary, frontal tenderness on bilaterally  NECK: no adenopathy, no asymmetry, masses, or scars  RESP: lungs clear to auscultation - no rales, rhonchi or wheezes  MS: decreased range of motion tenderness left AC joint  SKIN: no suspicious lesions or rashes  NEURO: Normal strength and tone, mentation intact and speech normal  PSYCH: mentation appears normal, affect normal/bright    Xray - Reviewed and interpreted by me.  Negative for acute findings, read by Hilario TORREZ at time of visit.          Signed Electronically by: SHAN Sahu, CARMEN

## 2024-04-05 DIAGNOSIS — F41.8 MIXED ANXIETY AND DEPRESSIVE DISORDER: ICD-10-CM

## 2024-04-05 RX ORDER — HYDROXYZINE PAMOATE 25 MG/1
25 CAPSULE ORAL 3 TIMES DAILY PRN
Qty: 60 CAPSULE | Refills: 0 | Status: SHIPPED | OUTPATIENT
Start: 2024-04-05

## 2024-04-05 RX ORDER — CITALOPRAM HYDROBROMIDE 10 MG/1
10 TABLET ORAL DAILY
Qty: 45 TABLET | Refills: 0 | Status: SHIPPED | OUTPATIENT
Start: 2024-04-05 | End: 2024-04-26

## 2024-04-05 NOTE — TELEPHONE ENCOUNTER
"E-visit on 1/29/24:  \"Will start back on Celexa for now, with hydroxyzine for anxiety. Mental health assessment referral sent for ADHD testing. Plan to follow-up by phone/video visit in 4 weeks to see how she is feeling on current dose of celexa. \"    Patient sent mychat to schedule follow up visit.  PHQ9 and GAD7 sent.    Shabana Webber RN      "
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
## 2024-04-12 ENCOUNTER — OFFICE VISIT (OUTPATIENT)
Dept: ORTHOPEDICS | Facility: CLINIC | Age: 29
End: 2024-04-12
Attending: PHYSICIAN ASSISTANT
Payer: COMMERCIAL

## 2024-04-12 VITALS — SYSTOLIC BLOOD PRESSURE: 112 MMHG | DIASTOLIC BLOOD PRESSURE: 62 MMHG

## 2024-04-12 DIAGNOSIS — M54.2 NECK PAIN: ICD-10-CM

## 2024-04-12 DIAGNOSIS — M25.512 ARTHRALGIA OF BOTH ACROMIOCLAVICULAR JOINTS: ICD-10-CM

## 2024-04-12 DIAGNOSIS — M25.511 ARTHRALGIA OF BOTH ACROMIOCLAVICULAR JOINTS: ICD-10-CM

## 2024-04-12 DIAGNOSIS — M62.838 TRAPEZIUS MUSCLE SPASM: Primary | ICD-10-CM

## 2024-04-12 PROCEDURE — 99204 OFFICE O/P NEW MOD 45 MIN: CPT | Performed by: STUDENT IN AN ORGANIZED HEALTH CARE EDUCATION/TRAINING PROGRAM

## 2024-04-12 RX ORDER — CYCLOBENZAPRINE HCL 10 MG
10 TABLET ORAL 2 TIMES DAILY PRN
Qty: 28 TABLET | Refills: 1 | Status: SHIPPED | OUTPATIENT
Start: 2024-04-12

## 2024-04-12 NOTE — PATIENT INSTRUCTIONS
1. Trapezius muscle spasm    2. Neck pain    3. Arthralgia of both acromioclavicular joints      - Pain and tightness of paracervical spine, parascapular muscles and trapezius. Mild tenderness of right AC joint greater than left. No nerve signs and shoulder strength/range of motion intact less likely coming from spine or shoulder. Appears muscular in nature at this time. Xrays overall reassuring without concern for AC separation, fracture or bony abnormality. AC pain could also be ligament strain.   -Will start with conservative treatments at this time as see if has improvement in symptoms.   -Flexeril 10mg twice a day as needed to help with muscle spasm. Start after weaned from breast feeding in next week or so. Dicussed side effects of sedation/tiredness.   -Physical therapy ordered to help with strengthening.   Will recommend pain control and PT to help with strengthening. If not improved can consider further imaging or AC injection depending on imaging results. Can follow-up after physical therapy.       Please call [phone]  Ask for my team if you have any questions or concerns    Rufina Frank DO  [facility]      Thank you for choosing [facility]!    CLINIC LOCATIONS:     [place]  TRIAGE LINE: [phone] [address] [facility] APPOINTMENTS: [phone]   [place] [address] RADIOLOGY: [phone]   (Monday, Thursday & Friday) PHYSICAL THERAPY: [phone]    BILLING QUESTIONS: [phone]   [place] FAX: [phone] [address]    [place] [address]    (Wednesday)
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
## 2024-04-22 ENCOUNTER — TELEPHONE (OUTPATIENT)
Dept: MIDWIFE SERVICES | Facility: CLINIC | Age: 29
End: 2024-04-22

## 2024-04-22 ENCOUNTER — OFFICE VISIT (OUTPATIENT)
Dept: OBGYN | Facility: CLINIC | Age: 29
End: 2024-04-22
Payer: COMMERCIAL

## 2024-04-22 VITALS
DIASTOLIC BLOOD PRESSURE: 85 MMHG | SYSTOLIC BLOOD PRESSURE: 122 MMHG | WEIGHT: 233.5 LBS | HEART RATE: 83 BPM | TEMPERATURE: 97.8 F | BODY MASS INDEX: 44.14 KG/M2 | OXYGEN SATURATION: 97 %

## 2024-04-22 DIAGNOSIS — Z01.419 ENCOUNTER FOR GYNECOLOGICAL EXAMINATION WITHOUT ABNORMAL FINDING: Primary | ICD-10-CM

## 2024-04-22 PROCEDURE — 99459 PELVIC EXAMINATION: CPT

## 2024-04-22 PROCEDURE — 99212 OFFICE O/P EST SF 10 MIN: CPT

## 2024-04-22 NOTE — PROGRESS NOTES
CC: retained tampon  S: Mally is a 27 yo  here today with concern of retained tampon  -She placed appx 10 pm lastnight   -this morning was unable to locate strings  -no discharge, dysuria, constipation  -no fever, abd pain, or foul smell  -periods normal  -utd on pap no contracpetion    O:/85   Pulse 83   Temp 97.8  F (36.6  C) (Oral)   Wt 105.9 kg (233 lb 8 oz)   LMP 2024   SpO2 97%   Breastfeeding Yes   BMI 44.14 kg/m    Past Medical History:   Diagnosis Date    Chlamydia     Gonorrhea     Varicella      Past Surgical History:   Procedure Laterality Date    ENDOSCOPIC RETROGRADE CHOLANGIOPANCREATOGRAPHY      LAPAROSCOPIC CHOLECYSTECTOMY  2017    wisdom teeth removal  2018     Current Outpatient Medications   Medication Sig Dispense Refill    citalopram (CELEXA) 10 MG tablet Take 1 tablet (10 mg) by mouth daily 45 tablet 0    cyclobenzaprine (FLEXERIL) 10 MG tablet Take 1 tablet (10 mg) by mouth 2 times daily as needed for muscle spasms 28 tablet 1    hydrOXYzine ayla (VISTARIL) 25 MG capsule Take 1 capsule (25 mg) by mouth 3 times daily as needed for anxiety 60 capsule 0    ibuprofen (ADVIL/MOTRIN) 600 MG tablet Take 1 tablet (600 mg) by mouth every 6 hours as needed for moderate pain 30 tablet 0     No current facility-administered medications for this visit.        Allergies   Allergen Reactions    Latex Rash    Adhesive Tape Rash     rash     Alert very pleasant female NAD  RRR  Normal bp and pulse  Pelvic Exam:  Vulva: No external lesions, normal hair distribution, no adenopathy  Vagina: Moist, pink, no abnormal discharge scant amounts of dark period blood, well rugated, no lesions  Cervix: parous, smooth, pink, no visible lesions  Uterus: Normal size, anteverted, non-tender, mobile  Ovaries: No mass, non-tender, mobile  Thorough visualization with speculum did not reveal any retained tampon, bimanual pelvic exam did not reveal any retained tampon     A/P:  1. Encounter for  gynecological examination without abnormal finding    - DC PELVIC EXAMINATION    Mally is a 27 yo  here today with concern of retained tampon  -discussed inability to visualize any presence of retained tampon with speculum as well as manual sweep  -discussed potential removal when getting up to void in middle of night vs coming out with heavy flow in toilet  -rtc for foul smelling discharge, abd pain, fever, pain or any concern  ROSENDO Mirza CNP

## 2024-04-22 NOTE — TELEPHONE ENCOUNTER
Caller reporting the following red-flag symptom(s): Pt believes she has a tampon inside of her.     Per the system red-flag symptom policy, patient was instructed to:  speak with a Registered Nurse    Action:  Patient warm transferred to a Registered Nurse

## 2024-04-22 NOTE — TELEPHONE ENCOUNTER
Pt called stating that she placed a tampon at 10 pm last night and was unable to find it this am.  No strings are visible and cannot feel the tampon itself.  Pt scheduled with JAY.

## 2024-04-26 ENCOUNTER — VIRTUAL VISIT (OUTPATIENT)
Dept: MIDWIFE SERVICES | Facility: CLINIC | Age: 29
End: 2024-04-26
Payer: COMMERCIAL

## 2024-04-26 DIAGNOSIS — F41.1 GENERALIZED ANXIETY DISORDER: Primary | ICD-10-CM

## 2024-04-26 DIAGNOSIS — E66.01 SEVERE OBESITY (BMI >= 40) (H): ICD-10-CM

## 2024-04-26 DIAGNOSIS — R41.840 ATTENTION AND CONCENTRATION DEFICIT: ICD-10-CM

## 2024-04-26 PROCEDURE — 99213 OFFICE O/P EST LOW 20 MIN: CPT | Mod: 95 | Performed by: ADVANCED PRACTICE MIDWIFE

## 2024-04-26 NOTE — PROGRESS NOTES
Virtual Visit Details    Type of service:  Video Visit     Originating Location (pt. Location): Other Workplace in [place]    Distant Location (provider location):  On-site  Platform used for Video Visit: Amos SEYMOUR: Mally is a 27yo , being seen for check in on her mental health, and celexa prescription. Mally stopped taking her medication this week, as she has noted increased weight gain, sweating, and fatigue. She is open to trying a different medication. She is still breastfeeding her 10 month old son. She would like something compatible with that, as she would like to breastfeed until at least 1 year. She notes that her mood has been good, but she is not liking the side effects. She is also interested in possible medication intervention for weight management, and would like referral for ADHD testing. She has noted more difficulty with attention/concentration.     O: no VS due to video visit  Exam:  Constitutional: healthy, alert, and no distress  Psychiatric: mentation appears normal and affect normal/bright    A/P:  (F41.1) Generalized anxiety disorder  (primary encounter diagnosis)  Plan: sertraline (ZOLOFT) 50 MG tablet        Will trial zoloft. She has been on it in the past, but not sure how well it worked for her. It is safe with breastfeeding.    (E66.01) Severe obesity (BMI >= 40) (H)  Plan: Adult Comprehensive Weight Management         Referral    (R41.840) Attention and concentration deficit  Plan: Adult Mental Health  Referral    Follow up in 4-8 weeks. May need dose increase if mood is not stable.   Return to care for further needs.    Denver Krueger CNM      Video visit start:1:21p  Video visit end: 1:44p  Total time: 23 min
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
## 2024-05-03 ENCOUNTER — THERAPY VISIT (OUTPATIENT)
Dept: PHYSICAL THERAPY | Facility: CLINIC | Age: 29
End: 2024-05-03
Attending: STUDENT IN AN ORGANIZED HEALTH CARE EDUCATION/TRAINING PROGRAM
Payer: COMMERCIAL

## 2024-05-03 DIAGNOSIS — M54.42 CHRONIC BILATERAL LOW BACK PAIN WITH LEFT-SIDED SCIATICA: Primary | ICD-10-CM

## 2024-05-03 DIAGNOSIS — G89.29 CHRONIC BILATERAL LOW BACK PAIN WITH LEFT-SIDED SCIATICA: Primary | ICD-10-CM

## 2024-05-03 DIAGNOSIS — M62.838 TRAPEZIUS MUSCLE SPASM: ICD-10-CM

## 2024-05-03 DIAGNOSIS — M25.511 ARTHRALGIA OF BOTH ACROMIOCLAVICULAR JOINTS: ICD-10-CM

## 2024-05-03 DIAGNOSIS — M25.512 ARTHRALGIA OF BOTH ACROMIOCLAVICULAR JOINTS: ICD-10-CM

## 2024-05-03 DIAGNOSIS — M54.2 NECK PAIN: ICD-10-CM

## 2024-05-03 PROBLEM — M54.40 CHRONIC BILATERAL LOW BACK PAIN WITH SCIATICA: Status: ACTIVE | Noted: 2024-05-03

## 2024-05-03 PROCEDURE — 97161 PT EVAL LOW COMPLEX 20 MIN: CPT | Mod: GP | Performed by: PHYSICAL THERAPIST

## 2024-05-03 PROCEDURE — 97530 THERAPEUTIC ACTIVITIES: CPT | Mod: GP | Performed by: PHYSICAL THERAPIST

## 2024-05-03 PROCEDURE — 97110 THERAPEUTIC EXERCISES: CPT | Mod: GP | Performed by: PHYSICAL THERAPIST

## 2024-05-23 ENCOUNTER — OFFICE VISIT (OUTPATIENT)
Dept: SLEEP MEDICINE | Facility: CLINIC | Age: 29
End: 2024-05-23
Payer: COMMERCIAL

## 2024-05-23 VITALS — HEIGHT: 61 IN | OXYGEN SATURATION: 98 % | WEIGHT: 232 LBS | BODY MASS INDEX: 43.8 KG/M2 | HEART RATE: 79 BPM

## 2024-05-23 DIAGNOSIS — G47.00 INSOMNIA, UNSPECIFIED TYPE: ICD-10-CM

## 2024-05-23 DIAGNOSIS — G47.33 OSA (OBSTRUCTIVE SLEEP APNEA): Primary | ICD-10-CM

## 2024-05-23 DIAGNOSIS — R79.0 LOW SERUM FERRITIN LEVEL: ICD-10-CM

## 2024-05-23 DIAGNOSIS — E66.01 MORBID OBESITY WITH BMI OF 40.0-44.9, ADULT (H): ICD-10-CM

## 2024-05-23 PROCEDURE — 99215 OFFICE O/P EST HI 40 MIN: CPT | Performed by: NURSE PRACTITIONER
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
## 2024-05-23 NOTE — PATIENT INSTRUCTIONS
Recommendations:  - Try lower dose melatonin 1-3 mg approx 3 hours prior to bedtime  - Go to bed only when sleepy      Your BMI is Body mass index is 43.86 kg/m .    What is BMI?  Body mass index (BMI) is one way to tell whether you are at a healthy weight, overweight, or obese. It measures your weight in relation to your height.  A BMI of 18.5 to 24.9 is in the healthy range. A person with a BMI of 25 to 29.9 is considered overweight, and someone with a BMI of 30 or greater is considered obese.  Another way to find out if you are at risk for health problems caused by overweight and obesity is to measure your waist. If you are a woman and your waist is more than 35 inches, or if you are a man and your waist is more than 40 inches, your risk of disease may be higher.  More than two-thirds of American adults are considered overweight or obese. Being overweight or obese increases the risk for further weight gain.  Excess weight may lead to heart disease and diabetes. Creating and following plans for healthy eating and physical activity may help you improve your health.    Methods for maintaining or losing weight.  Weight control is part of healthy lifestyle and includes exercise, emotional health, and healthy eating habits.  Careful eating habits lifelong is the mainstay of weight control.  Though there are significant health benefits from weight loss, long-term weight loss with diet alone may be very difficult to achieve- studies show long-term success with dietary management in less than 10% of people. Attaining a healthy weight may be especially difficult to achieve in those with severe obesity. In some cases, medications, devices and surgical management might be considered.    What can you do?  If you are overweight or obese and are interested in methods for weight loss, you should discuss this with your provider. In addition, we recommend that you review healthy life styles and methods for weight loss available  through the National Institutes of Health patient information sites:   http://win.niddk.nih.gov/publications/index.htm

## 2024-06-07 ENCOUNTER — THERAPY VISIT (OUTPATIENT)
Dept: PHYSICAL THERAPY | Facility: CLINIC | Age: 29
End: 2024-06-07
Payer: COMMERCIAL

## 2024-06-07 DIAGNOSIS — M54.42 CHRONIC BILATERAL LOW BACK PAIN WITH LEFT-SIDED SCIATICA: ICD-10-CM

## 2024-06-07 DIAGNOSIS — M54.2 NECK PAIN: Primary | ICD-10-CM

## 2024-06-07 DIAGNOSIS — G89.29 CHRONIC BILATERAL LOW BACK PAIN WITH LEFT-SIDED SCIATICA: ICD-10-CM

## 2024-06-07 PROCEDURE — 97530 THERAPEUTIC ACTIVITIES: CPT | Mod: GP | Performed by: PHYSICAL THERAPIST

## 2024-06-07 PROCEDURE — 97110 THERAPEUTIC EXERCISES: CPT | Mod: GP | Performed by: PHYSICAL THERAPIST

## 2024-06-14 ENCOUNTER — THERAPY VISIT (OUTPATIENT)
Dept: PHYSICAL THERAPY | Facility: CLINIC | Age: 29
End: 2024-06-14
Payer: COMMERCIAL

## 2024-06-14 DIAGNOSIS — M54.2 NECK PAIN: Primary | ICD-10-CM

## 2024-06-14 DIAGNOSIS — M54.42 CHRONIC BILATERAL LOW BACK PAIN WITH LEFT-SIDED SCIATICA: ICD-10-CM

## 2024-06-14 DIAGNOSIS — G89.29 CHRONIC BILATERAL LOW BACK PAIN WITH LEFT-SIDED SCIATICA: ICD-10-CM

## 2024-06-14 PROCEDURE — 97110 THERAPEUTIC EXERCISES: CPT | Mod: GP | Performed by: PHYSICAL THERAPIST

## 2024-06-14 PROCEDURE — 97530 THERAPEUTIC ACTIVITIES: CPT | Mod: GP | Performed by: PHYSICAL THERAPIST

## 2024-06-27 ENCOUNTER — OFFICE VISIT (OUTPATIENT)
Dept: URGENT CARE | Facility: URGENT CARE | Age: 29
End: 2024-06-27
Payer: COMMERCIAL

## 2024-06-27 VITALS
HEART RATE: 74 BPM | SYSTOLIC BLOOD PRESSURE: 115 MMHG | TEMPERATURE: 97.2 F | DIASTOLIC BLOOD PRESSURE: 80 MMHG | WEIGHT: 228 LBS | BODY MASS INDEX: 43.1 KG/M2 | OXYGEN SATURATION: 98 %

## 2024-06-27 DIAGNOSIS — T14.8XXD ENCOUNTER FOR RE-CHECK OF LACERATION WOUND: Primary | ICD-10-CM

## 2024-06-27 PROCEDURE — 99213 OFFICE O/P EST LOW 20 MIN: CPT | Performed by: PHYSICIAN ASSISTANT

## 2024-06-27 NOTE — PROGRESS NOTES
Assessment & Plan     Re-check of laceration wound   [place] discission between writer and patient to continue to treat with over the counter Neosporin at this time. Red flag symptoms discussed.     Return in about 7 years (around 6/27/2031), or PCP with syptoms do not improve or worsens.  You can shower if your doctor okays it. Pat the cut dry.  Don't soak the cut, such as in a bathtub.   clean the cut with soap and water 2 times a day unless your doctor gives you different instructions.  Don't use hydrogen peroxide or alcohol, which can slow healing.  You may cover the cut with a thin layer of petroleum jelly and a nonstick bandage.  Avoid any activity that could cause your cut to reopen.    Karley Irvin, Student NP   [facility] [place]    Kaylee Bal is a 28 year old female who presents to clinic today for the following health issues:  Chief Complaint   Patient presents with    Urgent Care     Cut on right wrist from a knife 1 week ago looks infected.       HPI    28 year old female who presents to  with laceration to right medial forarm 1 week ago. Pt was emptying  and cut right forearm on a knife. Laceration was superficial and noticed purulent drainage and redness to right forearm laceration 2 days ago. OTC neosporin used today.    Denies fever, chills, nausea, vomiting, red streak from wound    Laceration    Mechanism of injury: knife cut while emptying    History provided by: Patient  Time of injury was 1 week ago.    This is a non-work related and accidental injury.    Associated symptoms: Denies numbness, weakness, or loss of function    Last tetanus booster within 10 years: Yes    LACERATION EXAM:   Size of laceration: 2.5 centimeters  Characteristics of the laceration: swollen, erythema to wound edge    Depth of laceration: deeper upon injury but now are as more superficial  Tendon function intact: No  Sensation to light touch intact:  Yes  Pulses/capillary refill intact: Yes  Foreign body: No    Picture included in patient's chart: no      Review of Systems  Constitutional, HEENT, cardiovascular, pulmonary, gi and gu systems are negative, except as otherwise noted. See HPI       Objective    /80   Pulse 74   Temp 97.2  F (36.2  C) (Temporal)   Wt 103.4 kg (228 lb)   SpO2 98%   BMI 43.10 kg/m      Physical Exam   GENERAL: alert and no distress  NECK: no adenopathy, no asymmetry, masses, or scars  RESP: lungs clear to auscultation - no rales, rhonchi or wheezes  SKIN: no suspicious lesions or rashes and 2.5 cm laceration with scabbing, erythema to laceration edge, no drainage noted upon palpitation.

## 2024-06-28 NOTE — PROGRESS NOTES
I have reviewed and confirmed the review of systems, HPI, and past/family and medical history as documented by the NP/PA student. I have seen and evaluated the patient. I agree with the resident s note as documented. Any changes/amendments I have made will be listed below.     -Hilario Palafox PA-C

## 2024-07-14 ENCOUNTER — HEALTH MAINTENANCE LETTER (OUTPATIENT)
Age: 29
End: 2024-07-14

## 2024-07-28 ENCOUNTER — OFFICE VISIT (OUTPATIENT)
Dept: URGENT CARE | Facility: URGENT CARE | Age: 29
End: 2024-07-28
Payer: COMMERCIAL

## 2024-07-28 VITALS
DIASTOLIC BLOOD PRESSURE: 82 MMHG | RESPIRATION RATE: 16 BRPM | TEMPERATURE: 97.2 F | SYSTOLIC BLOOD PRESSURE: 115 MMHG | HEART RATE: 95 BPM | OXYGEN SATURATION: 97 %

## 2024-07-28 DIAGNOSIS — L03.012 PARONYCHIA OF FINGER, LEFT: Primary | ICD-10-CM

## 2024-07-28 PROCEDURE — 99213 OFFICE O/P EST LOW 20 MIN: CPT | Performed by: PHYSICIAN ASSISTANT

## 2024-07-28 RX ORDER — SACCHAROMYCES BOULARDII 250 MG
250 CAPSULE ORAL 2 TIMES DAILY
Qty: 28 CAPSULE | Refills: 0 | Status: SHIPPED | OUTPATIENT
Start: 2024-07-28 | End: 2024-08-11

## 2024-07-28 NOTE — PROGRESS NOTES
SUBJECTIVE:  Mally Evans is a 28 year old female who presents to the clinic today for a rash.  Onset of rash was 4 day(s) ago.   Rash is gradual onset.  Location of the rash: finger.  Quality/symptoms of rash: burning and painful   Symptoms are mild and rash seems to be worsening.  Previous history of a similar rash? No  Recent exposure history: none known    Associated symptoms include: nothing.    Past Medical History:   Diagnosis Date    Chlamydia     Gonorrhea     Varicella      Current Outpatient Medications   Medication Sig Dispense Refill    cyclobenzaprine (FLEXERIL) 10 MG tablet Take 1 tablet (10 mg) by mouth 2 times daily as needed for muscle spasms 28 tablet 1    hydrOXYzine ayla (VISTARIL) 25 MG capsule Take 1 capsule (25 mg) by mouth 3 times daily as needed for anxiety 60 capsule 0    sertraline (ZOLOFT) 50 MG tablet Take 1 tablet (50 mg) by mouth daily 60 tablet 1     Social History     Tobacco Use    Smoking status: Former     Current packs/day: 0.00     Types: Cigarettes     Quit date: 10/2020     Years since quitting: 3.8    Smokeless tobacco: Current    Tobacco comments:     vape   Substance Use Topics    Alcohol use: Not Currently     Comment: seldom       ROS:  Review of systems negative except as stated above.    EXAM:   /82 (BP Location: Right arm)   Pulse 95   Temp 97.2  F (36.2  C) (Temporal)   Resp 16   SpO2 97%   Breastfeeding No   SKIN: inflammation nail fold left middle finger  GENERAL APPEARANCE: healthy, alert and no distress      ASSESSMENT:  (L03.012) Paronychia of finger, left  (primary encounter diagnosis)  Plan: amoxicillin-clavulanate (AUGMENTIN) 875-125 MG         tablet, saccharomyces boulardii (FLORASTOR) 250        MG capsule     Follow up with PCP if symptoms worsen or fail to improve

## 2024-07-31 ENCOUNTER — OFFICE VISIT (OUTPATIENT)
Dept: URGENT CARE | Facility: URGENT CARE | Age: 29
End: 2024-07-31
Payer: COMMERCIAL

## 2024-07-31 VITALS
WEIGHT: 230 LBS | SYSTOLIC BLOOD PRESSURE: 122 MMHG | DIASTOLIC BLOOD PRESSURE: 84 MMHG | RESPIRATION RATE: 16 BRPM | HEART RATE: 77 BPM | HEIGHT: 61 IN | TEMPERATURE: 97.5 F | OXYGEN SATURATION: 98 % | BODY MASS INDEX: 43.43 KG/M2

## 2024-07-31 DIAGNOSIS — L03.012 PARONYCHIA OF FINGER, LEFT: Primary | ICD-10-CM

## 2024-07-31 PROCEDURE — 10060 I&D ABSCESS SIMPLE/SINGLE: CPT | Mod: F2 | Performed by: INTERNAL MEDICINE

## 2024-07-31 PROCEDURE — 99207 PR NO CHARGE LOS: CPT | Performed by: INTERNAL MEDICINE

## 2024-07-31 NOTE — PROGRESS NOTES
"ASSESSMENT AND PLAN:      ICD-10-CM    1. Paronychia of finger, left  L03.012 DRAIN SKIN ABSCESS SIMPLE/SINGLE        Patient Instructions     Wound care discussed  Return if symptoms worsen or fail to improve.        Zarina Magana MD  [facility]    Subjective     Mally Evans is a 28 year old who presents for Patient presents with:  Finger: Left hand third finger pain, swelling and fluid build up around nail   Urgent Care: Was seen 7/28 and told to return if fluid build up happens     an established patient of Replaced by [facility].      Onset of symptoms was 1 week(s) ago.  Course of illness is worsening.    Seen 7/28 treatment augmentin    Location: left middle finger  Context: picks hang nailes  Current and Associated symptoms: redness, swelling, and pain  No drainage  Treatment measures tried include: antibiotics  Relief from treatment: none  Significant past medical history: N/A      Review of Systems        Objective    /84   Pulse 77   Temp 97.5  F (36.4  C) (Temporal)   Resp 16   Ht 1.549 m (5' 1\")   Wt 104.3 kg (230 lb)   SpO2 98%   BMI 43.46 kg/m    Physical Exam  Vitals reviewed.   Constitutional:       Appearance: Normal appearance.   Musculoskeletal:      Comments: left 3rd digit  Redness swelling with nail bed area with white pus   Neurological:      Mental Status: She is alert.        Procedure notes  Cleanse betadine  Ethyl chloride  11 blade incision  Purulent fluid expressed              "

## 2024-11-04 ENCOUNTER — VIRTUAL VISIT (OUTPATIENT)
Dept: PSYCHOLOGY | Facility: CLINIC | Age: 29
End: 2024-11-04
Attending: ADVANCED PRACTICE MIDWIFE
Payer: COMMERCIAL

## 2024-11-04 DIAGNOSIS — F33.1 MAJOR DEPRESSIVE DISORDER, RECURRENT EPISODE, MODERATE (H): ICD-10-CM

## 2024-11-04 DIAGNOSIS — Z13.39 ATTENTION DEFICIT HYPERACTIVITY DISORDER (ADHD) EVALUATION: ICD-10-CM

## 2024-11-04 DIAGNOSIS — F41.1 GENERALIZED ANXIETY DISORDER: Primary | ICD-10-CM

## 2024-11-04 PROCEDURE — 90834 PSYTX W PT 45 MINUTES: CPT | Mod: 95 | Performed by: PSYCHOLOGIST

## 2024-11-04 ASSESSMENT — ANXIETY QUESTIONNAIRES
8. IF YOU CHECKED OFF ANY PROBLEMS, HOW DIFFICULT HAVE THESE MADE IT FOR YOU TO DO YOUR WORK, TAKE CARE OF THINGS AT HOME, OR GET ALONG WITH OTHER PEOPLE?: VERY DIFFICULT
7. FEELING AFRAID AS IF SOMETHING AWFUL MIGHT HAPPEN: SEVERAL DAYS
GAD7 TOTAL SCORE: 13
5. BEING SO RESTLESS THAT IT IS HARD TO SIT STILL: SEVERAL DAYS
GAD7 TOTAL SCORE: 13
IF YOU CHECKED OFF ANY PROBLEMS ON THIS QUESTIONNAIRE, HOW DIFFICULT HAVE THESE PROBLEMS MADE IT FOR YOU TO DO YOUR WORK, TAKE CARE OF THINGS AT HOME, OR GET ALONG WITH OTHER PEOPLE: VERY DIFFICULT
6. BECOMING EASILY ANNOYED OR IRRITABLE: NEARLY EVERY DAY
1. FEELING NERVOUS, ANXIOUS, OR ON EDGE: MORE THAN HALF THE DAYS
4. TROUBLE RELAXING: MORE THAN HALF THE DAYS
2. NOT BEING ABLE TO STOP OR CONTROL WORRYING: MORE THAN HALF THE DAYS
GAD7 TOTAL SCORE: 13
7. FEELING AFRAID AS IF SOMETHING AWFUL MIGHT HAPPEN: SEVERAL DAYS
3. WORRYING TOO MUCH ABOUT DIFFERENT THINGS: MORE THAN HALF THE DAYS

## 2024-11-04 ASSESSMENT — PATIENT HEALTH QUESTIONNAIRE - PHQ9
SUM OF ALL RESPONSES TO PHQ QUESTIONS 1-9: 14
10. IF YOU CHECKED OFF ANY PROBLEMS, HOW DIFFICULT HAVE THESE PROBLEMS MADE IT FOR YOU TO DO YOUR WORK, TAKE CARE OF THINGS AT HOME, OR GET ALONG WITH OTHER PEOPLE: VERY DIFFICULT
SUM OF ALL RESPONSES TO PHQ QUESTIONS 1-9: 14

## 2024-11-04 ASSESSMENT — COLUMBIA-SUICIDE SEVERITY RATING SCALE - C-SSRS
TOTAL  NUMBER OF ABORTED OR SELF INTERRUPTED ATTEMPTS LIFETIME: NO
2. HAVE YOU ACTUALLY HAD ANY THOUGHTS OF KILLING YOURSELF?: NO
6. HAVE YOU EVER DONE ANYTHING, STARTED TO DO ANYTHING, OR PREPARED TO DO ANYTHING TO END YOUR LIFE?: NO
1. HAVE YOU WISHED YOU WERE DEAD OR WISHED YOU COULD GO TO SLEEP AND NOT WAKE UP?: NO
ATTEMPT LIFETIME: NO
TOTAL  NUMBER OF INTERRUPTED ATTEMPTS LIFETIME: NO

## 2024-11-04 NOTE — PROGRESS NOTES
[facility]   Mental Health & Addiction Services     Progress Note - Initial Visit    Client Name:  Mally Evans Date: 2024         Service Type: Individual     Visit Start Time: 11:00am  Visit End Time: 11:41am    Visit #: 1    Attendees: Client attended alone    Service Modality:  Video Visit:      Provider verified identity through the following two step process.  Patient provided:  Patient  and Patient address    Telemedicine Visit: The patient's condition can be safely assessed and treated via synchronous audio and visual telemedicine encounter.      Reason for Telemedicine Visit: Services only offered telehealth    Originating Site (Patient Location): Patient's place of employment    Distant Site (Provider Location): Provider Remote Setting- Home Office    Consent:  The patient/guardian has verbally consented to: the potential risks and benefits of telemedicine (video visit) versus in person care; bill my insurance or make self-payment for services provided; and responsibility for payment of non-covered services.     Patient would like the video invitation sent by:  Send to e-mail at: [email or web address]    Mode of Communication:  Video Conference via [facility]    Distant Location (Provider):  Off-site    As the provider I attest to compliance with applicable laws and regulations related to telemedicine.       DATA:  Extended Session (53+ minutes): No  Interactive Complexity: No   Crisis: No     Presenting Concerns/Current Stressors:   Patient presented to session to initiate the ADHD evaluation process.           2023     2:45 PM 2024     1:30 PM 2024     9:37 AM   PHQ   PHQ-9 Total Score 3 16 14    Q9: Thoughts of better off dead/self-harm past 2 weeks Not at all  Several days  Not at all    F/U: Thoughts of suicide or self-harm  No     F/U: Safety concerns  No         Patient-reported            10/23/2023     2:27 PM 2024     1:30 PM 2024      "9:54 AM   JENNY-7 SCORE   Total Score 12 (moderate anxiety) 14 (moderate anxiety) 13 (moderate anxiety)   Total Score 12 14 13        Patient-reported       ASSESSMENT:  Mental Status Assessment:  Appearance:   Appropriate   Eye Contact:   Good   Psychomotor Behavior: Normal   Attitude:   Cooperative   Orientation:   All  Speech   Rate / Production: Normal/ Responsive   Volume:  Normal   Mood:    Normal  Affect:    Appropriate   Thought Content:  Clear   Thought Form:  Logical   Insight:    Good       Safety Issues and Plan for Safety and Risk Management:   [place] Suicide Severity Rating Scale (Lifetime/Recent)      2/20/2023    10:21 AM 11/4/2024    11:04 AM   [place] Suicide Severity Rating (Lifetime/Recent)   Q1 Wish to be Dead (Lifetime) Y N   Wish to be Dead Description (Lifetime) Described occasional passive thoughts such as \"I wish god would take me\" when feeling highly distressed. She adamantly denied any desire to harm herself or end her life and does not identify these thoughts as suicidal ideation.    1. Wish to be Dead (Past 1 Month) Y    Wish to be Dead Description (Past 1 Month) See above.    Q2 Non-Specific Active Suicidal Thoughts (Lifetime) N N   Description of Most Severe Ideation (Lifetime) N/a - patient does not identify thoughts as suicidal ideation    Description of Most Severe Ideation (Past 1 Month) N/a - see above    Frequency (Past 1 Month) 3    Duration (Lifetime) 1    Duration (Past 1 Month) 1    Deterrents (Lifetime) 0    Deterrents (Past 1 Month) 0    Reasons for Ideation (Lifetime) 0    Reasons for Ideation (Past 1 Month) 0    Actual Attempt (Lifetime) N N   Has subject engaged in non-suicidal self-injurious behavior? (Lifetime) N N   Interrupted Attempts (Lifetime) N N   Aborted or Self-Interrupted Attempt (Lifetime) N N   Preparatory Acts or Behavior (Lifetime) N N   Calculated C-SSRS Risk Score (Lifetime/Recent) Low Risk No Risk Indicated     Patient denies current fears or " concerns for personal safety.  Patient denies current or recent suicidal ideation or behaviors.  Patient denies current or recent homicidal ideation or behaviors.  Patient denies current or recent self injurious behavior or ideation.  Patient denies other safety concerns.  Recommended that patient call 911 or go to the local ED should there be a change in any of these risk factors   Patient reports there are no firearms in the house.    Diagnostic Criteria:  F41.1:  A. Excessive anxiety and worry, occurring more days than not for at least 6 months about a number of events or activities.   B. The individual finds it difficult to control the worry.  C. The anxiety and worry are associated with 3 or more of 6 symptoms.  D. The anxiety, worry, or physical symptoms cause clinically significant distress or impairment in social, occupational, or other important areas of functioning.  E. The disturbance is not attributable to the physiological effects of a substance (e.g., a drug of abuse, a medication) or another medical condition (e.g., hyperthyroidism).  F. The disturbance is not better explained by another mental disorder (e.g., anxiety or worry about having panic attacks in panic disorder, negative evaluation in social anxiety disorder [social phobia], contamination or other obsessions in obsessive-compulsive disorder, separation from attachment figures in separation anxiety disorder, reminders of traumatic events in posttraumatic stress disorder, gaining weight in anorexia nervosa, physical complaints in somatic symptom disorder, perceived appearance flaws in body dysmorphic disorder, having a serious illness in illness anxiety disorder, or the content of delusional beliefs in schizophrenia or delusional disorder).    F33.1:  A. Five (or more) symptoms have been present during the same 2-week period and represent a change from previous functioning; at least one of the symptoms is either (1) depressed mood or (2) loss  of interest or pleasure.   Depressed mood.   Diminished interest or pleasure in all, or almost all, activities.   Significant appetite change.  Significant sleep change.   Fatigue or loss of energy.   Feelings of worthlessness or inappropriate guilt.   Diminished ability to think or concentrate, or indecisiveness.   Psychomotor agitation or lethargy.   B. The symptoms cause clinically significant distress or impairment in social, occupational, or other important areas of functioning.  C. The episode is not attributable to the physiological effects of a substance or to another medical condition.  D. The occurence of major depressive episode is not better explained by other thought / psychotic disorders.  E. There has never been a manic episode or hypomanic episode.       DSM5 Diagnoses: (Sustained by DSM5 Criteria Listed Above)  Diagnoses:   1. Generalized anxiety disorder    2. Major depressive disorder, recurrent episode, moderate (H)    3. Attention deficit hyperactivity disorder (ADHD) evaluation      Psychosocial & Contextual Factors: some social support, employed, in college, single parent    PROMIS-10 Scores  Global Mental Health Score: (Patient-Rptd) (P) 7  Global Physical Health Score: (Patient-Rptd) (P) 14   PROMIS TOTAL - SUBSCORES: (Patient-Rptd) (P) 21      Intervention:              Reviewed symptoms and history of presenting concern. Patient endorsed symptoms consistent with depression , anxiety , trauma, and ADHD. Denied full criteria for PTSD. Patient denied symptoms associated with korey, panic, OCD, and perceptual difficulties. Unable to complete diagnostic intake, will be completed in next session.    CBT: socratic questioning, positive reinforcement  EFT: empathetic attunement, emotion checking, emotion naming  MI: open ended questions, affirmations, reflections        Attendance Agreement:  Client has not signed the attendance agreement. Discussed expectations at beginning of this first session  and patient agreed.       PLAN:  Provider will continue Diagnostic Assessment in next session. Patient will complete Chandler questionnaires  and CNS Vital Signs prior to next session (11/20/2024).    Patient meets the following risk assessment and triage: Patient denied any current/recent/lifetime history of suicidal ideation and/or behaviors.  No safety plan indicated at this time.     Medical necessity criteria is warranted in order to: Measure a psychological disorder and its severity and functional impairment to determine psychiatric diagnosis when a mental illness is suspected, or to achieve a differential diagnosis from a range of medical/psychological disorders that present with similar constellations of symptoms (e.g., determination and measurement of anxiety severity and impact in the presence of ongoing asthma or heart disease), Perform symptom measurement to objectively measure treatment effectiveness and/or determine the need to refer for pharmacological treatment or other medical evaluation (e.g., based on severity and chronicity of symptoms), and Evaluate primary symptoms of impaired attention and concentration that can occur in many neurological and psychiatric conditions.    Medical necessity for psychological assessment is warranted as a result of the following: (1) A specific clinical question is posed that relates to the condition/symptoms being addressed (2) The question cannot be adequately addressed by clinical interview and/or behavioral observation (3) Results of psychological testing are reasonably expected to provide an answer to the query (4) It is reasonably expected that the testing will provide information leading to a clearer diagnosis and/or guide treatment planning with an expectation of improved clinical outcome.    I acknowledge that, based upon current clinical information, the patient and I have reviewed and discussed issues pertaining to the purpose of therapy/testing, potential  therapeutic goals, procedures, risks and benefits, and estimated duration of therapy/testing. Issues pertaining to fees/insurance and confidentiality were also addressed with the patient, who indicated understanding and elected to continue with appointments. I will not be providing any experimental procedures and, if we agree that a change in clinical procedure would be more beneficial, I will obtain specific consent for that procedure or refer you to another provider who has expertise in that area.       Lynn Fowler PsyD,   Clinical Psychologist

## 2024-11-11 ENCOUNTER — LAB (OUTPATIENT)
Dept: LAB | Facility: CLINIC | Age: 29
End: 2024-11-11
Attending: ADVANCED PRACTICE MIDWIFE
Payer: COMMERCIAL

## 2024-11-11 ENCOUNTER — OFFICE VISIT (OUTPATIENT)
Dept: OBGYN | Facility: CLINIC | Age: 29
End: 2024-11-11
Attending: ADVANCED PRACTICE MIDWIFE
Payer: COMMERCIAL

## 2024-11-11 VITALS
HEIGHT: 60 IN | HEART RATE: 69 BPM | SYSTOLIC BLOOD PRESSURE: 114 MMHG | BODY MASS INDEX: 41.7 KG/M2 | DIASTOLIC BLOOD PRESSURE: 77 MMHG | WEIGHT: 212.4 LBS

## 2024-11-11 DIAGNOSIS — Z00.00 VISIT FOR ANNUAL HEALTH EXAMINATION: ICD-10-CM

## 2024-11-11 DIAGNOSIS — R21 RASH AND NONSPECIFIC SKIN ERUPTION: ICD-10-CM

## 2024-11-11 DIAGNOSIS — Z80.3 FAMILY HISTORY OF MALIGNANT NEOPLASM OF BREAST: ICD-10-CM

## 2024-11-11 DIAGNOSIS — N76.0 VAGINITIS AND VULVOVAGINITIS: ICD-10-CM

## 2024-11-11 DIAGNOSIS — Z00.00 VISIT FOR ANNUAL HEALTH EXAMINATION: Primary | ICD-10-CM

## 2024-11-11 PROBLEM — Z23 NEED FOR TDAP VACCINATION: Status: RESOLVED | Noted: 2022-12-22 | Resolved: 2024-11-11

## 2024-11-11 LAB
BACTERIAL VAGINOSIS VAG-IMP: NEGATIVE
CANDIDA DNA VAG QL NAA+PROBE: NOT DETECTED
CANDIDA GLABRATA / CANDIDA KRUSEI DNA: NOT DETECTED
CHOLEST SERPL-MCNC: 173 MG/DL
EST. AVERAGE GLUCOSE BLD GHB EST-MCNC: 108 MG/DL
FASTING STATUS PATIENT QL REPORTED: NO
FASTING STATUS PATIENT QL REPORTED: NO
GLUCOSE SERPL-MCNC: 97 MG/DL (ref 70–99)
HBA1C MFR BLD: 5.4 %
HDLC SERPL-MCNC: 43 MG/DL
LDLC SERPL CALC-MCNC: 105 MG/DL
NONHDLC SERPL-MCNC: 130 MG/DL
T VAGINALIS DNA VAG QL NAA+PROBE: NOT DETECTED
TRIGL SERPL-MCNC: 123 MG/DL
TSH SERPL DL<=0.005 MIU/L-ACNC: 1.05 UIU/ML (ref 0.3–4.2)

## 2024-11-11 PROCEDURE — 80061 LIPID PANEL: CPT

## 2024-11-11 PROCEDURE — 82947 ASSAY GLUCOSE BLOOD QUANT: CPT

## 2024-11-11 PROCEDURE — 0352U MULTIPLEX VAGINAL PANEL BY PCR: CPT | Performed by: ADVANCED PRACTICE MIDWIFE

## 2024-11-11 PROCEDURE — 99213 OFFICE O/P EST LOW 20 MIN: CPT | Performed by: ADVANCED PRACTICE MIDWIFE

## 2024-11-11 PROCEDURE — 84443 ASSAY THYROID STIM HORMONE: CPT

## 2024-11-11 PROCEDURE — 36415 COLL VENOUS BLD VENIPUNCTURE: CPT

## 2024-11-11 PROCEDURE — 83036 HEMOGLOBIN GLYCOSYLATED A1C: CPT

## 2024-11-11 RX ORDER — LAMOTRIGINE 100 MG/1
100 TABLET ORAL DAILY
COMMUNITY

## 2024-11-11 NOTE — PATIENT INSTRUCTIONS
Thank you for trusting us with your care!   Please be aware, if you are on Mychart, you may see your results prior to your providers review. If labs are abnormal, we will call or message you on [email or web address] with a follow up plan.    If you need to contact us for questions about:  Symptoms, Scheduling & Medical Questions; Non-urgent (2-3 day response) [employer] message, Urgent (needing response today) [phone] (if after 3:30pm next day response)   Prescriptions: Please call your Pharmacy   Billing: Rickey [phone] or [facility]:[phone]

## 2024-11-11 NOTE — PROGRESS NOTES
Progress Note    SUBJECTIVE:  Mally Evans is an 29 year old, , who requests an Annual Preventive Exam.     Concerns today include:    Vaginal discharge-Patient states that discharge started about one month ago, states that there is odor, but unable to describe it.  Denies fishy smell.  Denies itching or burning.  Wondering if it could be related to starting Lamotrigine.  Started new medication around 10/7/24.  Consents to vaginal multiplex panel.  Denies the need to discuss contraception.  Last sexual activity 1.5 years ago, declined need for STI screening.  Maternal grandmother  of breast cancer in her 30's in , has had surveillance but not connected with breast center, open to referral.  Has had rash on arms since pregnancy, would like dermatology referral.    Follows with mental health provider, feels mood is stable  Due for health maintenance labs, patient desires to proceed  Declines Flu vaccine.    Menstrual History:      2022    11:00 AM 2024     2:20 PM 2024     3:40 PM   Menstrual History   LAST MENSTRUAL PERIOD 10/1/2022 2024 2024       Last PAP:   History of abnormal Pap smear: No - age 21-29 PAP every 3 years recommended      Mammogram current: breast center referral placed    Last Colonoscopy: NA        HISTORY:  Current Outpatient Medications   Medication Sig Dispense Refill    cyclobenzaprine (FLEXERIL) 10 MG tablet Take 1 tablet (10 mg) by mouth 2 times daily as needed for muscle spasms 28 tablet 1    lamoTRIgine (LAMICTAL) 100 MG tablet Take 100 mg by mouth daily.       No current facility-administered medications for this visit.     Allergies   Allergen Reactions    Latex Rash    Adhesive Tape Rash     rash     Immunization History   Administered Date(s) Administered    COVID-19 MONOVALENT 12+ (Pfizer) 2021, 2021    HPV 2016, 08/15/2017, 2018    Pneumococcal 23 valent 2018    TDAP (Adacel,Boostrix) 2016       OB  History    Para Term  AB Living   3 1 1 0 2 1   SAB IAB Ectopic Multiple Live Births   2 0 0 0 1     Past Medical History:   Diagnosis Date    Chlamydia     Depressive disorder     Gonorrhea     Migraines     Varicella     Wounds and injuries      Past Surgical History:   Procedure Laterality Date    ENDOSCOPIC RETROGRADE CHOLANGIOPANCREATOGRAPHY  2017    IR ERCP  3/13/2017    LAPAROSCOPIC CHOLECYSTECTOMY  2017    wisdom teeth removal  2018     Family History   Problem Relation Age of Onset    Anxiety Disorder Mother     Depression Mother     Thyroid Disease Mother     Thyroid Cancer Mother     Obesity Mother     Depression Father     Anxiety Disorder Father     Hypertension Father     Obesity Father     Breast Cancer Maternal Grandmother     Diabetes Paternal Grandfather     Dementia Paternal Grandfather     Polycystic ovary syndrome Sister     Depression Sister     Mental Illness Sister         ADHD    Depression Sister     Mental Illness Sister         ADHD    Depression Sister      Social History     Socioeconomic History    Marital status: Single     Spouse name: None    Number of children: None    Years of education: None    Highest education level: None   Tobacco Use    Smoking status: Former     Current packs/day: 0.00     Average packs/day: 0.2 packs/day for 8.3 years (1.7 ttl pk-yrs)     Types: Cigarettes     Start date: 6/15/2012     Quit date: 10/2020     Years since quittin.1    Smokeless tobacco: Current    Tobacco comments:     vape   Vaping Use    Vaping status: Every Day   Substance and Sexual Activity    Alcohol use: Not Currently     Comment: seldom    Drug use: Not Currently     Types: Marijuana    Sexual activity: Not Currently     Partners: Male     Birth control/protection: None     Social Drivers of Health     Financial Resource Strain: Low Risk  (10/23/2023)    Financial Resource Strain     Within the past 12 months, have you or your family members you live with been  "unable to get utilities (heat, electricity) when it was really needed?: No   Food Insecurity: Low Risk  (10/23/2023)    Food Insecurity     Within the past 12 months, did you worry that your food would run out before you got money to buy more?: No     Within the past 12 months, did the food you bought just not last and you didn t have money to get more?: No   Transportation Needs: Low Risk  (10/23/2023)    Transportation Needs     Within the past 12 months, has lack of transportation kept you from medical appointments, getting your medicines, non-medical meetings or appointments, work, or from getting things that you need?: No   Housing Stability: Low Risk  (10/23/2023)    Housing Stability     Do you have housing? : Yes     Are you worried about losing your housing?: No       Review of Systems     All other systems reviewed and are negative.        7/17/2023     2:45 PM 1/29/2024     1:30 PM 11/4/2024     9:37 AM   PHQ-9 SCORE   PHQ-9 Total Score MyChart 3 (Minimal depression) 16 (Moderately severe depression) 14 (Moderate depression)   PHQ-9 Total Score 3 16 14        Patient-reported         10/23/2023     2:27 PM 1/29/2024     1:30 PM 11/4/2024     9:54 AM   JENNY-7 SCORE   Total Score 12 (moderate anxiety) 14 (moderate anxiety) 13 (moderate anxiety)   Total Score 12 14 13        Patient-reported         EXAM:  Blood pressure 114/77, pulse 69, height 1.53 m (5' 0.25\"), weight 96.3 kg (212 lb 6.4 oz), last menstrual period 11/05/2024, not currently breastfeeding. Body mass index is 41.14 kg/m .  General - pleasant female in no acute distress.  Skin - no suspicious lesions.  Multiple small red areas on arms bilaterally, no discharge or blistering.    EENT-  PERRLA, euthyroid with out palpable nodules  Neck - supple without lymphadenopathy.  Lungs - clear to auscultation bilaterally.  Heart - regular rate and rhythm without murmur.  Abdomen - soft, nontender, nondistended, no masses or organomegaly " noted.  Musculoskeletal - no gross deformities.  Neurological - normal strength, sensation, and mental status.    Breast Exam:  Breast: Without visible skin changes. No dimpling or lesions seen.   Breasts supple, non-tender with palpation, no dominant mass, nodularity, or nipple discharge noted bilaterally. Axillary nodes negative.      Pelvic Exam:  EG/BUS: Normal genital architecture without lesions, erythema or abnormal secretions Bartholin's, Urethra, [place]'s normal   Urethral meatus: normal   Urethra: no masses, tenderness, or scarring   Bladder: no masses or tenderness   Vagina: moist, pink, rugae with creamy, white, and odorless  secretions.  Multiplex sample obtained.  Cervix: closed  Uterus: anteverted,  and exam findings compromised by body habitus  Adnexa: Not palpable secondary to body habitus  Rectum:anus normal       ASSESSMENT:  Encounter Diagnoses   Name Primary?    Vaginitis and vulvovaginitis     Visit for annual health examination Yes    Rash and nonspecific skin eruption     Family history of malignant neoplasm of breast         PLAN:   Orders Placed This Encounter   Procedures    Hemoglobin A1c    Glucose    TSH with free T4 reflex    Lipid Profile    Adult Dermatology  Referral    Breast Provider  Referral    Multiplex Vaginal Panel by PCR     Orders Placed This Encounter   Medications    lamoTRIgine (LAMICTAL) 100 MG tablet     Sig: Take 100 mg by mouth daily.        Will await vaginal multiplex results to determine need for treatment. Patient would prefer oral treatment.  Breast center referral placed for family history of breast cancer.  Dermatology referral placed for long time rash on arms.  Patient will present to lab for routine health maintenance labs.    Return to clinic in one year.  Follow-up as needed.    ROSENDO Arcos CNM

## 2024-11-11 NOTE — LETTER
2024       RE: Mally Evans  [address]  [facility] [address]     Dear Colleague,    Thank you for referring your patient, Mally Evans, to the [facility] [place] at [facility]. Please see a copy of my visit note below.      Progress Note    SUBJECTIVE:  Mally Evans is an 29 year old, , who requests an Annual Preventive Exam.     Concerns today include:    Vaginal discharge-Patient states that discharge started about one month ago, states that there is odor, but unable to describe it.  Denies fishy smell.  Denies itching or burning.  Wondering if it could be related to starting Lamotrigine.  Started new medication around 10/7/24.  Consents to vaginal multiplex panel.  Denies the need to discuss contraception.  Last sexual activity 1.5 years ago, declined need for STI screening.  Maternal grandmother  of breast cancer in her 30's in , has had surveillance but not connected with breast center, open to referral.  Has had rash on arms since pregnancy, would like dermatology referral.    Follows with mental health provider, feels mood is stable  Due for health maintenance labs, patient desires to proceed  Declines Flu vaccine.    Menstrual History:      2022    11:00 AM 2024     2:20 PM 2024     3:40 PM   Menstrual History   LAST MENSTRUAL PERIOD 10/1/2022 2024 2024       Last PAP:   History of abnormal Pap smear: No - age 21-29 PAP every 3 years recommended      Mammogram current: breast center referral placed    Last Colonoscopy: NA        HISTORY:  Current Outpatient Medications   Medication Sig Dispense Refill     cyclobenzaprine (FLEXERIL) 10 MG tablet Take 1 tablet (10 mg) by mouth 2 times daily as needed for muscle spasms 28 tablet 1     lamoTRIgine (LAMICTAL) 100 MG tablet Take 100 mg by mouth daily.       No current facility-administered medications for this visit.      Allergies   Allergen Reactions     Latex Rash     Adhesive Tape Rash     rash     Immunization History   Administered Date(s) Administered     COVID-19 MONOVALENT 12+ (Pfizer) 2021, 2021     HPV 2016, 08/15/2017, 2018     Pneumococcal 23 valent 2018     TDAP (Adacel,Boostrix) 2016       OB History    Para Term  AB Living   3 1 1 0 2 1   SAB IAB Ectopic Multiple Live Births   2 0 0 0 1     Past Medical History:   Diagnosis Date     Chlamydia      Depressive disorder      Gonorrhea      Migraines      Varicella      Wounds and injuries      Past Surgical History:   Procedure Laterality Date     ENDOSCOPIC RETROGRADE CHOLANGIOPANCREATOGRAPHY       IR ERCP  3/13/2017     LAPAROSCOPIC CHOLECYSTECTOMY  2017     wisdom teeth removal  2018     Family History   Problem Relation Age of Onset     Anxiety Disorder Mother      Depression Mother      Thyroid Disease Mother      Thyroid Cancer Mother      Obesity Mother      Depression Father      Anxiety Disorder Father      Hypertension Father      Obesity Father      Breast Cancer Maternal Grandmother      Diabetes Paternal Grandfather      Dementia Paternal Grandfather      Polycystic ovary syndrome Sister      Depression Sister      Mental Illness Sister         ADHD     Depression Sister      Mental Illness Sister         ADHD     Depression Sister      Social History     Socioeconomic History     Marital status: Single     Spouse name: None     Number of children: None     Years of education: None     Highest education level: None   Tobacco Use     Smoking status: Former     Current packs/day: 0.00     Average packs/day: 0.2 packs/day for 8.3 years (1.7 ttl pk-yrs)     Types: Cigarettes     Start date: 6/15/2012     Quit date: 10/2020     Years since quittin.1     Smokeless tobacco: Current     Tobacco comments:     vape   Vaping Use     Vaping status: Every Day   Substance and Sexual Activity     Alcohol  "use: Not Currently     Comment: seldom     Drug use: Not Currently     Types: Marijuana     Sexual activity: Not Currently     Partners: Male     Birth control/protection: None     Social Drivers of Health     Financial Resource Strain: Low Risk  (10/23/2023)    Financial Resource Strain      Within the past 12 months, have you or your family members you live with been unable to get utilities (heat, electricity) when it was really needed?: No   Food Insecurity: Low Risk  (10/23/2023)    Food Insecurity      Within the past 12 months, did you worry that your food would run out before you got money to buy more?: No      Within the past 12 months, did the food you bought just not last and you didn t have money to get more?: No   Transportation Needs: Low Risk  (10/23/2023)    Transportation Needs      Within the past 12 months, has lack of transportation kept you from medical appointments, getting your medicines, non-medical meetings or appointments, work, or from getting things that you need?: No   Housing Stability: Low Risk  (10/23/2023)    Housing Stability      Do you have housing? : Yes      Are you worried about losing your housing?: No       Review of Systems     All other systems reviewed and are negative.        7/17/2023     2:45 PM 1/29/2024     1:30 PM 11/4/2024     9:37 AM   PHQ-9 SCORE   PHQ-9 Total Score MyChart 3 (Minimal depression) 16 (Moderately severe depression) 14 (Moderate depression)   PHQ-9 Total Score 3 16 14        Patient-reported         10/23/2023     2:27 PM 1/29/2024     1:30 PM 11/4/2024     9:54 AM   JENNY-7 SCORE   Total Score 12 (moderate anxiety) 14 (moderate anxiety) 13 (moderate anxiety)   Total Score 12 14 13        Patient-reported         EXAM:  Blood pressure 114/77, pulse 69, height 1.53 m (5' 0.25\"), weight 96.3 kg (212 lb 6.4 oz), last menstrual period 11/05/2024, not currently breastfeeding. Body mass index is 41.14 kg/m .  General - pleasant female in no acute " distress.  Skin - no suspicious lesions.  Multiple small red areas on arms bilaterally, no discharge or blistering.    EENT-  PERRLA, euthyroid with out palpable nodules  Neck - supple without lymphadenopathy.  Lungs - clear to auscultation bilaterally.  Heart - regular rate and rhythm without murmur.  Abdomen - soft, nontender, nondistended, no masses or organomegaly noted.  Musculoskeletal - no gross deformities.  Neurological - normal strength, sensation, and mental status.    Breast Exam:  Breast: Without visible skin changes. No dimpling or lesions seen.   Breasts supple, non-tender with palpation, no dominant mass, nodularity, or nipple discharge noted bilaterally. Axillary nodes negative.      Pelvic Exam:  EG/BUS: Normal genital architecture without lesions, erythema or abnormal secretions Bartholin's, Urethra, Saco's normal   Urethral meatus: normal   Urethra: no masses, tenderness, or scarring   Bladder: no masses or tenderness   Vagina: moist, pink, rugae with creamy, white, and odorless  secretions.  Multiplex sample obtained.  Cervix: closed  Uterus: anteverted,  and exam findings compromised by body habitus  Adnexa: Not palpable secondary to body habitus  Rectum:anus normal       ASSESSMENT:  Encounter Diagnoses   Name Primary?     Vaginitis and vulvovaginitis      Visit for annual health examination Yes     Rash and nonspecific skin eruption      Family history of malignant neoplasm of breast         PLAN:   Orders Placed This Encounter   Procedures     Hemoglobin A1c     Glucose     TSH with free T4 reflex     Lipid Profile     Adult Dermatology  Referral     Breast Provider  Referral     Multiplex Vaginal Panel by PCR     Orders Placed This Encounter   Medications     lamoTRIgine (LAMICTAL) 100 MG tablet     Sig: Take 100 mg by mouth daily.        Will await vaginal multiplex results to determine need for treatment. Patient would prefer oral treatment.  Breast center referral  placed for family history of breast cancer.  Dermatology referral placed for long time rash on arms.  Patient will present to lab for routine health maintenance labs.    Return to clinic in one year.  Follow-up as needed.    ROSENDO Arcos CNM          Again, thank you for allowing me to participate in the care of your patient.      Sincerely,    ROSENDO Arcos CNM

## 2024-11-12 ENCOUNTER — PATIENT OUTREACH (OUTPATIENT)
Dept: ONCOLOGY | Facility: CLINIC | Age: 29
End: 2024-11-12
Payer: COMMERCIAL

## 2024-11-12 NOTE — PROGRESS NOTES
New Patient Oncology Nurse Navigator Note     Referring provider:     Mallorie Slade APRN CNM        Referring Clinic/Organization: [facility]      Referred to (specialty:) Breast Provider Referral     Requested provider (if applicable): NA     Date Referral Received: 2024     Evaluation for:  Z80.3 (ICD-10-CM) - Family history of malignant neoplasm of breast     Maternal grandmother  of breast cancer in her 30's in 1968     Records Location: See Bookmarked material    Payor: BCBS / Plan: [place] / Product Type: Indemnity /     2024  Referral received and reviewed.  Sent to NPS to schedule.     Maxine DIAZN, RN, OCN  Oncology Nurse Navigator   [facility]  Cancer Care Service Line   New Patient Hem/Onc Scheduling / Referrals: [phone] (fax: [phone] )

## 2025-01-08 NOTE — TELEPHONE ENCOUNTER
RECORDS STATUS - ALL OTHER DIAGNOSIS      RECORDS RECEIVED FROM: [facility]   NOTES STATUS DETAILS   OFFICE NOTE from referring provider Epic 11/11/24: ROSENDO Leon CNM   MEDICATION LIST [facility]    LABS     ANYTHING RELATED TO DIAGNOSIS Epic Most recent 11/11/24

## 2025-02-04 NOTE — PROGRESS NOTES
"NEW CONSULTATION   2025     Mally Evans is a 29 year old woman who presents with family history of breast cancer. She was referred by Dr. Slade.    HPI:    Family history of maternal grandmother with breast cancer diagnosed in her 30's.     Today she denies any breast mass, skin change, nipple inversion or nipple discharge. She does feel \"breast lumps\" on self exam.     BREAST-SPECIFIC HISTORY:    Previous breast imaging: No    Prior breast biopsies/surgeries: No    Prior history of breast cancer or DCIS: No  Prior radiation history: No   Breast density:     GYN HISTORY:  . Age at 1st pregnancy: 27. Breastfeeding history: Yes.   Age at menarche: 13  Menopausal: premenopausal .   Menopausal hormone replacement therapy: No     RISK ASSESSMENT: < 5% 10 year risk by AWA, and < 20% lifetime risk by AWA  Viji: n/a, age  AWA/Anshul: 18.3% lifetime risk, 0.5% 10 year risk     FAMILY HISTORY:  Breast ca: Yes  - maternal grandmother, diagnosed in her 30's, passed of breast cancer  Ovarian ca: No  Pancreatic ca: No  Prostate: No  Gastric ca: No  Melanoma: No  Colon ca: No  Other cancer: Yes  - great aunt and her daughter with unknown cancer  - mother with thyroid cancer  Other genetic, testing, syndromes, or clotting disorders:      PAST MEDICAL HISTORY  Past Medical History:   Diagnosis Date    Chlamydia     Depressive disorder     Gonorrhea     Migraines     Need for Tdap vaccination 2022    Varicella     Wounds and injuries      PAST SURGICAL HISTORY   Past Surgical History:   Procedure Laterality Date    ENDOSCOPIC RETROGRADE CHOLANGIOPANCREATOGRAPHY      IR ERCP  3/13/2017    LAPAROSCOPIC CHOLECYSTECTOMY  2017    wisdom teeth removal  2018     MEDICATIONS  Current Outpatient Medications   Medication Sig Dispense Refill    cyclobenzaprine (FLEXERIL) 10 MG tablet Take 1 tablet (10 mg) by mouth 2 times daily as needed for muscle spasms 28 tablet 1    lamoTRIgine (LAMICTAL) 100 MG tablet " "Take 100 mg by mouth daily.       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     ROS:   Change in vision No  Headaches: no  Respiratory: No shortness of breath, dyspnea on exertion, cough, or hemoptysis   Cardiovascular: negative   Gastrointestinal: negative Abdominal pain: no  Breast: negative  Musculoskeletal: negative Joint pain No Back pain: no  Psychiatric: negative  Hematologic/Lymphatic/Immunologic: negative  Endocrine: negative    EXAM  /78 (BP Location: Right arm, Patient Position: Sitting, Cuff Size: Adult Large)   Pulse 82   Temp 97.6  F (36.4  C) (Oral)   Resp 20   Ht 1.533 m (5' 0.35\")   Wt 95.7 kg (210 lb 15.7 oz)   LMP 01/31/2025   SpO2 98%   BMI 40.72 kg/m     PHYSICAL EXAM  Respiratory: breathing non labored.   Breasts: Examination was done in both the upright and supine positions.  Breasts are symmetrical . No skin or nipple changes. No nipple discharge. Right breast 1:00 roughly 10 cm from the nipple there is a 7 mm area of firmness. Patient palpated right breast lump 9:00.  No clavicular, cervical, or axillary lymphadenopathy.     INVESTIGATIONS:      ASSESSMENT/PLAN:    Mally Evans is a 29 year old woman with family history of breast cancer. She has a right breast 1:00 area of firm palpable tissue and right breast 9:00 self palpated lump. Will obtain right breast ultrasound.     1) Right breast 1:00 firm area of tissue on clinic breast exam and right breast 9:00 patient palpated lump  - Right breast ultrasound ordered and will be scheduled. No ultrasound availability today.     2) Family history of breast cancer  Screening recommendations based on NCCN guidelines. Clinical encounter every 6-12 months. Annual mammogram with ambrosio starting 10 years prior to the youngest family member but not " less than age 30 or age 40 ( whichever comes first).   - Be familiar with your breast and promptly report any changes.   - Next clinic visit due 2/2026  - Begin annual screening mammogram at age 30.   - Referral to a genetic counselor      3) Lifestyle Modifications were provided.   - Maintain your best healthy weight. Higher body fat and adult weight gain is associated with increased risk for breast cancer. This increase in risk has been attributed to increase in circulating endogenous estrogen levels from fat tissue.   - Any alcohol intake increases the risk for breast cancer. If you choose to drink alcohol limit alcohol consumption to less than 1 drink (1 ounce of liquor, 6 ounces of wine, or 8 ounces of beer) per day or less than 3 drinks per week.  - Be active daily and void being sedentary.   - Vitamin D may decrease the risk of developing breast cancer.     Priya Vargas PA-C    30 minutes spent on the date of the encounter doing chart review, review of test results, interpretation of tests, patient visit and documentation.

## 2025-02-06 ENCOUNTER — PATIENT OUTREACH (OUTPATIENT)
Dept: ONCOLOGY | Facility: CLINIC | Age: 30
End: 2025-02-06

## 2025-02-06 ENCOUNTER — PRE VISIT (OUTPATIENT)
Dept: ONCOLOGY | Facility: CLINIC | Age: 30
End: 2025-02-06

## 2025-02-06 ENCOUNTER — ONCOLOGY VISIT (OUTPATIENT)
Dept: SURGERY | Facility: CLINIC | Age: 30
End: 2025-02-06
Attending: ADVANCED PRACTICE MIDWIFE
Payer: COMMERCIAL

## 2025-02-06 VITALS
RESPIRATION RATE: 20 BRPM | WEIGHT: 210.98 LBS | DIASTOLIC BLOOD PRESSURE: 78 MMHG | TEMPERATURE: 97.6 F | SYSTOLIC BLOOD PRESSURE: 110 MMHG | BODY MASS INDEX: 41.42 KG/M2 | OXYGEN SATURATION: 98 % | HEART RATE: 82 BPM | HEIGHT: 60 IN

## 2025-02-06 DIAGNOSIS — Z80.3 FAMILY HISTORY OF MALIGNANT NEOPLASM OF BREAST: ICD-10-CM

## 2025-02-06 DIAGNOSIS — N63.10 MASS OF RIGHT BREAST, UNSPECIFIED QUADRANT: Primary | ICD-10-CM

## 2025-02-06 PROCEDURE — 99213 OFFICE O/P EST LOW 20 MIN: CPT | Performed by: PHYSICIAN ASSISTANT

## 2025-02-06 ASSESSMENT — PAIN SCALES - GENERAL: PAINLEVEL_OUTOF10: NO PAIN (0)

## 2025-02-06 NOTE — LETTER
"2025      Mally Evans  [address]  [facility] [address]      Dear Colleague,    Thank you for referring your patient, Mally Evans, to the [facility]. Please see a copy of my visit note below.    NEW CONSULTATION   2025     Mally Evans is a 29 year old woman who presents with family history of breast cancer. She was referred by Dr. Slade.    HPI:    Family history of maternal grandmother with breast cancer diagnosed in her 30's.     Today she denies any breast mass, skin change, nipple inversion or nipple discharge. She does feel \"breast lumps\" on self exam.     BREAST-SPECIFIC HISTORY:    Previous breast imaging: No    Prior breast biopsies/surgeries: No    Prior history of breast cancer or DCIS: No  Prior radiation history: No   Breast density:     GYN HISTORY:  . Age at 1st pregnancy: 27. Breastfeeding history: Yes.   Age at menarche: 13  Menopausal: premenopausal .   Menopausal hormone replacement therapy: No     RISK ASSESSMENT: < 5% 10 year risk by AWA, and < 20% lifetime risk by AWA  Viji: n/a, age  AWA/Anshul: 18.3% lifetime risk, 0.5% 10 year risk     FAMILY HISTORY:  Breast ca: Yes  - maternal grandmother, diagnosed in her 30's, passed of breast cancer  Ovarian ca: No  Pancreatic ca: No  Prostate: No  Gastric ca: No  Melanoma: No  Colon ca: No  Other cancer: Yes  - great aunt and her daughter with unknown cancer  - mother with thyroid cancer  Other genetic, testing, syndromes, or clotting disorders:      PAST MEDICAL HISTORY  Past Medical History:   Diagnosis Date     Chlamydia      Depressive disorder      Gonorrhea      Migraines      Need for Tdap vaccination 2022     Varicella      Wounds and injuries      PAST SURGICAL HISTORY   Past Surgical History:   Procedure Laterality Date     ENDOSCOPIC RETROGRADE CHOLANGIOPANCREATOGRAPHY  2017     IR ERCP  3/13/2017     LAPAROSCOPIC CHOLECYSTECTOMY  2017     wisdom teeth " "removal  03/2018     MEDICATIONS  Current Outpatient Medications   Medication Sig Dispense Refill     cyclobenzaprine (FLEXERIL) 10 MG tablet Take 1 tablet (10 mg) by mouth 2 times daily as needed for muscle spasms 28 tablet 1     lamoTRIgine (LAMICTAL) 100 MG tablet Take 100 mg by mouth daily.       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     ROS:   Change in vision No  Headaches: no  Respiratory: No shortness of breath, dyspnea on exertion, cough, or hemoptysis   Cardiovascular: negative   Gastrointestinal: negative Abdominal pain: no  Breast: negative  Musculoskeletal: negative Joint pain No Back pain: no  Psychiatric: negative  Hematologic/Lymphatic/Immunologic: negative  Endocrine: negative    EXAM  /78 (BP Location: Right arm, Patient Position: Sitting, Cuff Size: Adult Large)   Pulse 82   Temp 97.6  F (36.4  C) (Oral)   Resp 20   Ht 1.533 m (5' 0.35\")   Wt 95.7 kg (210 lb 15.7 oz)   LMP 01/31/2025   SpO2 98%   BMI 40.72 kg/m     PHYSICAL EXAM  Respiratory: breathing non labored.   Breasts: Examination was done in both the upright and supine positions.  Breasts are symmetrical . No skin or nipple changes. No nipple discharge. Right breast 1:00 roughly 10 cm from the nipple there is a 7 mm area of firmness. Patient palpated right breast lump 9:00.  No clavicular, cervical, or axillary lymphadenopathy.     INVESTIGATIONS:      ASSESSMENT/PLAN:    Mally Evans is a 29 year old woman with family history of breast cancer. She has a right breast 1:00 area of firm palpable tissue and right breast 9:00 self palpated lump. Will obtain right breast ultrasound.     1) Right breast 1:00 firm area of tissue on clinic breast exam and right breast 9:00 patient palpated lump  - Right breast ultrasound ordered and will " be scheduled. No ultrasound availability today.     2) Family history of breast cancer  Screening recommendations based on NCCN guidelines. Clinical encounter every 6-12 months. Annual mammogram with ambrosio starting 10 years prior to the youngest family member but not less than age 30 or age 40 ( whichever comes first).   - Be familiar with your breast and promptly report any changes.   - Next clinic visit due 2/2026  - Begin annual screening mammogram at age 30.   - Referral to a genetic counselor      3) Lifestyle Modifications were provided.   - Maintain your best healthy weight. Higher body fat and adult weight gain is associated with increased risk for breast cancer. This increase in risk has been attributed to increase in circulating endogenous estrogen levels from fat tissue.   - Any alcohol intake increases the risk for breast cancer. If you choose to drink alcohol limit alcohol consumption to less than 1 drink (1 ounce of liquor, 6 ounces of wine, or 8 ounces of beer) per day or less than 3 drinks per week.  - Be active daily and void being sedentary.   - Vitamin D may decrease the risk of developing breast cancer.     Priya Vargas PA-C    30 minutes spent on the date of the encounter doing chart review, review of test results, interpretation of tests, patient visit and documentation.        Again, thank you for allowing me to participate in the care of your patient.        Sincerely,        Priya Vargas PA-C    Electronically signed

## 2025-02-06 NOTE — NURSING NOTE
"Oncology Rooming Note    February 6, 2025 8:13 AM   Mally Evans is a 29 year old female who presents for:    Chief Complaint   Patient presents with    Oncology Clinic Visit     Family history of malignant neoplasm of breast     Initial Vitals: /78 (BP Location: Right arm, Patient Position: Sitting, Cuff Size: Adult Large)   Pulse 82   Temp 97.6  F (36.4  C) (Oral)   Resp 20   Ht 1.533 m (5' 0.35\")   Wt 95.7 kg (210 lb 15.7 oz)   LMP 01/31/2025   SpO2 98%   BMI 40.72 kg/m   Estimated body mass index is 40.72 kg/m  as calculated from the following:    Height as of this encounter: 1.533 m (5' 0.35\").    Weight as of this encounter: 95.7 kg (210 lb 15.7 oz). Body surface area is 2.02 meters squared.  No Pain (0) Comment: Data Unavailable   Patient's last menstrual period was 01/31/2025.  Allergies reviewed: Yes  Medications reviewed: Yes    Medications: Medication refills not needed today.  Pharmacy name entered into G[employer]dpod:    [id number] - [place], [place] - [address] AT [facility] [address]  [facility] [address] [place] [address]    Frailty Screening:   Is the patient here for a new oncology consult visit in cancer care? 1. Yes. Over the past month, have you experienced difficulty or required a caregiver to assist with:   1. Balance, walking or general mobility (including any falls)? NO  2. Completion of self-care tasks such as bathing, dressing, toileting, grooming/hygiene?  NO  3. Concentration or memory that affects your daily life?  YES       Clinical concerns: none      Radha Jeronimo"

## 2025-02-06 NOTE — PROGRESS NOTES
New Patient Oncology Nurse Navigator Note     Referring provider:     Priya Vargas PA-C        Referring Clinic/Organization:     [facility]        Referred to (specialty:) Genetic Counseling and Cancer Risk Management     Requested provider (if applicable): NA     Date Referral Received: February 6, 2025     Evaluation for:  Z80.3 (ICD-10-CM) - Family history of malignant neoplasm of breast      Family history of breast cancer  Screening recommendations based on NCCN guidelines. Clinical encounter every 6-12 months. Annual mammogram with ambrosio starting 10 years prior to the youngest family member but not less than age 30 or age 40 ( whichever comes first).   - Be familiar with your breast and promptly report any changes.   - Next clinic visit due 2/2026  - Begin annual screening mammogram at age 30.   - Referral to a genetic counselor     Payor: BCBS / Plan: [place] / Product Type: Indemnity /     February 6, 2025  Referral received and reviewed.   Sent to NPS to schedule.     Maxine DIAZN, RN, OCN  Oncology Nurse Navigator   [facility]  Cancer Care Service Line   New Patient Hem/Onc Scheduling / Referrals: [phone] (fax: [phone] )

## 2025-02-14 ENCOUNTER — ANCILLARY PROCEDURE (OUTPATIENT)
Dept: MAMMOGRAPHY | Facility: CLINIC | Age: 30
End: 2025-02-14
Attending: PHYSICIAN ASSISTANT

## 2025-02-14 DIAGNOSIS — N63.10 MASS OF RIGHT BREAST, UNSPECIFIED QUADRANT: ICD-10-CM

## 2025-02-14 PROCEDURE — 76642 ULTRASOUND BREAST LIMITED: CPT | Mod: RT | Performed by: RADIOLOGY
